# Patient Record
Sex: FEMALE | Race: WHITE | NOT HISPANIC OR LATINO | URBAN - METROPOLITAN AREA
[De-identification: names, ages, dates, MRNs, and addresses within clinical notes are randomized per-mention and may not be internally consistent; named-entity substitution may affect disease eponyms.]

---

## 2017-08-18 PROBLEM — Z00.00 ENCOUNTER FOR PREVENTIVE HEALTH EXAMINATION: Status: ACTIVE | Noted: 2017-08-18

## 2018-09-26 ENCOUNTER — OUTPATIENT (OUTPATIENT)
Dept: OUTPATIENT SERVICES | Facility: HOSPITAL | Age: 63
LOS: 1 days | End: 2018-09-26

## 2018-09-27 ENCOUNTER — EMERGENCY (EMERGENCY)
Facility: HOSPITAL | Age: 63
LOS: 1 days | Discharge: HOME | End: 2018-09-27
Attending: EMERGENCY MEDICINE
Payer: COMMERCIAL

## 2018-09-27 VITALS
OXYGEN SATURATION: 98 % | WEIGHT: 98.11 LBS | DIASTOLIC BLOOD PRESSURE: 100 MMHG | RESPIRATION RATE: 16 BRPM | SYSTOLIC BLOOD PRESSURE: 182 MMHG | TEMPERATURE: 98 F | HEART RATE: 72 BPM | HEIGHT: 59 IN

## 2018-09-27 VITALS
RESPIRATION RATE: 15 BRPM | OXYGEN SATURATION: 98 % | DIASTOLIC BLOOD PRESSURE: 80 MMHG | HEART RATE: 94 BPM | SYSTOLIC BLOOD PRESSURE: 136 MMHG | TEMPERATURE: 98 F

## 2018-09-27 DIAGNOSIS — F06.8 OTHER SPECIFIED MENTAL DISORDERS DUE TO KNOWN PHYSIOLOGICAL CONDITION: ICD-10-CM

## 2018-09-27 DIAGNOSIS — Z98.891 HISTORY OF UTERINE SCAR FROM PREVIOUS SURGERY: Chronic | ICD-10-CM

## 2018-09-27 LAB
ANION GAP SERPL CALC-SCNC: 8 MMOL/L — SIGNIFICANT CHANGE UP (ref 5–17)
APTT BLD: 32.8 SEC — SIGNIFICANT CHANGE UP (ref 27.5–37.4)
BASOPHILS # BLD AUTO: 0.04 K/UL — SIGNIFICANT CHANGE UP (ref 0–0.2)
BASOPHILS NFR BLD AUTO: 0.8 % — SIGNIFICANT CHANGE UP (ref 0–2)
BUN SERPL-MCNC: 19 MG/DL — SIGNIFICANT CHANGE UP (ref 7–23)
CALCIUM SERPL-MCNC: 9.1 MG/DL — SIGNIFICANT CHANGE UP (ref 8.5–10.1)
CHLORIDE SERPL-SCNC: 105 MMOL/L — SIGNIFICANT CHANGE UP (ref 96–108)
CK MB CFR SERPL CALC: 7.3 NG/ML — HIGH (ref 0–3.6)
CO2 SERPL-SCNC: 30 MMOL/L — SIGNIFICANT CHANGE UP (ref 22–31)
CREAT SERPL-MCNC: 0.67 MG/DL — SIGNIFICANT CHANGE UP (ref 0.5–1.3)
EOSINOPHIL # BLD AUTO: 0.15 K/UL — SIGNIFICANT CHANGE UP (ref 0–0.5)
EOSINOPHIL NFR BLD AUTO: 2.9 % — SIGNIFICANT CHANGE UP (ref 0–6)
GLUCOSE SERPL-MCNC: 83 MG/DL — SIGNIFICANT CHANGE UP (ref 70–99)
HCT VFR BLD CALC: 37.9 % — SIGNIFICANT CHANGE UP (ref 34.5–45)
HGB BLD-MCNC: 13.1 G/DL — SIGNIFICANT CHANGE UP (ref 11.5–15.5)
IMM GRANULOCYTES NFR BLD AUTO: 0.2 % — SIGNIFICANT CHANGE UP (ref 0–1.5)
INR BLD: 1.06 RATIO — SIGNIFICANT CHANGE UP (ref 0.88–1.16)
LACTATE SERPL-SCNC: 1.2 MMOL/L — SIGNIFICANT CHANGE UP (ref 0.7–2)
LIDOCAIN IGE QN: 124 U/L — SIGNIFICANT CHANGE UP (ref 73–393)
LYMPHOCYTES # BLD AUTO: 0.59 K/UL — LOW (ref 1–3.3)
LYMPHOCYTES # BLD AUTO: 11.5 % — LOW (ref 13–44)
MCHC RBC-ENTMCNC: 31 PG — SIGNIFICANT CHANGE UP (ref 27–34)
MCHC RBC-ENTMCNC: 34.6 GM/DL — SIGNIFICANT CHANGE UP (ref 32–36)
MCV RBC AUTO: 89.8 FL — SIGNIFICANT CHANGE UP (ref 80–100)
MONOCYTES # BLD AUTO: 0.34 K/UL — SIGNIFICANT CHANGE UP (ref 0–0.9)
MONOCYTES NFR BLD AUTO: 6.7 % — SIGNIFICANT CHANGE UP (ref 2–14)
NEUTROPHILS # BLD AUTO: 3.98 K/UL — SIGNIFICANT CHANGE UP (ref 1.8–7.4)
NEUTROPHILS NFR BLD AUTO: 77.9 % — HIGH (ref 43–77)
PLATELET # BLD AUTO: 376 K/UL — SIGNIFICANT CHANGE UP (ref 150–400)
POTASSIUM SERPL-MCNC: 4.2 MMOL/L — SIGNIFICANT CHANGE UP (ref 3.5–5.3)
POTASSIUM SERPL-SCNC: 4.2 MMOL/L — SIGNIFICANT CHANGE UP (ref 3.5–5.3)
PROTHROM AB SERPL-ACNC: 11.6 SEC — SIGNIFICANT CHANGE UP (ref 9.8–12.7)
RBC # BLD: 4.22 M/UL — SIGNIFICANT CHANGE UP (ref 3.8–5.2)
RBC # FLD: 13.7 % — SIGNIFICANT CHANGE UP (ref 10.3–14.5)
SODIUM SERPL-SCNC: 143 MMOL/L — SIGNIFICANT CHANGE UP (ref 135–145)
TROPONIN I SERPL-MCNC: <.015 NG/ML — SIGNIFICANT CHANGE UP (ref 0.01–0.04)
WBC # BLD: 5.11 K/UL — SIGNIFICANT CHANGE UP (ref 3.8–10.5)
WBC # FLD AUTO: 5.11 K/UL — SIGNIFICANT CHANGE UP (ref 3.8–10.5)

## 2018-09-27 PROCEDURE — 70450 CT HEAD/BRAIN W/O DYE: CPT

## 2018-09-27 PROCEDURE — 82553 CREATINE MB FRACTION: CPT

## 2018-09-27 PROCEDURE — 72125 CT NECK SPINE W/O DYE: CPT

## 2018-09-27 PROCEDURE — 70544 MR ANGIOGRAPHY HEAD W/O DYE: CPT

## 2018-09-27 PROCEDURE — 93970 EXTREMITY STUDY: CPT | Mod: 26

## 2018-09-27 PROCEDURE — 99284 EMERGENCY DEPT VISIT MOD MDM: CPT

## 2018-09-27 PROCEDURE — 71045 X-RAY EXAM CHEST 1 VIEW: CPT

## 2018-09-27 PROCEDURE — 70544 MR ANGIOGRAPHY HEAD W/O DYE: CPT | Mod: 26,59

## 2018-09-27 PROCEDURE — 83605 ASSAY OF LACTIC ACID: CPT

## 2018-09-27 PROCEDURE — 85610 PROTHROMBIN TIME: CPT

## 2018-09-27 PROCEDURE — 70551 MRI BRAIN STEM W/O DYE: CPT | Mod: 26

## 2018-09-27 PROCEDURE — 71045 X-RAY EXAM CHEST 1 VIEW: CPT | Mod: 26

## 2018-09-27 PROCEDURE — 36415 COLL VENOUS BLD VENIPUNCTURE: CPT

## 2018-09-27 PROCEDURE — 93005 ELECTROCARDIOGRAM TRACING: CPT

## 2018-09-27 PROCEDURE — 93970 EXTREMITY STUDY: CPT

## 2018-09-27 PROCEDURE — 84484 ASSAY OF TROPONIN QUANT: CPT

## 2018-09-27 PROCEDURE — 70450 CT HEAD/BRAIN W/O DYE: CPT | Mod: 26

## 2018-09-27 PROCEDURE — 85027 COMPLETE CBC AUTOMATED: CPT

## 2018-09-27 PROCEDURE — 70551 MRI BRAIN STEM W/O DYE: CPT

## 2018-09-27 PROCEDURE — 85730 THROMBOPLASTIN TIME PARTIAL: CPT

## 2018-09-27 PROCEDURE — 99284 EMERGENCY DEPT VISIT MOD MDM: CPT | Mod: 25

## 2018-09-27 PROCEDURE — 80048 BASIC METABOLIC PNL TOTAL CA: CPT

## 2018-09-27 PROCEDURE — 72125 CT NECK SPINE W/O DYE: CPT | Mod: 26

## 2018-09-27 PROCEDURE — 83690 ASSAY OF LIPASE: CPT

## 2018-09-27 RX ORDER — SODIUM CHLORIDE 9 MG/ML
1000 INJECTION INTRAMUSCULAR; INTRAVENOUS; SUBCUTANEOUS ONCE
Qty: 0 | Refills: 0 | Status: COMPLETED | OUTPATIENT
Start: 2018-09-27 | End: 2018-09-27

## 2018-09-27 RX ORDER — DIAZEPAM 5 MG
5 TABLET ORAL ONCE
Qty: 0 | Refills: 0 | Status: DISCONTINUED | OUTPATIENT
Start: 2018-09-27 | End: 2018-09-27

## 2018-09-27 RX ADMIN — SODIUM CHLORIDE 1000 MILLILITER(S): 9 INJECTION INTRAMUSCULAR; INTRAVENOUS; SUBCUTANEOUS at 14:54

## 2018-09-27 RX ADMIN — Medication 5 MILLIGRAM(S): at 14:54

## 2018-09-27 NOTE — ED PROVIDER NOTE - PHYSICAL EXAMINATION
GEN: awake, alert, well appearing, NAD   HENT: atraumatic, normocephalic, LASHONDA, EOMI, no midline instability, oropharynx w/o erythema or exudates, no lymphadenopathy  CV: normal rate and rhythm, S1, S2, no MRG, equal pulses throughout, no JVD, LLE swollen   RESP: no distress, no IWOB, no retraction, clear to auscultation bilaterally   ABD: soft, nontender, nondistended, no rebound, no guarding, normoactive bowel sounds, no organomegally  MUSCULOSKELETAL: strenght 5/5 x 4, full range of motion, CMS intact   SKIN: normal color, no turgor, no wounds or rash   NEURO: Awake alert oriented x 3, no facial asymmetry, no slurred speech, no pronator drift, moving all extremities  PSYCH: no suicial ideation, no homicidal ideation, no depression, no anxiety, no hallucination

## 2018-09-27 NOTE — ED ADULT NURSE REASSESSMENT NOTE - NSIMPLEMENTINTERV_GEN_ALL_ED
Implemented All Fall Risk Interventions:  [place] to call system. Call bell, personal items and telephone within reach. Instruct patient to call for assistance. Room bathroom lighting operational. Non-slip footwear when patient is off stretcher. Physically safe environment: no spills, clutter or unnecessary equipment. Stretcher in lowest position, wheels locked, appropriate side rails in place. Provide visual cue, wrist band, yellow gown, etc. Monitor gait and stability. Monitor for mental status changes and reorient to person, place, and time. Review medications for side effects contributing to fall risk. Reinforce activity limits and safety measures with patient and family.

## 2018-09-27 NOTE — ED ADULT NURSE REASSESSMENT NOTE - NS ED NURSE REASSESS COMMENT FT1
MRI/MRA results in. As per neurology OK for discharge home and follow up outpatient. Patient and family OK with plan.
baclofen pump checked by Alireza from [employer] s/p MRI... Pump is on
neuro @ bedside
Received patient from [place] RN. Patient alert and oriented. Vital signs as documented. Denies pain at this time. Awaiting neuro consult, MRI/A results. Awaiting plan of care. Safety maintained.

## 2018-09-27 NOTE — ED ADULT NURSE NOTE - CHPI ED NUR SYMPTOMS NEG
no dizziness/no change in level of consciousness/no confusion/no loss of consciousness/no fever/no nausea/no blurred vision/no vomiting

## 2018-09-27 NOTE — ED ADULT NURSE NOTE - NSIMPLEMENTINTERV_GEN_ALL_ED
Implemented All Fall Risk Interventions:  [place] to call system. Call bell, personal items and telephone within reach. Instruct patient to call for assistance. Room bathroom lighting operational. Non-slip footwear when patient is off stretcher. Physically safe environment: no spills, clutter or unnecessary equipment. Stretcher in lowest position, wheels locked, appropriate side rails in place. Provide visual cue, wrist band, yellow gown, etc. Monitor gait and stability. Monitor for mental status changes and reorient to person, place, and time. Review medications for side effects contributing to fall risk. Reinforce activity limits and safety measures with patient and family.

## 2018-09-27 NOTE — ED ADULT NURSE NOTE - OBJECTIVE STATEMENT
received pt in bed #14a Pt A&O c/o slurred speech since around 1800 last night Dr Artis @ bedside pt also c/o neck pain No slurred speech noted but pt appears to be stuttering which is new according to pt's . Right hand always numb due to MS received pt in bed #14a Pt A&O c/o slurred speech since around 1800 last night Dr Artis @ bedside pt also c/o neck pain No slurred speech noted but pt appears to be stuttering which is new according to pt's . Right hand always numb due to MS Right leg "always red & swollen Left leg "not usually swollen"

## 2018-09-27 NOTE — ED ADULT NURSE NOTE - NS PRO PASSIVE SMOKE EXP
Spoke to pt - informed of results. Advised to return to ED asap for treatment. Pt states that she will return to the ED tonight.
No

## 2018-09-27 NOTE — ED PROVIDER NOTE - OBJECTIVE STATEMENT
63yo F h/o MS p/w expressive aphasia since last night, also c/o R posterior superior neck/ha w/ bilateral UE numbness and LE weakness. denies f/c/n/v/d/cp/sob/abd pain.

## 2018-09-27 NOTE — ED PROVIDER NOTE - MEDICAL DECISION MAKING DETAILS
61yo F h/o MS p/w expressive aphasia since last night, also c/o R posterior superior neck/ha w/ bilateral UE numbness and LE weakness. denies f/c/n/v/d/cp/sob/abd pain.

## 2018-09-27 NOTE — ED PROVIDER NOTE - NS ED ROS FT
GEN: no fever, no chills, no weakness  HENT: no eye pain, no visual changes, no ear pain, no visual or hearing changes, no sore throat, no swelling +neck pain  CV: no chest pain, no palpitations, no dizziness, no swelling  RESP: no coughing, no sob, no IWOB, no TRUJILLO  GI: no abd pain, no distension, no nausea, no vomiting, no diarrhea, no constipation  : no dysuria,  no frequency, no hematuria, no discharge, no flank pain  MUSCULOSKELETAL: no myalgia, no arthralgia, no joint swelling, no bruising   SKIN: no rash, no wounds, no itching  NEURO: no change in mentation, no visual changes, +HA, no focal weakness, +trouble speaking, no gait abnormalities, no dizziness, +B/L UE numbness   PSYCH: no suidical ideation, no homicidal ideation, no depression, no anxiety, no hallucinations

## 2018-11-05 ENCOUNTER — OUTPATIENT (OUTPATIENT)
Dept: OUTPATIENT SERVICES | Facility: HOSPITAL | Age: 63
LOS: 1 days | End: 2018-11-05

## 2018-11-05 DIAGNOSIS — Z98.891 HISTORY OF UTERINE SCAR FROM PREVIOUS SURGERY: Chronic | ICD-10-CM

## 2018-11-07 DIAGNOSIS — R41.841 COGNITIVE COMMUNICATION DEFICIT: ICD-10-CM

## 2018-11-07 DIAGNOSIS — G35 MULTIPLE SCLEROSIS: ICD-10-CM

## 2018-11-07 DIAGNOSIS — R47.01 APHASIA: ICD-10-CM

## 2018-12-03 DIAGNOSIS — F43.21 ADJUSTMENT DISORDER WITH DEPRESSED MOOD: ICD-10-CM

## 2019-09-06 ENCOUNTER — INPATIENT (INPATIENT)
Facility: HOSPITAL | Age: 64
LOS: 5 days | Discharge: NURSING, REHAB OR LONG-TERM CARE | End: 2019-09-12
Attending: ORTHOPAEDIC SURGERY | Admitting: ORTHOPAEDIC SURGERY
Payer: COMMERCIAL

## 2019-09-06 ENCOUNTER — TRANSCRIPTION ENCOUNTER (OUTPATIENT)
Age: 64
End: 2019-09-06

## 2019-09-06 VITALS
OXYGEN SATURATION: 98 % | HEART RATE: 113 BPM | TEMPERATURE: 99 F | SYSTOLIC BLOOD PRESSURE: 131 MMHG | DIASTOLIC BLOOD PRESSURE: 80 MMHG | RESPIRATION RATE: 16 BRPM

## 2019-09-06 DIAGNOSIS — Z98.891 HISTORY OF UTERINE SCAR FROM PREVIOUS SURGERY: Chronic | ICD-10-CM

## 2019-09-06 DIAGNOSIS — D64.9 ANEMIA, UNSPECIFIED: ICD-10-CM

## 2019-09-06 DIAGNOSIS — Z01.818 ENCOUNTER FOR OTHER PREPROCEDURAL EXAMINATION: ICD-10-CM

## 2019-09-06 DIAGNOSIS — G35 MULTIPLE SCLEROSIS: ICD-10-CM

## 2019-09-06 DIAGNOSIS — R35.0 FREQUENCY OF MICTURITION: ICD-10-CM

## 2019-09-06 DIAGNOSIS — S72.141A DISPLACED INTERTROCHANTERIC FRACTURE OF RIGHT FEMUR, INITIAL ENCOUNTER FOR CLOSED FRACTURE: ICD-10-CM

## 2019-09-06 DIAGNOSIS — R00.0 TACHYCARDIA, UNSPECIFIED: ICD-10-CM

## 2019-09-06 DIAGNOSIS — S72.009A FRACTURE OF UNSPECIFIED PART OF NECK OF UNSPECIFIED FEMUR, INITIAL ENCOUNTER FOR CLOSED FRACTURE: ICD-10-CM

## 2019-09-06 DIAGNOSIS — F32.9 MAJOR DEPRESSIVE DISORDER, SINGLE EPISODE, UNSPECIFIED: ICD-10-CM

## 2019-09-06 LAB
APTT BLD: 28.1 SEC — SIGNIFICANT CHANGE UP (ref 27.5–36.3)
BLD GP AB SCN SERPL QL: NEGATIVE — SIGNIFICANT CHANGE UP
HCT VFR BLD CALC: 27.5 % — LOW (ref 34.5–45)
HGB BLD-MCNC: 9.3 G/DL — LOW (ref 11.5–15.5)
INR BLD: 1.07 — SIGNIFICANT CHANGE UP (ref 0.88–1.17)
MCHC RBC-ENTMCNC: 30.3 PG — SIGNIFICANT CHANGE UP (ref 27–34)
MCHC RBC-ENTMCNC: 33.8 % — SIGNIFICANT CHANGE UP (ref 32–36)
MCV RBC AUTO: 89.6 FL — SIGNIFICANT CHANGE UP (ref 80–100)
NRBC # FLD: 0.02 K/UL — SIGNIFICANT CHANGE UP (ref 0–0)
PLATELET # BLD AUTO: 214 K/UL — SIGNIFICANT CHANGE UP (ref 150–400)
PMV BLD: 9.3 FL — SIGNIFICANT CHANGE UP (ref 7–13)
PROTHROM AB SERPL-ACNC: 12.2 SEC — SIGNIFICANT CHANGE UP (ref 9.8–13.1)
RBC # BLD: 3.07 M/UL — LOW (ref 3.8–5.2)
RBC # FLD: 14.2 % — SIGNIFICANT CHANGE UP (ref 10.3–14.5)
RH IG SCN BLD-IMP: POSITIVE — SIGNIFICANT CHANGE UP
WBC # BLD: 8.02 K/UL — SIGNIFICANT CHANGE UP (ref 3.8–10.5)
WBC # FLD AUTO: 8.02 K/UL — SIGNIFICANT CHANGE UP (ref 3.8–10.5)

## 2019-09-06 PROCEDURE — 73502 X-RAY EXAM HIP UNI 2-3 VIEWS: CPT | Mod: 26,RT

## 2019-09-06 PROCEDURE — 71045 X-RAY EXAM CHEST 1 VIEW: CPT | Mod: 26

## 2019-09-06 PROCEDURE — 99221 1ST HOSP IP/OBS SF/LOW 40: CPT | Mod: AI,57

## 2019-09-06 PROCEDURE — 73552 X-RAY EXAM OF FEMUR 2/>: CPT | Mod: 26,RT

## 2019-09-06 PROCEDURE — 99223 1ST HOSP IP/OBS HIGH 75: CPT

## 2019-09-06 RX ORDER — CITALOPRAM 10 MG/1
40 TABLET, FILM COATED ORAL DAILY
Refills: 0 | Status: DISCONTINUED | OUTPATIENT
Start: 2019-09-06 | End: 2019-09-12

## 2019-09-06 RX ORDER — BUPROPION HYDROCHLORIDE 150 MG/1
300 TABLET, EXTENDED RELEASE ORAL DAILY
Refills: 0 | Status: DISCONTINUED | OUTPATIENT
Start: 2019-09-06 | End: 2019-09-12

## 2019-09-06 RX ORDER — SENNA PLUS 8.6 MG/1
2 TABLET ORAL AT BEDTIME
Refills: 0 | Status: DISCONTINUED | OUTPATIENT
Start: 2019-09-06 | End: 2019-09-12

## 2019-09-06 RX ORDER — OXYCODONE HYDROCHLORIDE 5 MG/1
5 TABLET ORAL EVERY 4 HOURS
Refills: 0 | Status: DISCONTINUED | OUTPATIENT
Start: 2019-09-06 | End: 2019-09-12

## 2019-09-06 RX ORDER — ACETAMINOPHEN 500 MG
975 TABLET ORAL EVERY 8 HOURS
Refills: 0 | Status: DISCONTINUED | OUTPATIENT
Start: 2019-09-06 | End: 2019-09-12

## 2019-09-06 RX ORDER — HEPARIN SODIUM 5000 [USP'U]/ML
5000 INJECTION INTRAVENOUS; SUBCUTANEOUS ONCE
Refills: 0 | Status: COMPLETED | OUTPATIENT
Start: 2019-09-06 | End: 2019-09-06

## 2019-09-06 RX ORDER — MAGNESIUM HYDROXIDE 400 MG/1
30 TABLET, CHEWABLE ORAL DAILY
Refills: 0 | Status: DISCONTINUED | OUTPATIENT
Start: 2019-09-06 | End: 2019-09-12

## 2019-09-06 RX ORDER — OXYCODONE HYDROCHLORIDE 5 MG/1
2.5 TABLET ORAL EVERY 4 HOURS
Refills: 0 | Status: DISCONTINUED | OUTPATIENT
Start: 2019-09-06 | End: 2019-09-12

## 2019-09-06 RX ORDER — DOCUSATE SODIUM 100 MG
100 CAPSULE ORAL THREE TIMES A DAY
Refills: 0 | Status: DISCONTINUED | OUTPATIENT
Start: 2019-09-06 | End: 2019-09-12

## 2019-09-06 RX ORDER — KETOROLAC TROMETHAMINE 30 MG/ML
15 SYRINGE (ML) INJECTION ONCE
Refills: 0 | Status: DISCONTINUED | OUTPATIENT
Start: 2019-09-06 | End: 2019-09-06

## 2019-09-06 RX ORDER — SODIUM CHLORIDE 9 MG/ML
1000 INJECTION INTRAMUSCULAR; INTRAVENOUS; SUBCUTANEOUS
Refills: 0 | Status: DISCONTINUED | OUTPATIENT
Start: 2019-09-06 | End: 2019-09-07

## 2019-09-06 RX ORDER — LANOLIN ALCOHOL/MO/W.PET/CERES
3 CREAM (GRAM) TOPICAL AT BEDTIME
Refills: 0 | Status: DISCONTINUED | OUTPATIENT
Start: 2019-09-06 | End: 2019-09-12

## 2019-09-06 RX ADMIN — SODIUM CHLORIDE 125 MILLILITER(S): 9 INJECTION INTRAMUSCULAR; INTRAVENOUS; SUBCUTANEOUS at 22:50

## 2019-09-06 RX ADMIN — SENNA PLUS 2 TABLET(S): 8.6 TABLET ORAL at 22:49

## 2019-09-06 RX ADMIN — HEPARIN SODIUM 5000 UNIT(S): 5000 INJECTION INTRAVENOUS; SUBCUTANEOUS at 22:49

## 2019-09-06 RX ADMIN — Medication 15 MILLIGRAM(S): at 20:41

## 2019-09-06 RX ADMIN — Medication 975 MILLIGRAM(S): at 22:50

## 2019-09-06 RX ADMIN — Medication 100 MILLIGRAM(S): at 22:49

## 2019-09-06 NOTE — H&P ADULT - ASSESSMENT
63F sp fall with R IT Fx    ·	Multimodal pain control  ·	IS  ·	reg diet, NPOpMN  ·	bed rest  ·	venodynes    Ortho [id number]

## 2019-09-06 NOTE — H&P ADULT - NSHPOUTPATIENTPROVIDERS_GEN_ALL_CORE
Med: Medina  Neuro: Swapnil  Heme: Tani    Medtronic Baclofen Pump Serial: [id number] Model: 8637-40, Brian Constant is: 116cc

## 2019-09-06 NOTE — CONSULT NOTE ADULT - PROBLEM SELECTOR RECOMMENDATION 8
Pt without cardiac history, only risk factor is cigarette smoking although quit over 6-7 years ago. MACE=1. Reports recent TTE that was significant only for mild MVP, which is a benign finding. EKG NSR, no ischemic changes. Pt with low METs secondary to progressive neurological disease  RCRI score of 0 indicating pt is low risk for intermediate risk procedure. No contraindications to proceeding with surgery Pt without cardiac history, no risk factors (quit cigarette smoking  over 6-7 years ago) MACE=1. Reports recent TTE that was significant only for mild MVP, which is a benign finding. EKG NSR, no ischemic changes. Pt with low METs secondary to progressive neurological disease  RCRI score of 0 indicating pt is low risk for intermediate risk procedure. No contraindications to proceeding with surgery

## 2019-09-06 NOTE — CONSULT NOTE ADULT - ASSESSMENT
63y W PMH of [place] RAY presents to the ED with c/o R hip pain s/p unwitnessed mechanical fall two days ago found to have an acute right intertrochanteric fracture admitted for surgical intervention. Medicine consulted for pre-op eval

## 2019-09-06 NOTE — CONSULT NOTE ADULT - PROBLEM SELECTOR RECOMMENDATION 4
-Pt s/p course of abx for UTI, reports persistent urinary frequency  -check UA  -if UA negative, can restart methenamine suppressive therapy

## 2019-09-06 NOTE — ED PROVIDER NOTE - PHYSICAL EXAMINATION
GENERAL: A&Ox3, non-toxic appearing, no acute distress  HEENT: NCAT, EOMI, oral mucosa moist, normal conjunctiva  RESP: CTAB, no respiratory distress, no wheezes/rhonchi/rales, speaking in full sentences  CV: RRR, no murmurs/rubs/gallops  ABDOMEN: soft, non-tender, non-distended, no guarding, baclofen pump in place  MSK: RLE shortened with external rotation, RLE chronic edema, strength limited due to pain  NEURO: no focal sensory or motor deficits, normal CN exam   SKIN: warm, normal color, well perfused, no rash, right hip ecchymosis  PSYCH: normal affect    -Charly Mccall, PGY-1

## 2019-09-06 NOTE — CONSULT NOTE ADULT - PROBLEM SELECTOR RECOMMENDATION 7
Pt without cardiac history, only risk factor is cigarette smoking although pt no longer actively smoking. Reports recent TTE that was significant only for mild MVP, which is a benign finding. EKG NSR, no ischemic changes. Pt with low METs secondary to progressive neurological disease  RCRI score of 0 indicating pt is low risk for intermediate risk procedure. No contraindications to proceeding with surgery -likely related to poor PO intake  -continue to trend CMP

## 2019-09-06 NOTE — CONSULT NOTE ADULT - PROBLEM SELECTOR RECOMMENDATION 9
-Pt presents with right hip pain s/p mechanical fall. Xray consistent with acute intertrochanteric fracture of the right hip  -Plan for surgical repair by ortho  -continue with pain control, dvt ppx. IVF x 24 hours given poor PO intake

## 2019-09-06 NOTE — ED PROVIDER NOTE - CLINICAL SUMMARY MEDICAL DECISION MAKING FREE TEXT BOX
DH: Patient is a 63 year old female PMH MS, anemia, presents s/p fall 2 days ago. Sent in by urgent care for right hip fx. Pre-op labs, ekg, repeat x-rays, ortho cs. Likely admission for surgery. Will reassess. 63 year old female PMH MS, anemia, presents s/p fall 2 days ago. Sent in by urgent care for right hip fx. Pre-op labs, ekg, repeat x-rays, ortho cs. XR shows: Acute right intertrochanteric fracture with superior   migration of the distal femur. Question cortical step-off of the right   superior pubic ramus which may represent another acute fracture.  Patient to be admitted for surgery.

## 2019-09-06 NOTE — ED ADULT NURSE NOTE - NSIMPLEMENTINTERV_GEN_ALL_ED
Implemented All Fall Risk Interventions:  [place] to call system. Call bell, personal items and telephone within reach. Instruct patient to call for assistance. Room bathroom lighting operational. Non-slip footwear when patient is off stretcher. Physically safe environment: no spills, clutter or unnecessary equipment. Stretcher in lowest position, wheels locked, appropriate side rails in place. Provide visual cue, wrist band, yellow gown, etc. Monitor gait and stability. Monitor for mental status changes and reorient to person, place, and time. Review medications for side effects contributing to fall risk. Reinforce activity limits and safety measures with patient and family.

## 2019-09-06 NOTE — ED ADULT NURSE REASSESSMENT NOTE - CONDITION
rec'd pt in room 11 with several family members at bedside.  pt c/o moderate pain to right hi[. right leg is exernally rotated. states right foot , extremity and hip chronically sweollen. able to move toes.  given pain meds as ordered.  pain 8/10 before meds.  now 0/10 except on movement.

## 2019-09-06 NOTE — CONSULT NOTE ADULT - PROBLEM SELECTOR RECOMMENDATION 2
-not on active immunosuppressant therapy  -spasticity controlled w/ baclofen pump. Pt also being worked up for LISSA which anesthesia should be made aware of.  -restart on vitamin D supplementation  -Incentive spirometry, PT eval following surgery

## 2019-09-06 NOTE — ED PROVIDER NOTE - CARE PLAN
Principal Discharge DX:	Hip fracture Principal Discharge DX:	Intertrochanteric fracture of right hip  Goal:	Acute right intertrochanteric fracture

## 2019-09-06 NOTE — ED PROVIDER NOTE - OBJECTIVE STATEMENT
Patient is a 63 year old female PMH MS, anemia, presents to the ED with right hip pain. Patient states 2 days ago she was in the bathroom and had a mechanical fall. Unknown if she hit her head but denies LOC. States immediate pain in right hip, unable to ambulate after. Denies any other trauma or pain. Spent last 2 days in bed. Patient went to [facility] today and x-rays revealed possible intertrochanteric fx. Patient states she has no pain when sitting upright and resting. Denies fever, chills, headache, chest pain, shortness of breath, abdominal pain, nausea, vomiting, diarrhea, constipation, dysuria, or weakness. Patient is a 63 year old female PMH MS, anemia, presents to the ED with right hip pain. Multiple family members present at bedside. Patient states 2 days ago she was in the bathroom and had a mechanical fall. Unknown if she hit her head but denies LOC. States immediate pain in right hip, unable to ambulate after. Denies any other trauma or pain. Spent last 2 days in bed. Patient went to [facility] [place] today and x-rays revealed possible intertrochanteric fx. Patient states she has no pain when sitting upright and resting. Denies fever, chills, headache, chest pain, shortness of breath, abdominal pain, nausea, vomiting, diarrhea, constipation, dysuria, or weakness.

## 2019-09-06 NOTE — H&P ADULT - HISTORY OF PRESENT ILLNESS
63yFemale c/o R hip pain s/p unwitnessed mechanical fall two days ago. Patient denies head hit or LOC. Patient denies numbness or tingling in the RLE. Patient denies any other injuries. Has not been able to get up and ambulate since she fell. Patient denies CP, SOB, or palpations before she fell in the bathroom.     Patient minimally ambulatory and walks with a walker at baseline, only within the house. Usually calls  to assist her in the house, but did not the night when she fell.     Per the family, the patient occasionally has memory issues.

## 2019-09-06 NOTE — H&P ADULT - NSHPPHYSICALEXAM_GEN_ALL_CORE
PE  Gen: Laying in bed, alert and oriented, NAD  Resp: Unlabored breathing  RLE: Skin intact, no ecchymosis,   SILT DP/SP/ [place]/Saph,   +EHL/FHL/TA/Gastroc,   Knee/ankle painless ROM,   hip ROM limited 2/2 pain,   DP+,   soft compartments, no calf ttp,   +log roll.    Secondary:  No TTP over bony landmarks, SILT BL, ROM intact BL, distal pulses palpable.

## 2019-09-06 NOTE — H&P ADULT - NSHPLABSRESULTS_GEN_ALL_CORE
Imaging:  XR demonstrating R IT hip fracture    T(C): 36.8 (09-06-19 @ 19:13)  HR: 114 (09-06-19 @ 21:16)  BP: 145/67 (09-06-19 @ 20:46)  RR: 18 (09-06-19 @ 20:46)  SpO2: 97% (09-06-19 @ 20:46)  Wt(kg): --

## 2019-09-06 NOTE — CONSULT NOTE ADULT - SUBJECTIVE AND OBJECTIVE BOX
Patient is a 63y old  Female who presents with a chief complaint of Right hip Fx (06 Sep 2019 21:14)      HPI:  63y W PMH of MS,  anemia presents to the ED with c/o R hip pain s/p unwitnessed mechanical fall two days ago. Pt reports she was ambulating to the bathroom when she lost balance and fell. Patient denies head hit or LOC. Patient denies numbness or tingling in the RLE. Denies preceding chest pain, shortness of breath, dizziness or lightheadedness. Patient denies any other injuries. Has not been able to get up and ambulate since she fell.     Patient minimally ambulatory and walks with a walker at baseline, only within the house. Usually calls  to assist her in the house, but did not the night when she fell. Pt follows with Dr. Jeffrey rKaft for MS. States she has been off immunosuppressants for months due to worsening anemia. Anemia workup including bone marrow was unremarkable and likely attributed to iron def and chronic disease. Had a recent MS flare up 4 weeks ago and required pulse dose steroids. Immunosuppressants have yet to be restarted as pt developed cellulitis of the scalp x2 and UTI. She is on methenamine for UTI ppx.     No personal or family history of adverse reactions to anesthesia. No history of easy bleed or bruising. Had a recent TTE for w/u of baseline tachycardia that showed mild mitral value prolapse. Denies history of stress testing. Family also reports she is being worked up for LISSA given high suspicion in the setting of snoring and fatigue throughout the day.      Pain Symptoms if applicable:             	                         none	   mild         moderate         severe  	                            0	    1-3	     4-6	         7-10  Pain:  severe pain  Location: diffusely but worse in right hip	      Function: [ ] Independent  [ X] Assistance  [ ] Total care  [ ] Non-ambulatory    Allergies    sulfa drugs (Unknown)  sulfamethoxazole (Swelling)    Intolerances        HOME MEDICATIONS: [X ] Reviewed      MEDICATIONS  (STANDING):  acetaminophen   Tablet .. 975 milliGRAM(s) Oral every 8 hours  buPROPion XL . 300 milliGRAM(s) Oral daily  citalopram 40 milliGRAM(s) Oral daily  docusate sodium 100 milliGRAM(s) Oral three times a day  heparin  Injectable 5000 Unit(s) SubCutaneous once  senna 2 Tablet(s) Oral at bedtime  sodium chloride 0.9%. 1000 milliLiter(s) (125 mL/Hr) IV Continuous <Continuous>    MEDICATIONS  (PRN):  magnesium hydroxide Suspension 30 milliLiter(s) Oral daily PRN Constipation  melatonin 3 milliGRAM(s) Oral at bedtime PRN Insomnia  oxyCODONE    IR 2.5 milliGRAM(s) Oral every 4 hours PRN Moderate Pain (4 - 6)  oxyCODONE    IR 5 milliGRAM(s) Oral every 4 hours PRN Severe Pain (7 - 10)      PAST MEDICAL & SURGICAL HISTORY:  MS (multiple sclerosis)  Depression  Multiple Sclerosis: 20 years  H/O  section  History of : 10/87,   [ ] Reviewed     SOCIAL HISTORY:  Residence: [ ] [facility]  [ ] [facility]  [X ] Community  [ ] Substance abuse: denies  [X ] Tobacco: Denies current use, former smoker  [ ] Alcohol use: denies    FAMILY HISTORY:  MI in father at age 83    REVIEW OF SYSTEMS:    CONSTITUTIONAL: +fatigue No fever, weight loss  EYES: No eye pain, visual disturbances, or discharge  ENMT:  No difficulty hearing, tinnitus, vertigo; No sinus or throat pain  NECK: No pain or stiffness  BREASTS: No pain, masses, or nipple discharge  RESPIRATORY: No cough, wheezing, chills or hemoptysis; No shortness of breath  CARDIOVASCULAR: No chest pain, palpitations, dizziness, or leg swelling  GASTROINTESTINAL: No abdominal or epigastric pain. No nausea, vomiting, or hematemesis; No diarrhea or constipation. No melena or hematochezia.  GENITOURINARY: +urinary frequency No dysuria, hematuria, or incontinence  NEUROLOGICAL: +chronic right leg weakness, foot drop. +memory loss No headaches, numbness, or tremors  SKIN: No itching, burning, rashes, or lesions   LYMPH NODES: No enlarged glands  ENDOCRINE: No heat or cold intolerance; No hair loss  MUSCULOSKELETAL: +diffuse MSK pain   PSYCHIATRIC: +depression No anxiety, mood swings, or difficulty sleeping  HEME/LYMPH: No easy bruising, or bleeding gums  ALLERGY AND IMMUNOLOGIC: +eczema No hives   [X  ] All other ROS negative  [  ] Unable to obtain due to poor mental status    Vital Signs Last 24 Hrs  T(C): 36.8 (06 Sep 2019 19:13), Max: 37.3 (06 Sep 2019 16:56)  T(F): 98.3 (06 Sep 2019 19:13), Max: 99.1 (06 Sep 2019 16:56)  HR: 114 (06 Sep 2019 21:16) (113 - 120)  BP: 145/67 (06 Sep 2019 20:46) (131/80 - 145/67)  BP(mean): --  RR: 18 (06 Sep 2019 20:46) (16 - 18)  SpO2: 97% (06 Sep 2019 20:46) (97% - 100%)    PHYSICAL EXAM:    GENERAL: NAD, well-groomed, well-developed  HEAD:  Atraumatic, Normocephalic  EYES: EOMI, PERRLA, conjunctiva and sclera clear  ENMT: Moist mucous membranes  NECK: Supple, No JVD  RESPIRATORY: Clear to auscultation bilaterally; No rales, rhonchi, wheezing, or rubs  CARDIOVASCULAR: Regular rate and rhythm; No murmurs, rubs, or gallops  GASTROINTESTINAL: Soft, Nontender, Nondistended; Bowel sounds present  GENITOURINARY: Not examined  EXTREMITIES:  2+ Peripheral Pulses, No clubbing, cyanosis, or edema  NERVOUS SYSTEM:  Alert & Oriented X3; Moving all 4 extremities; No gross sensory deficits  HEME/LYMPH: No lymphadenopathy noted  SKIN: No rashes or lesions; Incisions C/D/I    LABS:                CAPILLARY BLOOD GLUCOSE        Microbiology   RADIOLOGY & ADDITIONAL STUDIES:    EKG:   Personally Reviewed:  [X ] YES     Imaging:   Personally Reviewed:  [ ] YES               [ ] Consultant(s) Notes Reviewed  [ ] Care Discussed with Consultants/Other Providers:    Advanced Directives: [ ] DNR  [ ] No feeding tube  [ ] MOLST in chart  [ ] MOLST completed today  [ ] Unknown    [ ] Probable osteoporosis  [ ] Possible osteomalacia  [ ] Increased delirium risk  [ ] Delirium and other risks can be reduced by:          -early ambulation          -minimizing "tethers" - IV, oxygen, catheters, etc          -avoiding hypnotics and sedatives          -maintaining hydration/nutrition          -avoid anticholinergics - diphenhydramine, etc          -pain control          -supportive environment Patient is a 63y old  Female who presents with a chief complaint of Right hip Fx (06 Sep 2019 21:14)      HPI:  63y W PMH of MS,  anemia presents to the ED with c/o R hip pain s/p unwitnessed mechanical fall two days ago. Pt reports she was ambulating to the bathroom when she lost balance and fell. Patient denies head hit or LOC. Patient denies numbness or tingling in the RLE. Denies preceding chest pain, shortness of breath, dizziness or lightheadedness. Patient denies any other injuries. Has not been able to get up and ambulate since she fell.     Patient minimally ambulatory and walks with a walker at baseline, only within the house. Usually calls  to assist her in the house, but did not the night when she fell. Pt follows with Dr. Jeffrey Kraft for MS. States she has been off immunosuppressants for months due to worsening anemia. Anemia workup including bone marrow was unremarkable and likely attributed to iron def and chronic disease. Had a recent MS flare up 4 weeks ago and required pulse dose steroids. Immunosuppressants have yet to be restarted as pt developed cellulitis of the scalp x2 and UTI. She is on methenamine for UTI ppx.     No personal or family history of adverse reactions to anesthesia. No history of easy bleed or bruising. Had a recent TTE for w/u of baseline tachycardia that showed mild mitral value prolapse. Denies history of stress testing. Family also reports she is being worked up for LISSA given high suspicion in the setting of snoring and fatigue throughout the day.      Pain Symptoms if applicable:             	                         none	   mild         moderate         severe  	                            0	    1-3	     4-6	         7-10  Pain:  severe pain  Location: diffusely but worse in right hip	      Function: [ ] Independent  [ X] Assistance  [ ] Total care  [ ] Non-ambulatory    Allergies    sulfa drugs (Unknown)  sulfamethoxazole (Swelling)    Intolerances        HOME MEDICATIONS: [X ] Reviewed      MEDICATIONS  (STANDING):  acetaminophen   Tablet .. 975 milliGRAM(s) Oral every 8 hours  buPROPion XL . 300 milliGRAM(s) Oral daily  citalopram 40 milliGRAM(s) Oral daily  docusate sodium 100 milliGRAM(s) Oral three times a day  heparin  Injectable 5000 Unit(s) SubCutaneous once  senna 2 Tablet(s) Oral at bedtime  sodium chloride 0.9%. 1000 milliLiter(s) (125 mL/Hr) IV Continuous <Continuous>    MEDICATIONS  (PRN):  magnesium hydroxide Suspension 30 milliLiter(s) Oral daily PRN Constipation  melatonin 3 milliGRAM(s) Oral at bedtime PRN Insomnia  oxyCODONE    IR 2.5 milliGRAM(s) Oral every 4 hours PRN Moderate Pain (4 - 6)  oxyCODONE    IR 5 milliGRAM(s) Oral every 4 hours PRN Severe Pain (7 - 10)      PAST MEDICAL & SURGICAL HISTORY:  MS (multiple sclerosis)  Depression  Multiple Sclerosis: 20 years  H/O  section  History of : 10/87,   [ ] Reviewed     SOCIAL HISTORY:  Residence: [ ] [facility]  [ ] [facility]  [X ] Community  [ ] Substance abuse: denies  [X ] Tobacco: Denies current use, former smoker  [ ] Alcohol use: denies    FAMILY HISTORY:  MI in father at age 83    REVIEW OF SYSTEMS:    CONSTITUTIONAL: +fatigue No fever, weight loss  EYES: No eye pain, visual disturbances, or discharge  ENMT:  No difficulty hearing, tinnitus, vertigo; No sinus or throat pain  NECK: No pain or stiffness  BREASTS: No pain, masses, or nipple discharge  RESPIRATORY: No cough, wheezing, chills or hemoptysis; No shortness of breath  CARDIOVASCULAR: No chest pain, palpitations, dizziness, or leg swelling  GASTROINTESTINAL: No abdominal or epigastric pain. No nausea, vomiting, or hematemesis; No diarrhea or constipation. No melena or hematochezia.  GENITOURINARY: +urinary frequency No dysuria, hematuria, or incontinence  NEUROLOGICAL: +chronic right leg weakness, foot drop. +memory loss No headaches, numbness, or tremors  SKIN: No itching, burning, rashes, or lesions   LYMPH NODES: No enlarged glands  ENDOCRINE: No heat or cold intolerance; No hair loss  MUSCULOSKELETAL: +diffuse MSK pain   PSYCHIATRIC: +depression No anxiety, mood swings, or difficulty sleeping  HEME/LYMPH: No easy bruising, or bleeding gums  ALLERGY AND IMMUNOLOGIC: +eczema No hives   [X  ] All other ROS negative  [  ] Unable to obtain due to poor mental status    Vital Signs Last 24 Hrs  T(C): 36.8 (06 Sep 2019 19:13), Max: 37.3 (06 Sep 2019 16:56)  T(F): 98.3 (06 Sep 2019 19:13), Max: 99.1 (06 Sep 2019 16:56)  HR: 114 (06 Sep 2019 21:16) (113 - 120)  BP: 145/67 (06 Sep 2019 20:46) (131/80 - 145/67)  BP(mean): --  RR: 18 (06 Sep 2019 20:46) (16 - 18)  SpO2: 97% (06 Sep 2019 20:46) (97% - 100%)    PHYSICAL EXAM:    GENERAL: NAD, well-groomed, well-developed  HEAD:  Atraumatic, Normocephalic  EYES: EOMI, PERRLA, conjunctiva and sclera clear  ENMT: Moist mucous membranes  NECK: Supple, No JVD  RESPIRATORY: Clear to auscultation bilaterally; No rales, rhonchi, wheezing, or rubs  CARDIOVASCULAR: Tachycardic, No murmurs, rubs, or gallops  GASTROINTESTINAL: Soft, Nontender, Nondistended; Bowel sounds present Baclofen pump palpated under skin  GENITOURINARY: Not examined  EXTREMITIES:  TTP over right hip. Right leg externally rotated. Chronic lymphedema of bilateral LE. 2+ Peripheral Pulses, No clubbing, cyanosis  NERVOUS SYSTEM:  Alert & Oriented X3; ROM and strength of right hip limited 2/2 to pain. Dorsiflexion and plantar flexion intact bilaterally. 5/5 strength of left lower leg, 5/5 strength elsewhere; No gross sensory deficits  HEME/LYMPH: No lymphadenopathy noted  SKIN: No rashes or lesions; Incisions C/D/I    LABS:                CAPILLARY BLOOD GLUCOSE        Microbiology   RADIOLOGY & ADDITIONAL STUDIES:    EKG:   Personally Reviewed:  [X ] YES   Sinus tachycardia, no acute ischemic changes    Imaging:   Personally Reviewed:  [X ] YES               [ ] Consultant(s) Notes Reviewed  [ ] Care Discussed with Consultants/Other Providers: ortho

## 2019-09-06 NOTE — ED PROVIDER NOTE - NS ED ROS FT
GENERAL: no fever, no chills  EYES: no change in vision  HEENT: no trouble swallowing or speaking  CARDIAC: no chest pain, no palpitations   PULMONARY: no cough, no shortness of breath, no wheezing  GI: no abdominal pain, no nausea, no vomiting, no diarrhea, no constipation  : no changes in urination, no dysuria  SKIN: no rashes, easy bruising  NEURO: no headache, no numbness, no weakness  MSK: right hip pain    -Charly Mccall, PGY-1

## 2019-09-06 NOTE — CONSULT NOTE ADULT - PROBLEM SELECTOR RECOMMENDATION 5
-EKG consistent with sinus tachycardia. Pt with baseline tachycardia but slightly higher in the setting of pain and dehydration  -continue with pain control and IVF

## 2019-09-06 NOTE — ED ADULT TRIAGE NOTE - CHIEF COMPLAINT QUOTE
Sent from urgent care for confirmed fx of right hip. States she fell 2 days ago. Unable to ambulate due to pain and numbness. Noted to have lymphemia to right LE. h/o MS and has baclofen pump

## 2019-09-06 NOTE — ED ADULT NURSE NOTE - CAS EDP DISCH DISPOSITION ADMI
Nursing Notes    Patient presents to the office today in follow-up. Patient rates his pain at 6/10 on the numerical pain scale. Reviewed medications with counts as follows:    Rx Date filled Qty Dispensed Pill Count Last Dose Short     Methadone 10mg 02/24/17 240 14 This am  No    norco 10/325mg 02/24/17 120 15 Last night  No                                     Comments:  site is down due to technical issues. POC UDS was performed in office today    Any new labs or imaging since last appointment? NO    Have you been to an emergency room (ER) or urgent care clinic since your last visit? NO            Have you been hospitalized since your last visit? NO     If yes, where, when, and reason for visit? Have you seen or consulted any other health care providers outside of the [address] [place] [address]  since your last visit? NO     If yes, where, when, and reason for visit? HM deferred to pcp. cssu/MedSurg

## 2019-09-07 DIAGNOSIS — Z29.9 ENCOUNTER FOR PROPHYLACTIC MEASURES, UNSPECIFIED: ICD-10-CM

## 2019-09-07 DIAGNOSIS — E80.6 OTHER DISORDERS OF BILIRUBIN METABOLISM: ICD-10-CM

## 2019-09-07 LAB
24R-OH-CALCIDIOL SERPL-MCNC: 22.3 NG/ML — LOW (ref 30–80)
ALBUMIN SERPL ELPH-MCNC: 3.3 G/DL — SIGNIFICANT CHANGE UP (ref 3.3–5)
ALBUMIN SERPL ELPH-MCNC: 3.7 G/DL — SIGNIFICANT CHANGE UP (ref 3.3–5)
ALP SERPL-CCNC: 55 U/L — SIGNIFICANT CHANGE UP (ref 40–120)
ALT FLD-CCNC: 14 U/L — SIGNIFICANT CHANGE UP (ref 4–33)
ANION GAP SERPL CALC-SCNC: 11 MMO/L — SIGNIFICANT CHANGE UP (ref 7–14)
ANION GAP SERPL CALC-SCNC: 11 MMO/L — SIGNIFICANT CHANGE UP (ref 7–14)
ANION GAP SERPL CALC-SCNC: 9 MMO/L — SIGNIFICANT CHANGE UP (ref 7–14)
APPEARANCE UR: CLEAR — SIGNIFICANT CHANGE UP
APTT BLD: 27.1 SEC — LOW (ref 27.5–36.3)
AST SERPL-CCNC: 24 U/L — SIGNIFICANT CHANGE UP (ref 4–32)
BACTERIA # UR AUTO: NEGATIVE — SIGNIFICANT CHANGE UP
BASOPHILS # BLD AUTO: 0.03 K/UL — SIGNIFICANT CHANGE UP (ref 0–0.2)
BASOPHILS NFR BLD AUTO: 0.5 % — SIGNIFICANT CHANGE UP (ref 0–2)
BILIRUB SERPL-MCNC: 1.4 MG/DL — HIGH (ref 0.2–1.2)
BILIRUB UR-MCNC: NEGATIVE — SIGNIFICANT CHANGE UP
BLOOD UR QL VISUAL: NEGATIVE — SIGNIFICANT CHANGE UP
BUN SERPL-MCNC: 18 MG/DL — SIGNIFICANT CHANGE UP (ref 7–23)
BUN SERPL-MCNC: 23 MG/DL — SIGNIFICANT CHANGE UP (ref 7–23)
BUN SERPL-MCNC: 24 MG/DL — HIGH (ref 7–23)
CALCIUM SERPL-MCNC: 8.1 MG/DL — LOW (ref 8.4–10.5)
CALCIUM SERPL-MCNC: 8.5 MG/DL — SIGNIFICANT CHANGE UP (ref 8.4–10.5)
CALCIUM SERPL-MCNC: 8.9 MG/DL — SIGNIFICANT CHANGE UP (ref 8.4–10.5)
CHLORIDE SERPL-SCNC: 103 MMOL/L — SIGNIFICANT CHANGE UP (ref 98–107)
CHLORIDE SERPL-SCNC: 97 MMOL/L — LOW (ref 98–107)
CHLORIDE SERPL-SCNC: 99 MMOL/L — SIGNIFICANT CHANGE UP (ref 98–107)
CO2 SERPL-SCNC: 27 MMOL/L — SIGNIFICANT CHANGE UP (ref 22–31)
CO2 SERPL-SCNC: 28 MMOL/L — SIGNIFICANT CHANGE UP (ref 22–31)
CO2 SERPL-SCNC: 28 MMOL/L — SIGNIFICANT CHANGE UP (ref 22–31)
COLOR SPEC: YELLOW — SIGNIFICANT CHANGE UP
CREAT SERPL-MCNC: 0.52 MG/DL — SIGNIFICANT CHANGE UP (ref 0.5–1.3)
CREAT SERPL-MCNC: 0.64 MG/DL — SIGNIFICANT CHANGE UP (ref 0.5–1.3)
CREAT SERPL-MCNC: 0.65 MG/DL — SIGNIFICANT CHANGE UP (ref 0.5–1.3)
EOSINOPHIL # BLD AUTO: 0.08 K/UL — SIGNIFICANT CHANGE UP (ref 0–0.5)
EOSINOPHIL NFR BLD AUTO: 1.4 % — SIGNIFICANT CHANGE UP (ref 0–6)
GLUCOSE SERPL-MCNC: 100 MG/DL — HIGH (ref 70–99)
GLUCOSE SERPL-MCNC: 101 MG/DL — HIGH (ref 70–99)
GLUCOSE SERPL-MCNC: 113 MG/DL — HIGH (ref 70–99)
GLUCOSE UR-MCNC: NEGATIVE — SIGNIFICANT CHANGE UP
HCT VFR BLD CALC: 24.7 % — LOW (ref 34.5–45)
HCT VFR BLD CALC: 25 % — LOW (ref 34.5–45)
HCV AB S/CO SERPL IA: 0.08 S/CO — SIGNIFICANT CHANGE UP (ref 0–0.99)
HCV AB SERPL-IMP: SIGNIFICANT CHANGE UP
HGB BLD-MCNC: 8.2 G/DL — LOW (ref 11.5–15.5)
HGB BLD-MCNC: 8.4 G/DL — LOW (ref 11.5–15.5)
HYALINE CASTS # UR AUTO: NEGATIVE — SIGNIFICANT CHANGE UP
IMM GRANULOCYTES NFR BLD AUTO: 0.2 % — SIGNIFICANT CHANGE UP (ref 0–1.5)
INR BLD: 1.12 — SIGNIFICANT CHANGE UP (ref 0.88–1.17)
KETONES UR-MCNC: SIGNIFICANT CHANGE UP
LEUKOCYTE ESTERASE UR-ACNC: NEGATIVE — SIGNIFICANT CHANGE UP
LYMPHOCYTES # BLD AUTO: 1.35 K/UL — SIGNIFICANT CHANGE UP (ref 1–3.3)
LYMPHOCYTES # BLD AUTO: 23.9 % — SIGNIFICANT CHANGE UP (ref 13–44)
MCHC RBC-ENTMCNC: 30.1 PG — SIGNIFICANT CHANGE UP (ref 27–34)
MCHC RBC-ENTMCNC: 30.3 PG — SIGNIFICANT CHANGE UP (ref 27–34)
MCHC RBC-ENTMCNC: 33.2 % — SIGNIFICANT CHANGE UP (ref 32–36)
MCHC RBC-ENTMCNC: 33.6 % — SIGNIFICANT CHANGE UP (ref 32–36)
MCV RBC AUTO: 90.3 FL — SIGNIFICANT CHANGE UP (ref 80–100)
MCV RBC AUTO: 90.8 FL — SIGNIFICANT CHANGE UP (ref 80–100)
MONOCYTES # BLD AUTO: 0.51 K/UL — SIGNIFICANT CHANGE UP (ref 0–0.9)
MONOCYTES NFR BLD AUTO: 9 % — SIGNIFICANT CHANGE UP (ref 2–14)
NEUTROPHILS # BLD AUTO: 3.67 K/UL — SIGNIFICANT CHANGE UP (ref 1.8–7.4)
NEUTROPHILS NFR BLD AUTO: 65 % — SIGNIFICANT CHANGE UP (ref 43–77)
NITRITE UR-MCNC: NEGATIVE — SIGNIFICANT CHANGE UP
NRBC # FLD: 0 K/UL — SIGNIFICANT CHANGE UP (ref 0–0)
NRBC # FLD: 0 K/UL — SIGNIFICANT CHANGE UP (ref 0–0)
PH UR: 6 — SIGNIFICANT CHANGE UP (ref 5–8)
PLATELET # BLD AUTO: 171 K/UL — SIGNIFICANT CHANGE UP (ref 150–400)
PLATELET # BLD AUTO: 188 K/UL — SIGNIFICANT CHANGE UP (ref 150–400)
PMV BLD: 8.9 FL — SIGNIFICANT CHANGE UP (ref 7–13)
PMV BLD: 9.4 FL — SIGNIFICANT CHANGE UP (ref 7–13)
POTASSIUM SERPL-MCNC: 3.6 MMOL/L — SIGNIFICANT CHANGE UP (ref 3.5–5.3)
POTASSIUM SERPL-MCNC: 4.1 MMOL/L — SIGNIFICANT CHANGE UP (ref 3.5–5.3)
POTASSIUM SERPL-MCNC: 4.2 MMOL/L — SIGNIFICANT CHANGE UP (ref 3.5–5.3)
POTASSIUM SERPL-SCNC: 3.6 MMOL/L — SIGNIFICANT CHANGE UP (ref 3.5–5.3)
POTASSIUM SERPL-SCNC: 4.1 MMOL/L — SIGNIFICANT CHANGE UP (ref 3.5–5.3)
POTASSIUM SERPL-SCNC: 4.2 MMOL/L — SIGNIFICANT CHANGE UP (ref 3.5–5.3)
PROT SERPL-MCNC: 6.1 G/DL — SIGNIFICANT CHANGE UP (ref 6–8.3)
PROT UR-MCNC: 20 — SIGNIFICANT CHANGE UP
PROTHROM AB SERPL-ACNC: 12.5 SEC — SIGNIFICANT CHANGE UP (ref 9.8–13.1)
RBC # BLD: 2.72 M/UL — LOW (ref 3.8–5.2)
RBC # BLD: 2.77 M/UL — LOW (ref 3.8–5.2)
RBC # FLD: 14.2 % — SIGNIFICANT CHANGE UP (ref 10.3–14.5)
RBC # FLD: 14.4 % — SIGNIFICANT CHANGE UP (ref 10.3–14.5)
RBC CASTS # UR COMP ASSIST: SIGNIFICANT CHANGE UP (ref 0–?)
RH IG SCN BLD-IMP: POSITIVE — SIGNIFICANT CHANGE UP
SODIUM SERPL-SCNC: 136 MMOL/L — SIGNIFICANT CHANGE UP (ref 135–145)
SODIUM SERPL-SCNC: 137 MMOL/L — SIGNIFICANT CHANGE UP (ref 135–145)
SODIUM SERPL-SCNC: 140 MMOL/L — SIGNIFICANT CHANGE UP (ref 135–145)
SP GR SPEC: 1.02 — SIGNIFICANT CHANGE UP (ref 1–1.04)
SQUAMOUS # UR AUTO: SIGNIFICANT CHANGE UP
UROBILINOGEN FLD QL: NORMAL — SIGNIFICANT CHANGE UP
WBC # BLD: 5.65 K/UL — SIGNIFICANT CHANGE UP (ref 3.8–10.5)
WBC # BLD: 9.55 K/UL — SIGNIFICANT CHANGE UP (ref 3.8–10.5)
WBC # FLD AUTO: 5.65 K/UL — SIGNIFICANT CHANGE UP (ref 3.8–10.5)
WBC # FLD AUTO: 9.55 K/UL — SIGNIFICANT CHANGE UP (ref 3.8–10.5)
WBC UR QL: SIGNIFICANT CHANGE UP (ref 0–?)

## 2019-09-07 PROCEDURE — 99232 SBSQ HOSP IP/OBS MODERATE 35: CPT

## 2019-09-07 PROCEDURE — 27245 TREAT THIGH FRACTURE: CPT | Mod: RT

## 2019-09-07 RX ORDER — FENTANYL CITRATE 50 UG/ML
25 INJECTION INTRAVENOUS
Refills: 0 | Status: DISCONTINUED | OUTPATIENT
Start: 2019-09-07 | End: 2019-09-07

## 2019-09-07 RX ORDER — FERROUS SULFATE 325(65) MG
325 TABLET ORAL DAILY
Refills: 0 | Status: DISCONTINUED | OUTPATIENT
Start: 2019-09-07 | End: 2019-09-12

## 2019-09-07 RX ORDER — SODIUM CHLORIDE 9 MG/ML
1000 INJECTION INTRAMUSCULAR; INTRAVENOUS; SUBCUTANEOUS
Refills: 0 | Status: DISCONTINUED | OUTPATIENT
Start: 2019-09-07 | End: 2019-09-08

## 2019-09-07 RX ORDER — ENOXAPARIN SODIUM 100 MG/ML
40 INJECTION SUBCUTANEOUS DAILY
Refills: 0 | Status: DISCONTINUED | OUTPATIENT
Start: 2019-09-08 | End: 2019-09-12

## 2019-09-07 RX ORDER — TRAMADOL HYDROCHLORIDE 50 MG/1
25 TABLET ORAL EVERY 8 HOURS
Refills: 0 | Status: DISCONTINUED | OUTPATIENT
Start: 2019-09-07 | End: 2019-09-12

## 2019-09-07 RX ORDER — ONDANSETRON 8 MG/1
4 TABLET, FILM COATED ORAL ONCE
Refills: 0 | Status: DISCONTINUED | OUTPATIENT
Start: 2019-09-07 | End: 2019-09-07

## 2019-09-07 RX ORDER — CEFAZOLIN SODIUM 1 G
2000 VIAL (EA) INJECTION EVERY 8 HOURS
Refills: 0 | Status: COMPLETED | OUTPATIENT
Start: 2019-09-07 | End: 2019-09-08

## 2019-09-07 RX ORDER — CHOLECALCIFEROL (VITAMIN D3) 125 MCG
1000 CAPSULE ORAL DAILY
Refills: 0 | Status: DISCONTINUED | OUTPATIENT
Start: 2019-09-07 | End: 2019-09-12

## 2019-09-07 RX ADMIN — Medication 100 MILLIGRAM(S): at 21:39

## 2019-09-07 RX ADMIN — Medication 975 MILLIGRAM(S): at 05:33

## 2019-09-07 RX ADMIN — SENNA PLUS 2 TABLET(S): 8.6 TABLET ORAL at 21:39

## 2019-09-07 RX ADMIN — TRAMADOL HYDROCHLORIDE 25 MILLIGRAM(S): 50 TABLET ORAL at 22:35

## 2019-09-07 RX ADMIN — Medication 100 MILLIGRAM(S): at 19:10

## 2019-09-07 RX ADMIN — Medication 975 MILLIGRAM(S): at 06:36

## 2019-09-07 RX ADMIN — SODIUM CHLORIDE 125 MILLILITER(S): 9 INJECTION INTRAMUSCULAR; INTRAVENOUS; SUBCUTANEOUS at 13:50

## 2019-09-07 RX ADMIN — TRAMADOL HYDROCHLORIDE 25 MILLIGRAM(S): 50 TABLET ORAL at 21:39

## 2019-09-07 RX ADMIN — Medication 100 MILLIGRAM(S): at 05:34

## 2019-09-07 RX ADMIN — Medication 975 MILLIGRAM(S): at 21:39

## 2019-09-07 NOTE — PROGRESS NOTE ADULT - SUBJECTIVE AND OBJECTIVE BOX
Patient is a 63y old  Female who presents with a chief complaint of Right hip Fx (07 Sep 2019 08:01)    SUBJECTIVE / OVERNIGHT EVENTS: seen in PACU. Denies any pain at this time. Denies any UTI like symptoms. Feels well postoperatively.     MEDICATIONS  (STANDING):  acetaminophen   Tablet .. 975 milliGRAM(s) Oral every 8 hours  buPROPion XL . 300 milliGRAM(s) Oral daily  ceFAZolin   IVPB 2000 milliGRAM(s) IV Intermittent every 8 hours  cholecalciferol 1000 Unit(s) Oral daily  citalopram 40 milliGRAM(s) Oral daily  docusate sodium 100 milliGRAM(s) Oral three times a day  ferrous    sulfate 325 milliGRAM(s) Oral daily  senna 2 Tablet(s) Oral at bedtime  sodium chloride 0.9%. 1000 milliLiter(s) (125 mL/Hr) IV Continuous <Continuous>    MEDICATIONS  (PRN):  fentaNYL    Injectable 25 MICROGram(s) IV Push every 5 minutes PRN Moderate Pain (4 - 6)  magnesium hydroxide Suspension 30 milliLiter(s) Oral daily PRN Constipation  melatonin 3 milliGRAM(s) Oral at bedtime PRN Insomnia  ondansetron Injectable 4 milliGRAM(s) IV Push once PRN Nausea and/or Vomiting  oxyCODONE    IR 2.5 milliGRAM(s) Oral every 4 hours PRN Moderate Pain (4 - 6)  oxyCODONE    IR 5 milliGRAM(s) Oral every 4 hours PRN Severe Pain (7 - 10)  traMADol 25 milliGRAM(s) Oral every 8 hours PRN Mild Pain (1 - 3)      Vital Signs Last 24 Hrs  T(C): 36.6 (07 Sep 2019 13:10), Max: 37.3 (06 Sep 2019 16:56)  T(F): 97.9 (07 Sep 2019 13:10), Max: 99.1 (06 Sep 2019 16:56)  HR: 96 (07 Sep 2019 14:30) (96 - 124)  BP: 117/65 (07 Sep 2019 14:30) (106/66 - 145/67)  BP(mean): 76 (07 Sep 2019 14:30) (75 - 81)  RR: 16 (07 Sep 2019 14:30) (11 - 20)  SpO2: 100% (07 Sep 2019 14:30) (95% - 100%)    I&O's Summary    06 Sep 2019 07:01  -  07 Sep 2019 07:00  --------------------------------------------------------  IN: 0 mL / OUT: 400 mL / NET: -400 mL        PHYSICAL EXAM:  GENERAL: NAD, well-developed  HEAD:  Atraumatic, Normocephalic  EYES: EOMI, PERRLA, conjunctiva and sclera clear  NECK: Supple, No JVD  CHEST/LUNG: Clear to auscultation bilaterally; No wheeze  HEART: Regular rate and rhythm; No murmurs, rubs, or gallops  ABDOMEN: Soft, Nontender, Nondistended; Bowel sounds present  EXTREMITIES:  right leg in dressing  PSYCH: AAOx3  NEUROLOGY: non-focal  SKIN: No rashes or lesions    LABS:                        8.2    9.55  )-----------( 188      ( 07 Sep 2019 13:30 )             24.7         140  |  103  |  18  ----------------------------<  101<H>  4.1   |  28  |  0.52    Ca    8.1<L>      07 Sep 2019 13:30    TPro  x   /  Alb  3.3  /  TBili  x   /  DBili  x   /  AST  x   /  ALT  x   /  AlkPhos  x       PT/INR - ( 07 Sep 2019 04:37 )   PT: 12.5 SEC;   INR: 1.12          PTT - ( 07 Sep 2019 04:37 )  PTT:27.1 SEC      Urinalysis Basic - ( 07 Sep 2019 03:00 )    Color: YELLOW / Appearance: CLEAR / S.023 / pH: 6.0  Gluc: NEGATIVE / Ketone: SMALL  / Bili: NEGATIVE / Urobili: NORMAL   Blood: NEGATIVE / Protein: 20 / Nitrite: NEGATIVE   Leuk Esterase: NEGATIVE / RBC: 3-5 / WBC 3-5   Sq Epi: OCC / Non Sq Epi: x / Bacteria: NEGATIVE        RADIOLOGY & ADDITIONAL TESTS:    Imaging Personally Reviewed:    Consultant(s) Notes Reviewed:      Care Discussed with Consultants/Other Providers:    Assessment and Plan:

## 2019-09-07 NOTE — PROGRESS NOTE ADULT - SUBJECTIVE AND OBJECTIVE BOX
Ortho Progress Note    S: Patient seen and examined. No acute events overnight. Pain well controlled with current regimen. Denies lightheadedness/dizziness, CP/SOB.       O:  Physical Exam:  Gen: Laying in bed, NAD, alert and oriented.   Resp: Unlabored breathing  Ext: EHL/FHL/TA/Sol intact          + SILT DP/SP/BOWMAN/Sa/Tib          +DP, extremity WWP    Vital Signs Last 24 Hrs  T(C): 36.8 (07 Sep 2019 05:29), Max: 37.3 (06 Sep 2019 16:56)  T(F): 98.2 (07 Sep 2019 05:29), Max: 99.1 (06 Sep 2019 16:56)  HR: 103 (07 Sep 2019 05:29) (103 - 120)  BP: 116/71 (07 Sep 2019 05:29) (110/74 - 145/67)  BP(mean): --  RR: 18 (07 Sep 2019 05:29) (16 - 18)  SpO2: 97% (07 Sep 2019 05:29) (97% - 100%)                          8.4    5.65  )-----------( 171      ( 07 Sep 2019 04:37 )             25.0                         9.3    8.02  )-----------( 214      ( 06 Sep 2019 22:40 )             27.5       09-07    137  |  99  |  23  ----------------------------<  100<H>  3.6   |  27  |  0.65        PT/INR - ( 07 Sep 2019 04:37 )   PT: 12.5 SEC;   INR: 1.12          PTT - ( 07 Sep 2019 04:37 )  PTT:27.1 SEC

## 2019-09-08 LAB
ANION GAP SERPL CALC-SCNC: 9 MMO/L — SIGNIFICANT CHANGE UP (ref 7–14)
BUN SERPL-MCNC: 14 MG/DL — SIGNIFICANT CHANGE UP (ref 7–23)
CALCIUM SERPL-MCNC: 8.2 MG/DL — LOW (ref 8.4–10.5)
CHLORIDE SERPL-SCNC: 105 MMOL/L — SIGNIFICANT CHANGE UP (ref 98–107)
CO2 SERPL-SCNC: 28 MMOL/L — SIGNIFICANT CHANGE UP (ref 22–31)
CREAT SERPL-MCNC: 0.49 MG/DL — LOW (ref 0.5–1.3)
GLUCOSE SERPL-MCNC: 92 MG/DL — SIGNIFICANT CHANGE UP (ref 70–99)
HCT VFR BLD CALC: 21.1 % — LOW (ref 34.5–45)
HGB BLD-MCNC: 7 G/DL — CRITICAL LOW (ref 11.5–15.5)
MCHC RBC-ENTMCNC: 30.4 PG — SIGNIFICANT CHANGE UP (ref 27–34)
MCHC RBC-ENTMCNC: 33.2 % — SIGNIFICANT CHANGE UP (ref 32–36)
MCV RBC AUTO: 91.7 FL — SIGNIFICANT CHANGE UP (ref 80–100)
NRBC # FLD: 0 K/UL — SIGNIFICANT CHANGE UP (ref 0–0)
PLATELET # BLD AUTO: 203 K/UL — SIGNIFICANT CHANGE UP (ref 150–400)
PMV BLD: 9.3 FL — SIGNIFICANT CHANGE UP (ref 7–13)
POTASSIUM SERPL-MCNC: 4 MMOL/L — SIGNIFICANT CHANGE UP (ref 3.5–5.3)
POTASSIUM SERPL-SCNC: 4 MMOL/L — SIGNIFICANT CHANGE UP (ref 3.5–5.3)
RBC # BLD: 2.3 M/UL — LOW (ref 3.8–5.2)
RBC # FLD: 14.4 % — SIGNIFICANT CHANGE UP (ref 10.3–14.5)
SODIUM SERPL-SCNC: 142 MMOL/L — SIGNIFICANT CHANGE UP (ref 135–145)
WBC # BLD: 5.58 K/UL — SIGNIFICANT CHANGE UP (ref 3.8–10.5)
WBC # FLD AUTO: 5.58 K/UL — SIGNIFICANT CHANGE UP (ref 3.8–10.5)

## 2019-09-08 PROCEDURE — 99232 SBSQ HOSP IP/OBS MODERATE 35: CPT

## 2019-09-08 RX ADMIN — Medication 3 MILLIGRAM(S): at 22:01

## 2019-09-08 RX ADMIN — Medication 100 MILLIGRAM(S): at 22:01

## 2019-09-08 RX ADMIN — Medication 100 MILLIGRAM(S): at 13:02

## 2019-09-08 RX ADMIN — Medication 975 MILLIGRAM(S): at 22:01

## 2019-09-08 RX ADMIN — BUPROPION HYDROCHLORIDE 300 MILLIGRAM(S): 150 TABLET, EXTENDED RELEASE ORAL at 13:02

## 2019-09-08 RX ADMIN — OXYCODONE HYDROCHLORIDE 2.5 MILLIGRAM(S): 5 TABLET ORAL at 01:41

## 2019-09-08 RX ADMIN — OXYCODONE HYDROCHLORIDE 2.5 MILLIGRAM(S): 5 TABLET ORAL at 02:40

## 2019-09-08 RX ADMIN — SENNA PLUS 2 TABLET(S): 8.6 TABLET ORAL at 22:01

## 2019-09-08 RX ADMIN — ENOXAPARIN SODIUM 40 MILLIGRAM(S): 100 INJECTION SUBCUTANEOUS at 13:02

## 2019-09-08 RX ADMIN — Medication 100 MILLIGRAM(S): at 05:25

## 2019-09-08 RX ADMIN — CITALOPRAM 40 MILLIGRAM(S): 10 TABLET, FILM COATED ORAL at 13:02

## 2019-09-08 RX ADMIN — Medication 325 MILLIGRAM(S): at 13:02

## 2019-09-08 RX ADMIN — Medication 975 MILLIGRAM(S): at 22:02

## 2019-09-08 RX ADMIN — Medication 100 MILLIGRAM(S): at 02:26

## 2019-09-08 RX ADMIN — Medication 975 MILLIGRAM(S): at 13:02

## 2019-09-08 RX ADMIN — Medication 975 MILLIGRAM(S): at 05:25

## 2019-09-08 RX ADMIN — Medication 1000 UNIT(S): at 13:02

## 2019-09-08 NOTE — PROGRESS NOTE ADULT - SUBJECTIVE AND OBJECTIVE BOX
Patient is a 63y old  Female who presents with a chief complaint of Right hip Fx (07 Sep 2019 08:01)    SUBJECTIVE / OVERNIGHT EVENTS:  Denies any pain when not moving. Has severe pain when she does move. Patient is concern for opioid addiction but she was reassured that it is ok to take for short term. Denies any UTI like symptoms. Feels well postoperatively.     MEDICATIONS  (STANDING):  acetaminophen   Tablet .. 975 milliGRAM(s) Oral every 8 hours  buPROPion XL . 300 milliGRAM(s) Oral daily  ceFAZolin   IVPB 2000 milliGRAM(s) IV Intermittent every 8 hours  cholecalciferol 1000 Unit(s) Oral daily  citalopram 40 milliGRAM(s) Oral daily  docusate sodium 100 milliGRAM(s) Oral three times a day  ferrous    sulfate 325 milliGRAM(s) Oral daily  senna 2 Tablet(s) Oral at bedtime  sodium chloride 0.9%. 1000 milliLiter(s) (125 mL/Hr) IV Continuous <Continuous>    MEDICATIONS  (PRN):  fentaNYL    Injectable 25 MICROGram(s) IV Push every 5 minutes PRN Moderate Pain (4 - 6)  magnesium hydroxide Suspension 30 milliLiter(s) Oral daily PRN Constipation  melatonin 3 milliGRAM(s) Oral at bedtime PRN Insomnia  ondansetron Injectable 4 milliGRAM(s) IV Push once PRN Nausea and/or Vomiting  oxyCODONE    IR 2.5 milliGRAM(s) Oral every 4 hours PRN Moderate Pain (4 - 6)  oxyCODONE    IR 5 milliGRAM(s) Oral every 4 hours PRN Severe Pain (7 - 10)  traMADol 25 milliGRAM(s) Oral every 8 hours PRN Mild Pain (1 - 3)      Vital Signs Last 24 Hrs  T(C): 36.7 (08 Sep 2019 09:43), Max: 37.1 (07 Sep 2019 21:22)  T(F): 98.1 (08 Sep 2019 09:43), Max: 98.8 (07 Sep 2019 21:22)  HR: 102 (08 Sep 2019 09:43) (96 - 124)  BP: 94/65 (08 Sep 2019 09:43) (94/65 - 124/81)  BP(mean): 78 (07 Sep 2019 15:00) (75 - 81)  RR: 17 (08 Sep 2019 09:43) (11 - 20)  SpO2: 95% (08 Sep 2019 09:43) (95% - 100%)      PHYSICAL EXAM:  GENERAL: NAD, well-developed  HEAD:  Atraumatic, Normocephalic  EYES: EOMI, PERRLA, conjunctiva and sclera clear  NECK: Supple, No JVD  CHEST/LUNG: Clear to auscultation bilaterally; No wheeze  HEART: Regular rate and rhythm; No murmurs, rubs, or gallops  ABDOMEN: Soft, Nontender, Nondistended; Bowel sounds present  EXTREMITIES:  right leg in dressing  PSYCH: AAOx3  NEUROLOGY: non-focal  SKIN: No rashes or lesions    LABS:        142  |  105  |  14  ----------------------------<  92  4.0   |  28  |  0.49<L>    Ca    8.2<L>      08 Sep 2019 06:14    TPro  x   /  Alb  3.3  /  TBili  x   /  DBili  x   /  AST  x   /  ALT  x   /  AlkPhos  x       142  |  105  |  14  ----------------------------<  92  4.0   |  28  |  0.49<L>    Ca    8.2<L>      08 Sep 2019 06:14    TPro  x   /  Alb  3.3  /  TBili  x   /  DBili  x   /  AST  x   /  ALT  x   /  AlkPhos  x         Urinalysis Basic - ( 07 Sep 2019 03:00 )    Color: YELLOW / Appearance: CLEAR / S.023 / pH: 6.0  Gluc: NEGATIVE / Ketone: SMALL  / Bili: NEGATIVE / Urobili: NORMAL   Blood: NEGATIVE / Protein: 20 / Nitrite: NEGATIVE   Leuk Esterase: NEGATIVE / RBC: 3-5 / WBC 3-5   Sq Epi: OCC / Non Sq Epi: x / Bacteria: NEGATIVE        RADIOLOGY & ADDITIONAL TESTS:    Imaging Personally Reviewed:    Consultant(s) Notes Reviewed:      Care Discussed with Consultants/Other Providers:    Assessment and Plan:

## 2019-09-08 NOTE — PHYSICAL THERAPY INITIAL EVALUATION ADULT - RANGE OF MOTION EXAMINATION, REHAB EVAL
R hip/: 0-30/bilateral upper extremity ROM was WFL (within functional limits)/deficits as listed below/Left LE ROM was WFL (within functional limits)

## 2019-09-08 NOTE — PROGRESS NOTE ADULT - SUBJECTIVE AND OBJECTIVE BOX
Orthopedic Surgery Progress Note    Patient seen and examined this morning. No acute events overnight. Pain is well controlled. Denies f/c, chest pain, sob, dizziness. SCx1 overnight    O:  Vital Signs Last 24 Hrs  T(C): 37.1 (07 Sep 2019 21:22), Max: 37.1 (07 Sep 2019 21:22)  T(F): 98.8 (07 Sep 2019 21:22), Max: 98.8 (07 Sep 2019 21:22)  HR: 106 (07 Sep 2019 21:22) (96 - 124)  BP: 103/63 (07 Sep 2019 21:22) (103/63 - 124/81)  BP(mean): 78 (07 Sep 2019 15:00) (75 - 81)  RR: 16 (07 Sep 2019 21:22) (11 - 20)  SpO2: 96% (07 Sep 2019 21:22) (95% - 100%)    Gen: NAD  RLE  Dressing C/D/I  EHL/FHL/TA/GS intact  SILT DP/SP/BOWMAN/Sa  WWP distally    Labs:                        8.2    9.55  )-----------( 188      ( 07 Sep 2019 13:30 )             24.7                         8.4    5.65  )-----------( 171      ( 07 Sep 2019 04:37 )             25.0       09-07    140  |  103  |  18  ----------------------------<  101<H>  4.1   |  28  |  0.52        PT/INR - ( 07 Sep 2019 04:37 )   PT: 12.5 SEC;   INR: 1.12          PTT - ( 07 Sep 2019 04:37 )  PTT:27.1 SEC

## 2019-09-08 NOTE — OCCUPATIONAL THERAPY INITIAL EVALUATION ADULT - MD ORDER
Occupational Therapy (OT) to evaluate and treat. Occupational Therapy (OT) to evaluate and treat. Per ELLY Flood, pt is okay to participate in OT evaluation and perform activity as tolerated.

## 2019-09-08 NOTE — OCCUPATIONAL THERAPY INITIAL EVALUATION ADULT - GENERAL OBSERVATIONS, REHAB EVAL
Pt. received semisupine in bed. No acute distress. Patient agreed to evaluation from Occupational Therapist. +Clean dry intact dressing to Right Hip, +Heplock, +Bilateral Venodynes.

## 2019-09-08 NOTE — OCCUPATIONAL THERAPY INITIAL EVALUATION ADULT - LIVES WITH, PROFILE
Pt. reports she lives with her  in a house with 5 steps to enter. Once inside, pt. reports bedroom and bathroom are located on them main level. Per pt., she has a bathtub in her bathroom.

## 2019-09-08 NOTE — OCCUPATIONAL THERAPY INITIAL EVALUATION ADULT - PERTINENT HX OF CURRENT PROBLEM, REHAB EVAL
Pt is a 63 year old female who presented to [facility] on 9/6/19 s/p fall and Right hip pain. Xray of Right Femur on 9/6/19 displayed acute right intertrochanteric fracture with superior migration of the distal femur. Pt is now s/p Intramedullary nailing of right femur on 9/7/19.

## 2019-09-09 ENCOUNTER — TRANSCRIPTION ENCOUNTER (OUTPATIENT)
Age: 64
End: 2019-09-09

## 2019-09-09 LAB
ANION GAP SERPL CALC-SCNC: 9 MMO/L — SIGNIFICANT CHANGE UP (ref 7–14)
BLD GP AB SCN SERPL QL: NEGATIVE — SIGNIFICANT CHANGE UP
BUN SERPL-MCNC: 15 MG/DL — SIGNIFICANT CHANGE UP (ref 7–23)
CALCIUM SERPL-MCNC: 8.7 MG/DL — SIGNIFICANT CHANGE UP (ref 8.4–10.5)
CHLORIDE SERPL-SCNC: 104 MMOL/L — SIGNIFICANT CHANGE UP (ref 98–107)
CO2 SERPL-SCNC: 30 MMOL/L — SIGNIFICANT CHANGE UP (ref 22–31)
CREAT SERPL-MCNC: 0.46 MG/DL — LOW (ref 0.5–1.3)
GLUCOSE SERPL-MCNC: 102 MG/DL — HIGH (ref 70–99)
HCT VFR BLD CALC: 28.8 % — LOW (ref 34.5–45)
HGB BLD-MCNC: 9.2 G/DL — LOW (ref 11.5–15.5)
MCHC RBC-ENTMCNC: 29.1 PG — SIGNIFICANT CHANGE UP (ref 27–34)
MCHC RBC-ENTMCNC: 31.9 % — LOW (ref 32–36)
MCV RBC AUTO: 91.1 FL — SIGNIFICANT CHANGE UP (ref 80–100)
NRBC # FLD: 0 K/UL — SIGNIFICANT CHANGE UP (ref 0–0)
PLATELET # BLD AUTO: 226 K/UL — SIGNIFICANT CHANGE UP (ref 150–400)
PMV BLD: 9.1 FL — SIGNIFICANT CHANGE UP (ref 7–13)
POTASSIUM SERPL-MCNC: 3.9 MMOL/L — SIGNIFICANT CHANGE UP (ref 3.5–5.3)
POTASSIUM SERPL-SCNC: 3.9 MMOL/L — SIGNIFICANT CHANGE UP (ref 3.5–5.3)
RBC # BLD: 3.16 M/UL — LOW (ref 3.8–5.2)
RBC # FLD: 14.6 % — HIGH (ref 10.3–14.5)
RH IG SCN BLD-IMP: POSITIVE — SIGNIFICANT CHANGE UP
SODIUM SERPL-SCNC: 143 MMOL/L — SIGNIFICANT CHANGE UP (ref 135–145)
WBC # BLD: 5.62 K/UL — SIGNIFICANT CHANGE UP (ref 3.8–10.5)
WBC # FLD AUTO: 5.62 K/UL — SIGNIFICANT CHANGE UP (ref 3.8–10.5)

## 2019-09-09 PROCEDURE — 73502 X-RAY EXAM HIP UNI 2-3 VIEWS: CPT | Mod: 26,RT

## 2019-09-09 PROCEDURE — 99233 SBSQ HOSP IP/OBS HIGH 50: CPT

## 2019-09-09 RX ADMIN — BUPROPION HYDROCHLORIDE 300 MILLIGRAM(S): 150 TABLET, EXTENDED RELEASE ORAL at 13:12

## 2019-09-09 RX ADMIN — Medication 975 MILLIGRAM(S): at 21:32

## 2019-09-09 RX ADMIN — SENNA PLUS 2 TABLET(S): 8.6 TABLET ORAL at 21:31

## 2019-09-09 RX ADMIN — Medication 325 MILLIGRAM(S): at 13:11

## 2019-09-09 RX ADMIN — Medication 1000 UNIT(S): at 13:12

## 2019-09-09 RX ADMIN — Medication 975 MILLIGRAM(S): at 13:12

## 2019-09-09 RX ADMIN — Medication 100 MILLIGRAM(S): at 13:13

## 2019-09-09 RX ADMIN — Medication 100 MILLIGRAM(S): at 06:11

## 2019-09-09 RX ADMIN — ENOXAPARIN SODIUM 40 MILLIGRAM(S): 100 INJECTION SUBCUTANEOUS at 13:12

## 2019-09-09 RX ADMIN — Medication 100 MILLIGRAM(S): at 21:31

## 2019-09-09 RX ADMIN — Medication 975 MILLIGRAM(S): at 06:11

## 2019-09-09 RX ADMIN — CITALOPRAM 40 MILLIGRAM(S): 10 TABLET, FILM COATED ORAL at 13:13

## 2019-09-09 NOTE — DISCHARGE NOTE NURSING/CASE MANAGEMENT/SOCIAL WORK - NSDCPECAREGIVERED_GEN_ALL_CORE
Medline and carenotes for surgical procedure IM Nail ORIF hip fracture, Washington catheter care,  as well as DC Medications and side effects literature for patient reference./Yes

## 2019-09-09 NOTE — DISCHARGE NOTE NURSING/CASE MANAGEMENT/SOCIAL WORK - NSDCPNINST_GEN_ALL_CORE
Make a follow up appointment with Dr. Carpenter. Call MD if you develop a fever, or if there is redness, swelling, drainage or pain not relieved by pain medication. No heavy lifting, bending, or straining to move your bowels. Take over the counter stool softeners as needed to prevent constipation which may be caused by pain medication.

## 2019-09-09 NOTE — PROGRESS NOTE ADULT - SUBJECTIVE AND OBJECTIVE BOX
ANESTHESIA POSTOP CHECK    63y Female POSTOP DAY 1 S/P     Vital Signs Last 24 Hrs  T(C): 36.7 (09 Sep 2019 06:07), Max: 37.4 (08 Sep 2019 18:35)  T(F): 98 (09 Sep 2019 06:07), Max: 99.4 (08 Sep 2019 18:35)  HR: 89 (09 Sep 2019 06:07) (89 - 109)  BP: 137/81 (09 Sep 2019 06:07) (94/65 - 137/81)  BP(mean): --  RR: 17 (09 Sep 2019 06:07) (17 - 18)  SpO2: 100% (09 Sep 2019 06:07) (95% - 100%)  I&O's Summary    08 Sep 2019 07:01  -  09 Sep 2019 07:00  --------------------------------------------------------  IN: 0 mL / OUT: 1125 mL / NET: -1125 mL        [X ] NO APPARENT ANESTHESIA COMPLICATIONS      Comments:

## 2019-09-09 NOTE — DISCHARGE NOTE NURSING/CASE MANAGEMENT/SOCIAL WORK - NSDCPNPNATDISSUGG_GEN_ALL_CORE
Electrophysiology Consult Note      Reason for consultation: TACHYCARDIA AND BRADYCARDIA    Chief complaint : irregular heart beat    Referring physician: Ofe Giles      Primary care physician: Jannette Alex No

## 2019-09-09 NOTE — PROGRESS NOTE ADULT - SUBJECTIVE AND OBJECTIVE BOX
Orthopedic Surgery Progress Note    Patient seen and examined this morning. No acute events overnight. Pain is well controlled. Denies f/c, chest pain, sob, dizziness. Refused transfusion yesterday    O:  Vital Signs Last 24 Hrs  T(C): 36.7 (09 Sep 2019 06:07), Max: 37.4 (08 Sep 2019 18:35)  T(F): 98 (09 Sep 2019 06:07), Max: 99.4 (08 Sep 2019 18:35)  HR: 89 (09 Sep 2019 06:07) (89 - 109)  BP: 137/81 (09 Sep 2019 06:07) (94/65 - 137/81)  BP(mean): --  RR: 17 (09 Sep 2019 06:07) (17 - 18)  SpO2: 100% (09 Sep 2019 06:07) (95% - 100%)    Gen: NAD  RLE  Dressing C/D/I  EHL/FHL/TA/GS intact  SILT DP/SP/BOWMAN/Sa  WWP distally    Labs:                        7.0    5.58  )-----------( 203      ( 08 Sep 2019 06:14 )             21.1                         8.2    9.55  )-----------( 188      ( 07 Sep 2019 13:30 )             24.7       09-08    142  |  105  |  14  ----------------------------<  92  4.0   |  28  |  0.49<L> Orthopedic Surgery Progress Note    Patient seen and examined this morning. No acute events overnight. Pain is well controlled. Denies f/c, chest pain, sob, dizziness.   O:  Vital Signs Last 24 Hrs  T(C): 36.7 (09 Sep 2019 06:07), Max: 37.4 (08 Sep 2019 18:35)  T(F): 98 (09 Sep 2019 06:07), Max: 99.4 (08 Sep 2019 18:35)  HR: 89 (09 Sep 2019 06:07) (89 - 109)  BP: 137/81 (09 Sep 2019 06:07) (94/65 - 137/81)  BP(mean): --  RR: 17 (09 Sep 2019 06:07) (17 - 18)  SpO2: 100% (09 Sep 2019 06:07) (95% - 100%)    Gen: NAD  RLE  Dressing C/D/I  EHL/FHL/TA/GS intact  SILT DP/SP/BOWMAN/Sa  WWP distally    Labs:                        7.0    5.58  )-----------( 203      ( 08 Sep 2019 06:14 )             21.1                         8.2    9.55  )-----------( 188      ( 07 Sep 2019 13:30 )             24.7       09-08    142  |  105  |  14  ----------------------------<  92  4.0   |  28  |  0.49<L>

## 2019-09-09 NOTE — DISCHARGE NOTE NURSING/CASE MANAGEMENT/SOCIAL WORK - NSDPDISTO_GEN_ALL_CORE
Home with home care/Pt. is afebrile and offers no complaints. In no acute distress. Right hip dressing: clean, dry and intact. Pt is ambulating with a walker, tolerating diet well, and voiding in adequate amounts. Home with home care/Pt. is afebrile and offers no complaints. In no acute distress. Right hip dressing: clean, dry and intact. Pt is ambulating with a walker, tolerating diet well, Washington catheter in place to SGD drainage as per MD instructions.

## 2019-09-09 NOTE — CHART NOTE - NSCHARTNOTEFT_GEN_A_CORE
Notified by nurse that patient complaining of pelvic discomfort, urinary frequency. Voided 5cc. Bladder scan done showing 600cc.  Patient has undergone straight catheterization x3 since admission.  Spoke with urology regarding recommendation for insertion of Washington catheter.   Urology recommending placement of Washington catheter x 1 week to rehab facility and to FU with urology 1 week after discharge.  Discussed above with patient and  who are in agreement with plan. All questions answered to satisfaction.

## 2019-09-09 NOTE — DISCHARGE NOTE NURSING/CASE MANAGEMENT/SOCIAL WORK - NSDCFUADDAPPT_GEN_ALL_CORE_FT
Follow-up with your surgeon when discharged from Rehab in the office as instructed for post-op check as well as with PMD for continuity of care.

## 2019-09-09 NOTE — PROGRESS NOTE ADULT - SUBJECTIVE AND OBJECTIVE BOX
CC: F/U for Rt femur fx and anemia.    SUBJECTIVE / OVERNIGHT EVENTS:  Patient tolerating PRBC transfusion well.  No new complaints.  No F/C, N/V, CP, SOB, Cough, lightheadedness, dizziness, abdominal pain, diarrhea, dysuria.    MEDICATIONS  (STANDING):  acetaminophen   Tablet .. 975 milliGRAM(s) Oral every 8 hours  buPROPion XL . 300 milliGRAM(s) Oral daily  cholecalciferol 1000 Unit(s) Oral daily  citalopram 40 milliGRAM(s) Oral daily  docusate sodium 100 milliGRAM(s) Oral three times a day  enoxaparin Injectable 40 milliGRAM(s) SubCutaneous daily  ferrous    sulfate 325 milliGRAM(s) Oral daily  senna 2 Tablet(s) Oral at bedtime    MEDICATIONS  (PRN):  bisacodyl Suppository 10 milliGRAM(s) Rectal daily PRN If no bowel movement by POD#2  magnesium hydroxide Suspension 30 milliLiter(s) Oral daily PRN Constipation  melatonin 3 milliGRAM(s) Oral at bedtime PRN Insomnia  oxyCODONE    IR 2.5 milliGRAM(s) Oral every 4 hours PRN Moderate Pain (4 - 6)  oxyCODONE    IR 5 milliGRAM(s) Oral every 4 hours PRN Severe Pain (7 - 10)  traMADol 25 milliGRAM(s) Oral every 8 hours PRN Mild Pain (1 - 3)      Vital Signs Last 24 Hrs  T(C): 36.7 (09 Sep 2019 09:15), Max: 37.4 (08 Sep 2019 18:35)  T(F): 98 (09 Sep 2019 09:15), Max: 99.4 (08 Sep 2019 18:35)  HR: 91 (09 Sep 2019 09:15) (89 - 109)  BP: 113/72 (09 Sep 2019 09:15) (105/66 - 137/81)  BP(mean): --  RR: 18 (09 Sep 2019 09:15) (17 - 18)  SpO2: 98% (09 Sep 2019 09:15) (95% - 100%)  CAPILLARY BLOOD GLUCOSE        I&O's Summary    08 Sep 2019 07:01  -  09 Sep 2019 07:00  --------------------------------------------------------  IN: 0 mL / OUT: 1125 mL / NET: -1125 mL        PHYSICAL EXAM:  GENERAL: NAD  HEAD:  Atraumatic, Normocephalic  EYES: EOMI, PERRLA, conjunctiva and sclera clear  NECK: Supple, No JVD  CHEST/LUNG: Clear to auscultation bilaterally; No wheeze  HEART: Regular rate and rhythm; No murmurs, rubs, or gallops  ABDOMEN: Soft, Nontender, Nondistended; Bowel sounds present  BACK: Non tender, ROM intact.  EXTREMITIES:  2+ Peripheral Pulses, No clubbing, cyanosis. RLE limited ROM due to pain.  PSYCH: Calm  NEUROLOGY: AAOx3, non-focal neurological exam  SKIN: Surgical dressing C/D/I    LABS:                        9.2    5.62  )-----------( 226      ( 09 Sep 2019 05:54 )             28.8     09-09    143  |  104  |  15  ----------------------------<  102<H>  3.9   |  30  |  0.46<L>    Ca    8.7      09 Sep 2019 05:54                RADIOLOGY & ADDITIONAL TESTS:    Imaging Personally Reviewed:    Care Discussed with Consultants/Other Providers:    Care Discussed with Orthopedic PA about: anemia, disposition.

## 2019-09-09 NOTE — DISCHARGE NOTE NURSING/CASE MANAGEMENT/SOCIAL WORK - PATIENT PORTAL LINK FT
You can access the FollowMyHealth Patient Portal offered by [facility] by registering at the following website: [email or web address]. By joining [facility]’s FollowMyHealth portal, you will also be able to view your health information using other applications (apps) compatible with our system.

## 2019-09-09 NOTE — DISCHARGE NOTE NURSING/CASE MANAGEMENT/SOCIAL WORK - NSDCPNDISPN_GEN_ALL_CORE
Education provided on the pain management plan of care/Side effects of pain management treatment Safe use, storage and disposal of opioids when prescribed/Activities of daily living, including home environment that might     exacerbate pain or reduce effectiveness of the pain management plan of care as well as strategies to address these issues/Education provided on the pain management plan of care/Side effects of pain management treatment

## 2019-09-09 NOTE — DIETITIAN INITIAL EVALUATION ADULT. - OTHER INFO
Pt state her appetite has been good and she has been eating well. She states that she does not eat a lot but never did. She does not eat red meat nor chicken but makes up her protein using other foods. Pt's usual weight is 100 lbs and her current weight is 90 lbs. she last weight 100 lbs 1 year ago. Pt had no known food allergies. She had no complaints of GI distress nor of difficulty chewing or swallowing. Pt has 3+ edema in her right leg.

## 2019-09-10 ENCOUNTER — TRANSCRIPTION ENCOUNTER (OUTPATIENT)
Age: 64
End: 2019-09-10

## 2019-09-10 DIAGNOSIS — R33.9 RETENTION OF URINE, UNSPECIFIED: ICD-10-CM

## 2019-09-10 PROCEDURE — 99233 SBSQ HOSP IP/OBS HIGH 50: CPT

## 2019-09-10 RX ORDER — FERROUS SULFATE 325(65) MG
0 TABLET ORAL
Qty: 0 | Refills: 0 | DISCHARGE
Start: 2019-09-10

## 2019-09-10 RX ORDER — MAGNESIUM HYDROXIDE 400 MG/1
30 TABLET, CHEWABLE ORAL
Qty: 0 | Refills: 0 | DISCHARGE
Start: 2019-09-10

## 2019-09-10 RX ORDER — OXYCODONE HYDROCHLORIDE 5 MG/1
1 TABLET ORAL
Qty: 0 | Refills: 0 | DISCHARGE
Start: 2019-09-10

## 2019-09-10 RX ORDER — OXYCODONE HYDROCHLORIDE 5 MG/1
2.5 TABLET ORAL
Qty: 0 | Refills: 0 | DISCHARGE
Start: 2019-09-10

## 2019-09-10 RX ORDER — TRAMADOL HYDROCHLORIDE 50 MG/1
0.5 TABLET ORAL
Qty: 0 | Refills: 0 | DISCHARGE
Start: 2019-09-10

## 2019-09-10 RX ORDER — DOCUSATE SODIUM 100 MG
1 CAPSULE ORAL
Qty: 0 | Refills: 0 | DISCHARGE
Start: 2019-09-10

## 2019-09-10 RX ORDER — CHOLECALCIFEROL (VITAMIN D3) 125 MCG
1000 CAPSULE ORAL
Qty: 0 | Refills: 0 | DISCHARGE
Start: 2019-09-10

## 2019-09-10 RX ORDER — SENNA PLUS 8.6 MG/1
2 TABLET ORAL
Qty: 0 | Refills: 0 | DISCHARGE
Start: 2019-09-10

## 2019-09-10 RX ORDER — LANOLIN ALCOHOL/MO/W.PET/CERES
1 CREAM (GRAM) TOPICAL
Qty: 0 | Refills: 0 | DISCHARGE
Start: 2019-09-10

## 2019-09-10 RX ORDER — ENOXAPARIN SODIUM 100 MG/ML
40 INJECTION SUBCUTANEOUS
Qty: 0 | Refills: 0 | DISCHARGE
Start: 2019-09-10

## 2019-09-10 RX ADMIN — CITALOPRAM 40 MILLIGRAM(S): 10 TABLET, FILM COATED ORAL at 11:38

## 2019-09-10 RX ADMIN — Medication 100 MILLIGRAM(S): at 22:39

## 2019-09-10 RX ADMIN — Medication 100 MILLIGRAM(S): at 05:06

## 2019-09-10 RX ADMIN — Medication 975 MILLIGRAM(S): at 05:06

## 2019-09-10 RX ADMIN — BUPROPION HYDROCHLORIDE 300 MILLIGRAM(S): 150 TABLET, EXTENDED RELEASE ORAL at 11:39

## 2019-09-10 RX ADMIN — Medication 10 MILLIGRAM(S): at 11:38

## 2019-09-10 RX ADMIN — Medication 975 MILLIGRAM(S): at 22:39

## 2019-09-10 RX ADMIN — Medication 100 MILLIGRAM(S): at 14:23

## 2019-09-10 RX ADMIN — Medication 1000 UNIT(S): at 11:38

## 2019-09-10 RX ADMIN — ENOXAPARIN SODIUM 40 MILLIGRAM(S): 100 INJECTION SUBCUTANEOUS at 11:39

## 2019-09-10 RX ADMIN — SENNA PLUS 2 TABLET(S): 8.6 TABLET ORAL at 22:39

## 2019-09-10 RX ADMIN — Medication 975 MILLIGRAM(S): at 14:22

## 2019-09-10 NOTE — DISCHARGE NOTE PROVIDER - NSDCCPTREATMENT_GEN_ALL_CORE_FT
PRINCIPAL PROCEDURE  Procedure: Intramedullary nailing of femur  Findings and Treatment: Pt is s/p right hip IM nail for fracture on 9/7/19 without any intraoperative complications.  Pt was admitted after a fall and was seen and cleared by medical team for the OR. Pt is doing well and stable for discharge.  Post op patient had urinary rentention and a jimenez was placed. Remove jimenez in 1 week  and repeat trial of void. Follow up with a urologist if any further issues. Pt is tolerating physical therapy: WBAT with cane/walker,  gait training.  Have sutures/staples removed POD#14 if present.  Pt is on lovenox for DVT prophylaxis take for 6 weeks unless otherwise directed by surgeon.  follow up with Dr. Carpenter in two weeks.  Follow up with primary care doctor in 1-2 weeks for continuity of care and for any medication changes that may have occurred during inpatient stay.

## 2019-09-10 NOTE — PROGRESS NOTE ADULT - SUBJECTIVE AND OBJECTIVE BOX
Ortho       Patient is seen and examined at bedside.  No significant overnight events. Pain well controlled at moment.      Vital Signs Last 24 Hrs  T(C): 36.6 (10 Sep 2019 05:03), Max: 36.8 (09 Sep 2019 17:05)  T(F): 97.8 (10 Sep 2019 05:03), Max: 98.2 (09 Sep 2019 17:05)  HR: 91 (10 Sep 2019 05:03) (89 - 100)  BP: 138/83 (10 Sep 2019 05:03) (112/75 - 138/83)  BP(mean): --  RR: 17 (10 Sep 2019 05:03) (16 - 18)  SpO2: 100% (10 Sep 2019 05:03) (98% - 100%)    Gen: NAD    RLE: Dressing C/D/I.   Motor grossly intact distal + EHL/FHL/ TA/ GS. Sensation is grossly intact to light touch distal. Compartments are soft. Extremity  warm.      Labs:                        9.2    5.62  )-----------( 226      ( 09 Sep 2019 05:54 )             28.8     09-09    143  |  104  |  15  ----------------------------<  102<H>  3.9   |  30  |  0.46<L>    Ca    8.7      09 Sep 2019 05:54        A/P: Patient is a 63y y/o Female s/p right hip IM nail POD # 3  - Pain control / Analgesia  - Inc Spirometry   - Venodynes  - DVT Prophylaxis- lovenox  - PT / OT  - WBAT / OOB  - Rehab planning, with jimenez x 1 week

## 2019-09-10 NOTE — DISCHARGE NOTE PROVIDER - NSDCCPCAREPLAN_GEN_ALL_CORE_FT
PRINCIPAL DISCHARGE DIAGNOSIS  Diagnosis: Intertrochanteric fracture of right hip  Assessment and Plan of Treatment:

## 2019-09-10 NOTE — PROGRESS NOTE ADULT - SUBJECTIVE AND OBJECTIVE BOX
CC: F/U for urinary retention    SUBJECTIVE / OVERNIGHT EVENTS:  Found to have retained urine despite straight caths.  Jimenez placed.  Patient after the jimenez insertion had large BM.  No F/C, N/V, CP, SOB, Cough, lightheadedness, dizziness, abdominal pain, diarrhea, dysuria.    MEDICATIONS  (STANDING):  acetaminophen   Tablet .. 975 milliGRAM(s) Oral every 8 hours  buPROPion XL . 300 milliGRAM(s) Oral daily  cholecalciferol 1000 Unit(s) Oral daily  citalopram 40 milliGRAM(s) Oral daily  docusate sodium 100 milliGRAM(s) Oral three times a day  enoxaparin Injectable 40 milliGRAM(s) SubCutaneous daily  ferrous    sulfate 325 milliGRAM(s) Oral daily  senna 2 Tablet(s) Oral at bedtime    MEDICATIONS  (PRN):  bisacodyl Suppository 10 milliGRAM(s) Rectal daily PRN If no bowel movement by POD#2  magnesium hydroxide Suspension 30 milliLiter(s) Oral daily PRN Constipation  melatonin 3 milliGRAM(s) Oral at bedtime PRN Insomnia  oxyCODONE    IR 2.5 milliGRAM(s) Oral every 4 hours PRN Moderate Pain (4 - 6)  oxyCODONE    IR 5 milliGRAM(s) Oral every 4 hours PRN Severe Pain (7 - 10)  traMADol 25 milliGRAM(s) Oral every 8 hours PRN Mild Pain (1 - 3)      Vital Signs Last 24 Hrs  T(C): 36.7 (10 Sep 2019 10:26), Max: 36.8 (09 Sep 2019 17:05)  T(F): 98.1 (10 Sep 2019 10:26), Max: 98.2 (09 Sep 2019 17:05)  HR: 93 (10 Sep 2019 10:26) (91 - 100)  BP: 137/80 (10 Sep 2019 10:26) (112/75 - 138/83)  BP(mean): --  RR: 18 (10 Sep 2019 10:26) (16 - 18)  SpO2: 100% (10 Sep 2019 10:26) (98% - 100%)  CAPILLARY BLOOD GLUCOSE        I&O's Summary    09 Sep 2019 07:01  -  10 Sep 2019 07:00  --------------------------------------------------------  IN: 0 mL / OUT: 2185 mL / NET: -2185 mL    10 Sep 2019 07:01  -  10 Sep 2019 11:28  --------------------------------------------------------  IN: 0 mL / OUT: 250 mL / NET: -250 mL        PHYSICAL EXAM:  GENERAL: NAD  HEAD:  Atraumatic, Normocephalic  EYES: EOMI, PERRLA, conjunctiva and sclera clear  NECK: Supple, No JVD  CHEST/LUNG: Clear to auscultation bilaterally; No wheeze  HEART: Regular rate and rhythm; No murmurs, rubs, or gallops  ABDOMEN: Soft, Nontender, Nondistended; Bowel sounds present. Jimenez catheter in place.  BACK: Non tender, ROM intact.  EXTREMITIES:  2+ Peripheral Pulses, No clubbing, cyanosis. RLE limited ROM due to pain.  PSYCH: Calm  NEUROLOGY: AAOx3, non-focal neurological exam  SKIN: Surgical dressing C/D/I. Ecchymoses seen on Rt groin.    LABS:                        9.2    5.62  )-----------( 226      ( 09 Sep 2019 05:54 )             28.8     09-09    143  |  104  |  15  ----------------------------<  102<H>  3.9   |  30  |  0.46<L>    Ca    8.7      09 Sep 2019 05:54                RADIOLOGY & ADDITIONAL TESTS:    Imaging Personally Reviewed:    Care Discussed with Consultants/Other Providers:    Care Discussed with Orthopedic PA about: urinary retention, disposition.

## 2019-09-10 NOTE — DISCHARGE NOTE PROVIDER - CARE PROVIDER_API CALL
Corinne Carpenter)  Orthopaedic Surgery  [address], [address]  [place] [address]  Phone: [phone]  Fax: [phone]  Follow Up Time: 2 weeks

## 2019-09-10 NOTE — DISCHARGE NOTE PROVIDER - HOSPITAL COURSE
Pt is s/p right hip IM nail for fracture on 9/7/19 without any intraoperative complications.  Pt was admitted after a fall and was seen and cleared by medical team for the OR. Pt is doing well and stable for discharge.  Post op patient had urinary rentention and a jimenez was placed. Remove jimenez in 1 week  and repeat trial of void. Follow up with a urologist if any further issues. Pt is tolerating physical therapy: WBAT with cane/walker,  gait training.  Have sutures/staples removed POD#14 if present.  Pt is on lovenox for DVT prophylaxis take for 6 weeks unless otherwise directed by surgeon.  follow up with Dr. Carpenter in two weeks.  Follow up with primary care doctor in 1-2 weeks for continuity of care and for any medication changes that may have occurred during inpatient stay. Pt is s/p right hip IM nail for fracture on 9/7/19 without any intraoperative complications.  Pt was admitted after a fall and was seen and cleared by medical team for the OR. Pt is doing well and stable for discharge.  Post op patient had urinary rentention and a jimenez was placed. Remove jimenez in 1 week  and repeat trial of void. Follow up with a urologist if any further issues. Pt is tolerating physical therapy: WBAT with cane/walker,  gait training.  Have sutures/staples removed POD#14 if present.  Pt is on lovenox for DVT prophylaxis take for 6 weeks unless otherwise directed by surgeon.  follow up with Dr. Carpenter in two weeks.  Follow up with primary care doctor in 1-2 weeks for continuity of care and for any medication changes that may have occurred during inpatient stay.      Patient has Baclofen pump for management of her Multiple sclerosis. She is seen by Akilah Scott at [facility]. Last pump change was 8/27/2019. She has an appointment scheduled for 10/22/2019 for pump refill. Please call [facility] with any questions at [phone].

## 2019-09-11 PROCEDURE — 99232 SBSQ HOSP IP/OBS MODERATE 35: CPT

## 2019-09-11 RX ADMIN — Medication 975 MILLIGRAM(S): at 05:21

## 2019-09-11 RX ADMIN — CITALOPRAM 40 MILLIGRAM(S): 10 TABLET, FILM COATED ORAL at 13:56

## 2019-09-11 RX ADMIN — Medication 1000 UNIT(S): at 13:55

## 2019-09-11 RX ADMIN — BUPROPION HYDROCHLORIDE 300 MILLIGRAM(S): 150 TABLET, EXTENDED RELEASE ORAL at 13:55

## 2019-09-11 RX ADMIN — Medication 100 MILLIGRAM(S): at 05:21

## 2019-09-11 RX ADMIN — ENOXAPARIN SODIUM 40 MILLIGRAM(S): 100 INJECTION SUBCUTANEOUS at 13:56

## 2019-09-11 RX ADMIN — Medication 975 MILLIGRAM(S): at 13:55

## 2019-09-11 RX ADMIN — Medication 100 MILLIGRAM(S): at 22:53

## 2019-09-11 RX ADMIN — SENNA PLUS 2 TABLET(S): 8.6 TABLET ORAL at 22:53

## 2019-09-11 RX ADMIN — Medication 975 MILLIGRAM(S): at 22:53

## 2019-09-11 NOTE — PROGRESS NOTE ADULT - PROBLEM SELECTOR PLAN 7
per ortho
per ortho
-EKG consistent with sinus tachycardia. Pt with baseline tachycardia but slightly higher in the setting of pain and hypovolemia from anemia.  - PRBC transfusion.  HR is improved.
-EKG consistent with sinus tachycardia. Pt with baseline tachycardia but slightly higher in the setting of pain and hypovolemia from anemia.  - PRBC transfusion.  HR is improved.

## 2019-09-11 NOTE — PROGRESS NOTE ADULT - SUBJECTIVE AND OBJECTIVE BOX
ORTHO PROGRESS NOTE     Pt seen and examined at bedside, denies SOB, CP, Dizziness. N/V/D /HA.  No significant overnight events. Pain well controlled. Patient ambulated  with PT     Vital Signs Last 24 Hrs  T(C): 36.4 (11 Sep 2019 05:20), Max: 36.9 (10 Sep 2019 13:42)  T(F): 97.5 (11 Sep 2019 05:20), Max: 98.4 (10 Sep 2019 13:42)  HR: 91 (11 Sep 2019 05:20) (91 - 100)  BP: 134/84 (11 Sep 2019 05:20) (129/74 - 140/89)  BP(mean): --  RR: 17 (11 Sep 2019 05:20) (17 - 18)  SpO2: 97% (11 Sep 2019 05:20) (96% - 100%)    Gen: No apparent distress    right hip :  Dressing C/D I    BLE: motor intact EHL/FHL/TA/GS .  Sensation is grossly intact to light touch in the distal extremities.  Compartments are soft bilaterally.  Extremities are warm .         A/P  s/p IM Nail right hip fx, POD #4    - Pain control/ Analgesia  - DVT ppx Lovenox, foot pumps  - PT/OT - wbat/oob    - Incentive Spirometer  - Rehab planning   - notify Ortho for questions

## 2019-09-11 NOTE — PROGRESS NOTE ADULT - PROBLEM SELECTOR PLAN 6
-EKG consistent with sinus tachycardia. Pt with baseline tachycardia but slightly higher in the setting of pain and dehydration  -continue with pain control and IVF
-has baseline anemia  -continue to monitor
UA negative  no need for Abx treatment.  Continue methenamine as outpatient.
-EKG consistent with sinus tachycardia. Pt with baseline tachycardia but slightly higher in the setting of pain and hypovolemia from anemia.  - PRBC transfusion.  HR is improved.
UA negative  no need for Abx treatment.  Continue methenamine as outpatient.

## 2019-09-11 NOTE — PROGRESS NOTE ADULT - SUBJECTIVE AND OBJECTIVE BOX
CC: F/U for femur fx.    SUBJECTIVE / OVERNIGHT EVENTS:  Offers no new complaints.  States that her pain is getting better.  No F/C, N/V, CP, SOB, Cough, lightheadedness, dizziness, abdominal pain, diarrhea, dysuria.      MEDICATIONS  (STANDING):  acetaminophen   Tablet .. 975 milliGRAM(s) Oral every 8 hours  buPROPion XL . 300 milliGRAM(s) Oral daily  cholecalciferol 1000 Unit(s) Oral daily  citalopram 40 milliGRAM(s) Oral daily  docusate sodium 100 milliGRAM(s) Oral three times a day  enoxaparin Injectable 40 milliGRAM(s) SubCutaneous daily  ferrous    sulfate 325 milliGRAM(s) Oral daily  senna 2 Tablet(s) Oral at bedtime    MEDICATIONS  (PRN):  bisacodyl Suppository 10 milliGRAM(s) Rectal daily PRN If no bowel movement by POD#2  magnesium hydroxide Suspension 30 milliLiter(s) Oral daily PRN Constipation  melatonin 3 milliGRAM(s) Oral at bedtime PRN Insomnia  oxyCODONE    IR 2.5 milliGRAM(s) Oral every 4 hours PRN Moderate Pain (4 - 6)  oxyCODONE    IR 5 milliGRAM(s) Oral every 4 hours PRN Severe Pain (7 - 10)  traMADol 25 milliGRAM(s) Oral every 8 hours PRN Mild Pain (1 - 3)      Vital Signs Last 24 Hrs  T(C): 36.8 (11 Sep 2019 09:28), Max: 36.9 (10 Sep 2019 13:42)  T(F): 98.3 (11 Sep 2019 09:28), Max: 98.4 (10 Sep 2019 13:42)  HR: 98 (11 Sep 2019 09:28) (91 - 100)  BP: 115/78 (11 Sep 2019 09:28) (115/78 - 140/89)  BP(mean): --  RR: 17 (11 Sep 2019 09:28) (17 - 18)  SpO2: 97% (11 Sep 2019 09:28) (96% - 100%)  CAPILLARY BLOOD GLUCOSE        I&O's Summary    10 Sep 2019 07:01  -  11 Sep 2019 07:00  --------------------------------------------------------  IN: 0 mL / OUT: 1675 mL / NET: -1675 mL        PHYSICAL EXAM:  GENERAL: NAD  HEAD:  Atraumatic, Normocephalic  EYES: EOMI, PERRLA, conjunctiva and sclera clear  NECK: Supple, No JVD  CHEST/LUNG: Clear to auscultation bilaterally; No wheeze  HEART: Regular rate and rhythm; No murmurs, rubs, or gallops  ABDOMEN: Soft, Nontender, Nondistended; Bowel sounds present. Washington catheter in place.  BACK: Non tender, ROM intact.  EXTREMITIES:  2+ Peripheral Pulses, No clubbing, cyanosis. RLE limited ROM due to pain.  PSYCH: Calm  NEUROLOGY: AAOx3, non-focal neurological exam  SKIN: Surgical dressing C/D/I. Ecchymoses seen on Rt groin.    LABS:                    RADIOLOGY & ADDITIONAL TESTS:    Imaging Personally Reviewed:    Care Discussed with Consultants/Other Providers:    Care Discussed with Orthopedic PA about: disposition

## 2019-09-12 VITALS
TEMPERATURE: 98 F | OXYGEN SATURATION: 100 % | SYSTOLIC BLOOD PRESSURE: 121 MMHG | RESPIRATION RATE: 17 BRPM | HEART RATE: 84 BPM | DIASTOLIC BLOOD PRESSURE: 83 MMHG

## 2019-09-12 PROCEDURE — 99232 SBSQ HOSP IP/OBS MODERATE 35: CPT

## 2019-09-12 RX ADMIN — Medication 1000 UNIT(S): at 13:01

## 2019-09-12 RX ADMIN — BUPROPION HYDROCHLORIDE 300 MILLIGRAM(S): 150 TABLET, EXTENDED RELEASE ORAL at 13:01

## 2019-09-12 RX ADMIN — CITALOPRAM 40 MILLIGRAM(S): 10 TABLET, FILM COATED ORAL at 13:01

## 2019-09-12 RX ADMIN — Medication 975 MILLIGRAM(S): at 13:01

## 2019-09-12 RX ADMIN — ENOXAPARIN SODIUM 40 MILLIGRAM(S): 100 INJECTION SUBCUTANEOUS at 13:01

## 2019-09-12 RX ADMIN — Medication 975 MILLIGRAM(S): at 05:42

## 2019-09-12 RX ADMIN — Medication 100 MILLIGRAM(S): at 05:42

## 2019-09-12 NOTE — PROGRESS NOTE ADULT - PROBLEM SELECTOR PROBLEM 4
Urinary frequency
MS (multiple sclerosis)
Depression
MS (multiple sclerosis)
MS (multiple sclerosis)
Depression

## 2019-09-12 NOTE — PROGRESS NOTE ADULT - PROBLEM SELECTOR PLAN 2
-Patient not on active immunosuppressant therapy due to anemia  -spasticity controlled w/ baclofen pump.  -outpt follow up with neurologist
- patient received extra unit of PRBC yesterday - likely due to confusion over patient refusing the initial rec for PRBC transfusion on sunday.  Discussed with son and patient by the bedside for 25 minutes.  Acknowledged the error.  No adverse outcome noted  in relation to fluid overload, transfusion reaction, etc.  Answered all the questions.  -continue to monitor H/H
- s/p 2u PRBC transfusion.  - no clinical indication for PRBC transfusion today.  -continue to monitor H/H
- s/p 2u PRBC transfusion.  - no clinical indication for PRBC transfusion today.  -continue to monitor H/H
-has baseline anemia however hgb 7 today which is lower than baseline  -agree with PRBC transfusion as ordered by primary team  -continue to monitor H/H
-has baseline anemia however hgb 7 today which is lower than baseline  -agree with PRBC transfusion as ordered by primary team  -asymptomatic at this time  -continue to monitor

## 2019-09-12 NOTE — PROGRESS NOTE ADULT - ASSESSMENT
63y W PMH of MS, anemia presents to the ED with c/o R hip pain s/p unwitnessed mechanical fall two days ago found to have an acute right intertrochanteric fracture s/p fixation on 9/7 complicated by post-op anemia with tachycardia and urinary retention.
63F sp fall with R IT Fx    ·	Multimodal pain control  ·	IS  ·	NPO  ·	bed rest  ·	venodynes    Ortho [id number]
63y W PMH of MS, anemia presents to the ED with c/o R hip pain s/p unwitnessed mechanical fall two days ago found to have an acute right intertrochanteric fracture s/p fixation on 9/7 complicated by post-op anemia with tachycardia and urinary retention.
63y W PMH of MS, anemia presents to the ED with c/o R hip pain s/p unwitnessed mechanical fall two days ago found to have an acute right intertrochanteric fracture s/p fixation on 9/7 complicated by post-op anemia with tachycardia and urinary retention.
63y W PMH of MS, anemia presents to the ED with c/o R hip pain s/p unwitnessed mechanical fall two days ago found to have an acute right intertrochanteric fracture s/p fixation on 9/7 complicated by post-op anemia with tachycardia.
A/P 63y year old female s/p Intramedullary nailing of femur    FU TTV in AM  FU AM labs  Pain Control  DVT PPX  PT/OOB  WBAT   Dispo Planning
A/P 63y year old female s/p Intramedullary nailing of right femur    FU AM CBC  Pain Control  DVT PPX  PT/OOB  WBAT   Dispo Planning - [facility], refusing and wants home, however
63y W PMH of MS, anemia presents to the ED with c/o R hip pain s/p unwitnessed mechanical fall two days ago found to have an acute right intertrochanteric fracture s/p fixation
63y W PMH of MS, anemia presents to the ED with c/o R hip pain s/p unwitnessed mechanical fall two days ago found to have an acute right intertrochanteric fracture s/p fixation

## 2019-09-12 NOTE — PROGRESS NOTE ADULT - PROBLEM SELECTOR PROBLEM 3
Depression
Urinary retention
MS (multiple sclerosis)
Urinary retention
Urinary retention
MS (multiple sclerosis)

## 2019-09-12 NOTE — PROGRESS NOTE ADULT - PROBLEM SELECTOR PLAN 5
UA negative  no need for antibiotics
-EKG consistent with sinus tachycardia. Pt with baseline tachycardia but slightly higher in the setting of pain and dehydration  -continue with pain control and IVF
continue with Wellbutrin and Celexa.
UA negative  no need for antibiotics
continue with Wellbutrin and Celexa.
continue with Wellbutrin and Celexa.

## 2019-09-12 NOTE — PROGRESS NOTE ADULT - PROBLEM SELECTOR PROBLEM 1
Intertrochanteric fracture of right hip

## 2019-09-12 NOTE — PROGRESS NOTE ADULT - PROVIDER SPECIALTY LIST ADULT
Anesthesia
Hospitalist
Orthopedics
Hospitalist
Hospitalist

## 2019-09-12 NOTE — PROGRESS NOTE ADULT - SUBJECTIVE AND OBJECTIVE BOX
ORTHO PROGRESS NOTE     Pt seen and examined at bedside, denies SOB, CP, Dizziness. N/V/D /HA.  No significant overnight events. Pain well controlled. Patient ambulated  with PT     Vital Signs Last 24 Hrs  T(C): 36.3 (12 Sep 2019 05:40), Max: 36.8 (11 Sep 2019 09:28)  T(F): 97.3 (12 Sep 2019 05:40), Max: 98.3 (11 Sep 2019 09:28)  HR: 86 (12 Sep 2019 05:40) (70 - 106)  BP: 140/80 (12 Sep 2019 05:40) (115/78 - 152/90)  BP(mean): 65 (11 Sep 2019 17:37) (65 - 65)  RR: 16 (12 Sep 2019 05:40) (16 - 17)  SpO2: 100% (12 Sep 2019 05:40) (96% - 100%)    Gen: No apparent distress    right lower extremity :  Dressing C/D I    BLE: motor intact EHL/FHL/TA/GS .  Sensation is grossly intact to light touch in the distal extremities.  Compartments are soft bilaterally.  Extremities are warm .  DP 2+ BLE        A/P  s/p right hip IM Nail, POD #5    - Pain control/ Analgesia  - DVT ppx lovenox , foot pumps  - PT/OT - wbat/oob    - Incentive Spirometer  - notify Ortho for questions

## 2019-09-12 NOTE — PROGRESS NOTE ADULT - ATTENDING COMMENTS
John Blackburn MD  [facility] Hospitalist  Pager# [phone]
John Blackburn MD  [facility] Hospitalist  Pager# [phone]
John Blackburn MD  [facility] Hospitalist  Pager# [phone]
John Blackburn MD  [facility] Hospitalist  Pager# [phone]

## 2019-09-12 NOTE — PROGRESS NOTE ADULT - PROBLEM SELECTOR PLAN 4
UA negative  no need for antibiotics
-Patient not on active immunosuppressant therapy due to anemia  -spasticity controlled w/ baclofen pump.  -outpt follow up with neurologist
-Patient not on active immunosuppressant therapy due to post-op care.  -spasticity controlled w/ baclofen pump.  -outpt follow up with neurologist
-Patient not on active immunosuppressant therapy due to post-op care.  -spasticity controlled w/ baclofen pump.  -outpt follow up with neurologist
continue with Wellbutrin and Celexa.
continue with Wellbutrin and Celexa.

## 2019-09-12 NOTE — PROGRESS NOTE ADULT - PROBLEM SELECTOR PLAN 1
-s/p IMN ORIF on 9/7  -pain control with oxyIR PRN and ultram.  Lovenox for VTE prophylaxis.  -PT/OT - dispo to [facility]  - surgical site management as per ortho.
-s/p fixation by ortho  -pain control  -aggressive bowel regimen  -PT/OT  -Further management per ortho
-s/p fixation by ortho  -pain control  -aggressive bowel regimen  -PT/OT  -Further management per ortho

## 2019-09-12 NOTE — PROGRESS NOTE ADULT - REASON FOR ADMISSION
Right hip Fx

## 2019-09-12 NOTE — PROGRESS NOTE ADULT - SUBJECTIVE AND OBJECTIVE BOX
CC: F/U for Rt femur fx    SUBJECTIVE / OVERNIGHT EVENTS:  Pain control improved.  No new complaints.  No F/C, N/V, CP, SOB, Cough, lightheadedness, dizziness, abdominal pain, diarrhea, dysuria.      MEDICATIONS  (STANDING):  acetaminophen   Tablet .. 975 milliGRAM(s) Oral every 8 hours  buPROPion XL . 300 milliGRAM(s) Oral daily  cholecalciferol 1000 Unit(s) Oral daily  citalopram 40 milliGRAM(s) Oral daily  docusate sodium 100 milliGRAM(s) Oral three times a day  enoxaparin Injectable 40 milliGRAM(s) SubCutaneous daily  ferrous    sulfate 325 milliGRAM(s) Oral daily  senna 2 Tablet(s) Oral at bedtime    MEDICATIONS  (PRN):  bisacodyl Suppository 10 milliGRAM(s) Rectal daily PRN If no bowel movement by POD#2  magnesium hydroxide Suspension 30 milliLiter(s) Oral daily PRN Constipation  melatonin 3 milliGRAM(s) Oral at bedtime PRN Insomnia  oxyCODONE    IR 2.5 milliGRAM(s) Oral every 4 hours PRN Moderate Pain (4 - 6)  oxyCODONE    IR 5 milliGRAM(s) Oral every 4 hours PRN Severe Pain (7 - 10)  traMADol 25 milliGRAM(s) Oral every 8 hours PRN Mild Pain (1 - 3)      Vital Signs Last 24 Hrs  T(C): 36.7 (12 Sep 2019 09:46), Max: 36.7 (11 Sep 2019 21:58)  T(F): 98 (12 Sep 2019 09:46), Max: 98 (11 Sep 2019 21:58)  HR: 84 (12 Sep 2019 09:46) (70 - 106)  BP: 121/83 (12 Sep 2019 09:46) (121/83 - 152/90)  BP(mean): 65 (11 Sep 2019 17:37) (65 - 65)  RR: 17 (12 Sep 2019 09:46) (16 - 17)  SpO2: 100% (12 Sep 2019 09:46) (96% - 100%)  CAPILLARY BLOOD GLUCOSE        I&O's Summary    11 Sep 2019 07:01  -  12 Sep 2019 07:00  --------------------------------------------------------  IN: 0 mL / OUT: 2350 mL / NET: -2350 mL    12 Sep 2019 07:01  -  12 Sep 2019 11:06  --------------------------------------------------------  IN: 0 mL / OUT: 300 mL / NET: -300 mL        PHYSICAL EXAM:  GENERAL: NAD  HEAD:  Atraumatic, Normocephalic  EYES: EOMI, PERRLA, conjunctiva and sclera clear  NECK: Supple, No JVD  CHEST/LUNG: Clear to auscultation bilaterally; No wheeze  HEART: Regular rate and rhythm; No murmurs, rubs, or gallops  ABDOMEN: Soft, Nontender, Nondistended; Bowel sounds present. Washington catheter in place.  BACK: Non tender, ROM intact.  EXTREMITIES:  2+ Peripheral Pulses, No clubbing, cyanosis. RLE limited ROM due to pain.  PSYCH: Calm  NEUROLOGY: AAOx3, non-focal neurological exam  SKIN: Surgical dressing C/D/I. Ecchymoses seen on Rt groin.    LABS:                    RADIOLOGY & ADDITIONAL TESTS:    Imaging Personally Reviewed:    Care Discussed with Consultants/Other Providers:    Care Discussed with Orthopedic PA about:

## 2019-09-12 NOTE — PROGRESS NOTE ADULT - PROBLEM SELECTOR PLAN 3
continue with Wellbutrin and Celexa.
Likely due to low mobility, pain meds/anesthetics, constipation.  - jimenez catheter placed  - re-eval for TOV in one week.  - unlikely to be related to MS flare.  - continue methenamine as outpatient.
-Patient not on active immunosuppressant therapy due to anemia  -spasticity controlled w/ baclofen pump.  -outpt follow up with neurologist
Likely due to low mobility, pain meds/anesthetics, constipation.  - jimenez catheter placed  - re-eval for TOV in one week.  - unlikely to be related to MS flare.  - continue methenamine as outpatient.
Likely due to low mobility, pain meds/anesthetics, constipation.  - jimenez catheter placed  - re-eval for TOV in one week.  - unlikely to be related to MS flare.  - continue methenamine as outpatient.
-Patient not on active immunosuppressant therapy due to anemia  -spasticity controlled w/ baclofen pump.  -outpt follow up with neurologist

## 2019-09-16 ENCOUNTER — OUTPATIENT (OUTPATIENT)
Dept: OUTPATIENT SERVICES | Facility: HOSPITAL | Age: 64
LOS: 1 days | Discharge: HOME | End: 2019-09-16
Payer: COMMERCIAL

## 2019-09-16 DIAGNOSIS — Z98.891 HISTORY OF UTERINE SCAR FROM PREVIOUS SURGERY: Chronic | ICD-10-CM

## 2019-09-16 DIAGNOSIS — I82.401 ACUTE EMBOLISM AND THROMBOSIS OF UNSPECIFIED DEEP VEINS OF RIGHT LOWER EXTREMITY: ICD-10-CM

## 2019-09-16 PROCEDURE — 93971 EXTREMITY STUDY: CPT | Mod: 26,RT

## 2019-09-24 ENCOUNTER — TRANSCRIPTION ENCOUNTER (OUTPATIENT)
Age: 64
End: 2019-09-24

## 2019-09-24 ENCOUNTER — INPATIENT (INPATIENT)
Facility: HOSPITAL | Age: 64
LOS: 7 days | Discharge: HOME | DRG: 857 | End: 2019-10-02
Attending: ORTHOPAEDIC SURGERY | Admitting: ORTHOPAEDIC SURGERY
Payer: COMMERCIAL

## 2019-09-24 ENCOUNTER — EMERGENCY (EMERGENCY)
Facility: HOSPITAL | Age: 64
LOS: 1 days | Discharge: ANOTHER HOSPITAL | End: 2019-09-24
Attending: STUDENT IN AN ORGANIZED HEALTH CARE EDUCATION/TRAINING PROGRAM
Payer: COMMERCIAL

## 2019-09-24 VITALS
DIASTOLIC BLOOD PRESSURE: 74 MMHG | RESPIRATION RATE: 18 BRPM | HEART RATE: 107 BPM | WEIGHT: 100.09 LBS | TEMPERATURE: 99 F | OXYGEN SATURATION: 96 % | SYSTOLIC BLOOD PRESSURE: 111 MMHG

## 2019-09-24 VITALS
SYSTOLIC BLOOD PRESSURE: 110 MMHG | DIASTOLIC BLOOD PRESSURE: 62 MMHG | OXYGEN SATURATION: 99 % | HEART RATE: 102 BPM | TEMPERATURE: 100 F | RESPIRATION RATE: 16 BRPM

## 2019-09-24 VITALS — TEMPERATURE: 100 F

## 2019-09-24 DIAGNOSIS — Z98.891 HISTORY OF UTERINE SCAR FROM PREVIOUS SURGERY: Chronic | ICD-10-CM

## 2019-09-24 DIAGNOSIS — T81.49XA INFECTION FOLLOWING A PROCEDURE, OTHER SURGICAL SITE, INITIAL ENCOUNTER: ICD-10-CM

## 2019-09-24 LAB
ALBUMIN SERPL ELPH-MCNC: 3.8 G/DL — SIGNIFICANT CHANGE UP (ref 3.3–5)
ALBUMIN SERPL ELPH-MCNC: 3.8 G/DL — SIGNIFICANT CHANGE UP (ref 3.3–5)
ALP SERPL-CCNC: 147 U/L — HIGH (ref 40–120)
ALP SERPL-CCNC: 154 U/L — HIGH (ref 40–120)
ALT FLD-CCNC: 11 U/L — SIGNIFICANT CHANGE UP (ref 4–33)
ALT FLD-CCNC: 9 U/L — LOW (ref 10–45)
ANION GAP SERPL CALC-SCNC: 12 MMOL/L — SIGNIFICANT CHANGE UP (ref 5–17)
ANION GAP SERPL CALC-SCNC: 18 MMO/L — HIGH (ref 7–14)
APPEARANCE UR: ABNORMAL
APTT BLD: 32.2 SEC — SIGNIFICANT CHANGE UP (ref 27.5–36.3)
AST SERPL-CCNC: 12 U/L — SIGNIFICANT CHANGE UP (ref 10–40)
AST SERPL-CCNC: 33 U/L — HIGH (ref 4–32)
BACTERIA # UR AUTO: ABNORMAL
BASOPHILS # BLD AUTO: 0 K/UL — SIGNIFICANT CHANGE UP (ref 0–0.2)
BASOPHILS # BLD AUTO: 0.07 K/UL — SIGNIFICANT CHANGE UP (ref 0–0.2)
BASOPHILS NFR BLD AUTO: 0.4 % — SIGNIFICANT CHANGE UP (ref 0–2)
BASOPHILS NFR BLD AUTO: 0.7 % — SIGNIFICANT CHANGE UP (ref 0–2)
BILIRUB SERPL-MCNC: 1.2 MG/DL — SIGNIFICANT CHANGE UP (ref 0.2–1.2)
BILIRUB SERPL-MCNC: 1.2 MG/DL — SIGNIFICANT CHANGE UP (ref 0.2–1.2)
BILIRUB UR-MCNC: NEGATIVE — SIGNIFICANT CHANGE UP
BLD GP AB SCN SERPL QL: NEGATIVE — SIGNIFICANT CHANGE UP
BLD GP AB SCN SERPL QL: NEGATIVE — SIGNIFICANT CHANGE UP
BUN SERPL-MCNC: 12 MG/DL — SIGNIFICANT CHANGE UP (ref 7–23)
BUN SERPL-MCNC: 13 MG/DL — SIGNIFICANT CHANGE UP (ref 7–23)
CALCIUM SERPL-MCNC: 8.9 MG/DL — SIGNIFICANT CHANGE UP (ref 8.4–10.5)
CALCIUM SERPL-MCNC: 9 MG/DL — SIGNIFICANT CHANGE UP (ref 8.4–10.5)
CHLORIDE SERPL-SCNC: 94 MMOL/L — LOW (ref 98–107)
CHLORIDE SERPL-SCNC: 98 MMOL/L — SIGNIFICANT CHANGE UP (ref 96–108)
CO2 SERPL-SCNC: 24 MMOL/L — SIGNIFICANT CHANGE UP (ref 22–31)
CO2 SERPL-SCNC: 27 MMOL/L — SIGNIFICANT CHANGE UP (ref 22–31)
COLOR SPEC: SIGNIFICANT CHANGE UP
CREAT SERPL-MCNC: 0.45 MG/DL — LOW (ref 0.5–1.3)
CREAT SERPL-MCNC: 0.47 MG/DL — LOW (ref 0.5–1.3)
CRP SERPL-MCNC: 51.7 MG/L — HIGH
DIFF PNL FLD: ABNORMAL
EOSINOPHIL # BLD AUTO: 0 K/UL — SIGNIFICANT CHANGE UP (ref 0–0.5)
EOSINOPHIL # BLD AUTO: 0.05 K/UL — SIGNIFICANT CHANGE UP (ref 0–0.5)
EOSINOPHIL NFR BLD AUTO: 0.4 % — SIGNIFICANT CHANGE UP (ref 0–6)
EOSINOPHIL NFR BLD AUTO: 0.5 % — SIGNIFICANT CHANGE UP (ref 0–6)
EPI CELLS # UR: 5 — SIGNIFICANT CHANGE UP
ERYTHROCYTE [SEDIMENTATION RATE] IN BLOOD: 30 MM/HR — HIGH (ref 4–25)
GLUCOSE SERPL-MCNC: 101 MG/DL — HIGH (ref 70–99)
GLUCOSE SERPL-MCNC: 71 MG/DL — SIGNIFICANT CHANGE UP (ref 70–99)
GLUCOSE UR QL: NEGATIVE — SIGNIFICANT CHANGE UP
HCG UR QL: NEGATIVE — SIGNIFICANT CHANGE UP
HCT VFR BLD CALC: 32.2 % — LOW (ref 34.5–45)
HCT VFR BLD CALC: 32.4 % — LOW (ref 34.5–45)
HGB BLD-MCNC: 10.7 G/DL — LOW (ref 11.5–15.5)
HGB BLD-MCNC: 10.9 G/DL — LOW (ref 11.5–15.5)
HYALINE CASTS # UR AUTO: 0 /LPF — SIGNIFICANT CHANGE UP (ref 0–7)
IMM GRANULOCYTES NFR BLD AUTO: 0.3 % — SIGNIFICANT CHANGE UP (ref 0–1.5)
INR BLD: 1.06 — SIGNIFICANT CHANGE UP (ref 0.88–1.17)
KETONES UR-MCNC: NEGATIVE — SIGNIFICANT CHANGE UP
LEUKOCYTE ESTERASE UR-ACNC: ABNORMAL
LYMPHOCYTES # BLD AUTO: 0.81 K/UL — LOW (ref 1–3.3)
LYMPHOCYTES # BLD AUTO: 1 K/UL — SIGNIFICANT CHANGE UP (ref 1–3.3)
LYMPHOCYTES # BLD AUTO: 10.3 % — LOW (ref 13–44)
LYMPHOCYTES # BLD AUTO: 8.5 % — LOW (ref 13–44)
MCHC RBC-ENTMCNC: 30.6 PG — SIGNIFICANT CHANGE UP (ref 27–34)
MCHC RBC-ENTMCNC: 30.8 PG — SIGNIFICANT CHANGE UP (ref 27–34)
MCHC RBC-ENTMCNC: 33.2 % — SIGNIFICANT CHANGE UP (ref 32–36)
MCHC RBC-ENTMCNC: 33.5 GM/DL — SIGNIFICANT CHANGE UP (ref 32–36)
MCV RBC AUTO: 91.9 FL — SIGNIFICANT CHANGE UP (ref 80–100)
MCV RBC AUTO: 92 FL — SIGNIFICANT CHANGE UP (ref 80–100)
MONOCYTES # BLD AUTO: 0.6 K/UL — SIGNIFICANT CHANGE UP (ref 0–0.9)
MONOCYTES # BLD AUTO: 0.7 K/UL — SIGNIFICANT CHANGE UP (ref 0–0.9)
MONOCYTES NFR BLD AUTO: 6.1 % — SIGNIFICANT CHANGE UP (ref 2–14)
MONOCYTES NFR BLD AUTO: 7.3 % — SIGNIFICANT CHANGE UP (ref 2–14)
NEUTROPHILS # BLD AUTO: 7.91 K/UL — HIGH (ref 1.8–7.4)
NEUTROPHILS # BLD AUTO: 8.3 K/UL — HIGH (ref 1.8–7.4)
NEUTROPHILS NFR BLD AUTO: 82.7 % — HIGH (ref 43–77)
NEUTROPHILS NFR BLD AUTO: 82.7 % — HIGH (ref 43–77)
NITRITE UR-MCNC: POSITIVE
NRBC # FLD: 0 K/UL — SIGNIFICANT CHANGE UP (ref 0–0)
PH UR: 6.5 — SIGNIFICANT CHANGE UP (ref 5–8)
PLATELET # BLD AUTO: 452 K/UL — HIGH (ref 150–400)
PLATELET # BLD AUTO: 491 K/UL — HIGH (ref 150–400)
PMV BLD: 8.5 FL — SIGNIFICANT CHANGE UP (ref 7–13)
POTASSIUM SERPL-MCNC: 3.8 MMOL/L — SIGNIFICANT CHANGE UP (ref 3.5–5.3)
POTASSIUM SERPL-MCNC: 5 MMOL/L — SIGNIFICANT CHANGE UP (ref 3.5–5.3)
POTASSIUM SERPL-SCNC: 3.8 MMOL/L — SIGNIFICANT CHANGE UP (ref 3.5–5.3)
POTASSIUM SERPL-SCNC: 5 MMOL/L — SIGNIFICANT CHANGE UP (ref 3.5–5.3)
PROT SERPL-MCNC: 6.3 G/DL — SIGNIFICANT CHANGE UP (ref 6–8.3)
PROT SERPL-MCNC: 6.6 G/DL — SIGNIFICANT CHANGE UP (ref 6–8.3)
PROT UR-MCNC: SIGNIFICANT CHANGE UP
PROTHROM AB SERPL-ACNC: 12.1 SEC — SIGNIFICANT CHANGE UP (ref 9.8–13.1)
RBC # BLD: 3.5 M/UL — LOW (ref 3.8–5.2)
RBC # BLD: 3.53 M/UL — LOW (ref 3.8–5.2)
RBC # FLD: 15.3 % — HIGH (ref 10.3–14.5)
RBC # FLD: 16.6 % — HIGH (ref 10.3–14.5)
RBC CASTS # UR COMP ASSIST: 10 /HPF — HIGH (ref 0–4)
RH IG SCN BLD-IMP: POSITIVE — SIGNIFICANT CHANGE UP
RH IG SCN BLD-IMP: POSITIVE — SIGNIFICANT CHANGE UP
SODIUM SERPL-SCNC: 136 MMOL/L — SIGNIFICANT CHANGE UP (ref 135–145)
SODIUM SERPL-SCNC: 137 MMOL/L — SIGNIFICANT CHANGE UP (ref 135–145)
SP GR SPEC: 1.03 — HIGH (ref 1.01–1.02)
UROBILINOGEN FLD QL: NEGATIVE — SIGNIFICANT CHANGE UP
WBC # BLD: 10.1 K/UL — SIGNIFICANT CHANGE UP (ref 3.8–10.5)
WBC # BLD: 9.57 K/UL — SIGNIFICANT CHANGE UP (ref 3.8–10.5)
WBC # FLD AUTO: 10.1 K/UL — SIGNIFICANT CHANGE UP (ref 3.8–10.5)
WBC # FLD AUTO: 9.57 K/UL — SIGNIFICANT CHANGE UP (ref 3.8–10.5)
WBC UR QL: >50

## 2019-09-24 PROCEDURE — 73552 X-RAY EXAM OF FEMUR 2/>: CPT | Mod: 26,RT

## 2019-09-24 PROCEDURE — 72170 X-RAY EXAM OF PELVIS: CPT | Mod: 26,59

## 2019-09-24 PROCEDURE — 73502 X-RAY EXAM HIP UNI 2-3 VIEWS: CPT | Mod: 26,RT

## 2019-09-24 PROCEDURE — 71045 X-RAY EXAM CHEST 1 VIEW: CPT | Mod: 26

## 2019-09-24 PROCEDURE — 99024 POSTOP FOLLOW-UP VISIT: CPT

## 2019-09-24 PROCEDURE — 99282 EMERGENCY DEPT VISIT SF MDM: CPT

## 2019-09-24 PROCEDURE — 93010 ELECTROCARDIOGRAM REPORT: CPT | Mod: NC

## 2019-09-24 PROCEDURE — 99285 EMERGENCY DEPT VISIT HI MDM: CPT

## 2019-09-24 PROCEDURE — 73701 CT LOWER EXTREMITY W/DYE: CPT | Mod: 26,RT

## 2019-09-24 RX ORDER — OXYCODONE HYDROCHLORIDE 5 MG/1
5 TABLET ORAL EVERY 4 HOURS
Refills: 0 | Status: DISCONTINUED | OUTPATIENT
Start: 2019-09-24 | End: 2019-09-25

## 2019-09-24 RX ORDER — MAGNESIUM HYDROXIDE 400 MG/1
30 TABLET, CHEWABLE ORAL DAILY
Refills: 0 | Status: DISCONTINUED | OUTPATIENT
Start: 2019-09-24 | End: 2019-09-25

## 2019-09-24 RX ORDER — ENOXAPARIN SODIUM 100 MG/ML
40 INJECTION SUBCUTANEOUS ONCE
Refills: 0 | Status: DISCONTINUED | OUTPATIENT
Start: 2019-09-24 | End: 2019-09-25

## 2019-09-24 RX ORDER — SODIUM CHLORIDE 9 MG/ML
1000 INJECTION INTRAMUSCULAR; INTRAVENOUS; SUBCUTANEOUS
Refills: 0 | Status: DISCONTINUED | OUTPATIENT
Start: 2019-09-24 | End: 2019-09-25

## 2019-09-24 RX ORDER — SODIUM CHLORIDE 9 MG/ML
1000 INJECTION, SOLUTION INTRAVENOUS ONCE
Refills: 0 | Status: COMPLETED | OUTPATIENT
Start: 2019-09-24 | End: 2019-09-24

## 2019-09-24 RX ORDER — ACETAMINOPHEN 500 MG
975 TABLET ORAL EVERY 8 HOURS
Refills: 0 | Status: DISCONTINUED | OUTPATIENT
Start: 2019-09-24 | End: 2019-09-25

## 2019-09-24 RX ORDER — CITALOPRAM 10 MG/1
40 TABLET, FILM COATED ORAL DAILY
Refills: 0 | Status: DISCONTINUED | OUTPATIENT
Start: 2019-09-24 | End: 2019-09-25

## 2019-09-24 RX ORDER — BUPROPION HYDROCHLORIDE 150 MG/1
300 TABLET, EXTENDED RELEASE ORAL DAILY
Refills: 0 | Status: DISCONTINUED | OUTPATIENT
Start: 2019-09-24 | End: 2019-09-25

## 2019-09-24 RX ORDER — DOCUSATE SODIUM 100 MG
100 CAPSULE ORAL THREE TIMES A DAY
Refills: 0 | Status: DISCONTINUED | OUTPATIENT
Start: 2019-09-24 | End: 2019-09-25

## 2019-09-24 RX ORDER — SENNA PLUS 8.6 MG/1
2 TABLET ORAL AT BEDTIME
Refills: 0 | Status: DISCONTINUED | OUTPATIENT
Start: 2019-09-24 | End: 2019-09-25

## 2019-09-24 RX ORDER — LANOLIN ALCOHOL/MO/W.PET/CERES
3 CREAM (GRAM) TOPICAL AT BEDTIME
Refills: 0 | Status: DISCONTINUED | OUTPATIENT
Start: 2019-09-24 | End: 2019-09-25

## 2019-09-24 RX ORDER — OXYCODONE HYDROCHLORIDE 5 MG/1
2.5 TABLET ORAL EVERY 4 HOURS
Refills: 0 | Status: DISCONTINUED | OUTPATIENT
Start: 2019-09-24 | End: 2019-09-25

## 2019-09-24 RX ADMIN — SODIUM CHLORIDE 1000 MILLILITER(S): 9 INJECTION, SOLUTION INTRAVENOUS at 19:53

## 2019-09-24 RX ADMIN — Medication 975 MILLIGRAM(S): at 21:00

## 2019-09-24 RX ADMIN — SODIUM CHLORIDE 75 MILLILITER(S): 9 INJECTION INTRAMUSCULAR; INTRAVENOUS; SUBCUTANEOUS at 23:39

## 2019-09-24 NOTE — ED ADULT NURSE REASSESSMENT NOTE - NS ED NURSE REASSESS COMMENT FT1
EMS arrived in ED for transport, report to EMT, transfer paperwork complete, pt moved onto stretcher w/o complication, transferred to [facility].  (break)

## 2019-09-24 NOTE — ED ADULT NURSE NOTE - OBJECTIVE STATEMENT
CARRINGTONMIGUEL tx from [facility] ED. s/p right femur fx repair the beginning of this month, presents to ED with redness, warmth, pain and purulent drainage from right hip surgical site. As per patient's daughter, the patient has been at rehab since her surgery and c/o pain to surgical site x 4 days however, the site was not noted to be reddened and oozing until today by staff. Denies any fevers but states she has been receiving tylenol around the clock for pain. Purulent drainage noted on dressing and site is warm to touch, edematous and red. Patient has not been ambulating yet, states she can stand with a walker to take only one or two steps.

## 2019-09-24 NOTE — ED PROVIDER NOTE - OBJECTIVE STATEMENT
62 y/o female hx MS s/p right hip IM nail for fracture on 9/7/19 dc to rehab 9/12/19 presents to ER c/o wound/hip infection. pt. states over past 2-3 days has been experiencing worsneing pain and swelling/redness to right hip and wound area from surgery. Pt. states redness has spread more over past 24 hours and has become more swollen and noticed slight drainage as well. Ortho surgeon: Dr. Christie. [facility] and for further evaluation. Pt. c/o mild pain to area. Denies fevers chills nauseas vomit weakness dizziness. 62 y/o female hx MS s/p right hip IM nail for fracture on 9/7/19 dc to rehab 9/12/19 presents to ER c/o wound/hip infection. pt. states over past 2-3 days has been experiencing worsening pain and swelling/redness to right hip and wound area from surgery. Pt. states redness has spread more over past 24 hours and has become more swollen and noticed slight drainage as well. Ortho surgeon: Dr. Christie. [facility] and for further evaluation. Pt. c/o mild pain to area. Denies fevers chills nauseas vomit weakness dizziness.

## 2019-09-24 NOTE — ED PROVIDER NOTE - MUSCULOSKELETAL MINIMAL EXAM
right hip: + erythema warmth and swelling noted. scant purulence noted from wound site - no focal fluctuance/induratiion noted.  decreased rom due to pain.

## 2019-09-24 NOTE — ED PROVIDER NOTE - PROGRESS NOTE DETAILS
Attending MD Umana: Spoke with Ortho, patient has labs from [facility], requested CXR, EKG, UA, will come see patient Attending MD Umana: Seen by ortho, NO antibiotics at this time, will admit

## 2019-09-24 NOTE — ED ADULT NURSE NOTE - OBJECTIVE STATEMENT
pt received to intake 6, aox3.  c/o pain and redness and swelling and draining to surgical site.  pt is s/p right hip repair s/p fall on 9/7.  + redness, swelling and purulent drainage to surgical site, covered in 4x4 gauze.  pt has limited ROM to right hip.  SL placed, labs sent, v/s as noted.  surgery team at bedside, awaiting further orders will queenietor

## 2019-09-24 NOTE — H&P ADULT - NSHPLABSRESULTS_GEN_ALL_CORE
10.9   10.1  )-----------( 491      ( 24 Sep 2019 18:45 )             32.4   09-24    137  |  98  |  12  ----------------------------<  101<H>  3.8   |  27  |  0.47<L>    Ca    9.0      24 Sep 2019 18:45    TPro  6.3  /  Alb  3.8  /  TBili  1.2  /  DBili  x   /  AST  12  /  ALT  9<L>  /  AlkPhos  154<H>  09-24      CT showing communicating sinus tract from skin to GT  XRs negative for osteomyelitis

## 2019-09-24 NOTE — H&P ADULT - ATTENDING COMMENTS
s/o hi pim nail  infection and purulnace at the proximal incision  will need irrigation and debridement and IV antibiotics

## 2019-09-24 NOTE — ED ADULT NURSE REASSESSMENT NOTE - NS ED NURSE REASSESS COMMENT FT1
Received report from Ailyn SANABRIA. Pt. A&Ox3, sitting up in stretcher, pt. does not appear to be in any acute distress- nonlabored breathing noted and pt. is speaking in full coherent sentences. Pt. is admiitted and awaiting bed assignment. Clean dressing applied to site. Safety and comfort provided.

## 2019-09-24 NOTE — ED PROVIDER NOTE - CLINICAL SUMMARY MEDICAL DECISION MAKING FREE TEXT BOX
62 y/o female s/p ORIF 9/7/19 right hip c/o redness pain and swelling  -r/o abscess/hardware infection  -labs, xray, pain control  -abx, ortho consult  -admission

## 2019-09-24 NOTE — H&P ADULT - HISTORY OF PRESENT ILLNESS
63yoF s/p R hip IMN 9/7 c/o R hip swelling, erythema, and purulent discharge since 9/21 while at rehab. Denies fevers/chills but was using tylenol at rehab. Denies numbness/tingling. Able to bear weight. No other bone/joint complaints.

## 2019-09-24 NOTE — ED PROVIDER NOTE - PROGRESS NOTE DETAILS
Discussed with Ortho, no current definitive plan for antibiotics at this time, requesting transfer to [facility] for evaluation with Dr. Sidhu

## 2019-09-24 NOTE — ED PROVIDER NOTE - ATTENDING CONTRIBUTION TO CARE
62 y/o female s/p ORIF 9/7/19 right hip c/o redness pain and swelling  -r/o abscess/hardware infection  -labs, xray, pain control  -abx, ortho consult  -admission    JOSE Alexandra: I have personally performed a face to face bedside history and physical examination of this patient. I have discussed the history, examination, review of systems, assessment and plan of management with the resident. I have reviewed the electronic medical record and amended it to reflect my history, review of systems, physical exam, assessment and plan.

## 2019-09-24 NOTE — H&P ADULT - NSHPPHYSICALEXAM_GEN_ALL_CORE
Vital Signs Last 24 Hrs  T(C): 37.1 (24 Sep 2019 17:53), Max: 37.6 (24 Sep 2019 13:31)  T(F): 98.8 (24 Sep 2019 17:53), Max: 99.7 (24 Sep 2019 13:31)  HR: 106 (24 Sep 2019 18:00) (102 - 108)  BP: 118/78 (24 Sep 2019 18:00) (110/62 - 122/83)  BP(mean): --  RR: 18 (24 Sep 2019 18:00) (16 - 18)  SpO2: 95% (24 Sep 2019 18:00) (94% - 99%)    PE:  Gen: NAD  Resp: Nonlabored  RLE:  Erythema about the hip  Purulent drainage from proximal incisions  distal incisions healing well  SILT DP/SP/Cook/Sap/T  Fire EHL/FHL/TA/G/S/HS/Q  Hip ROM limited 2/2 pain  ankle/knee ROM full painless  DP 2+

## 2019-09-24 NOTE — H&P ADULT - ASSESSMENT
A/P: 63yoF with infected R hip IMN    OR tomorrow for I&D  NPO @midnight/IVF  Pain control  FU preop labs  Needs Medical clearance (aware)  WBAT  FU Blood cultures

## 2019-09-24 NOTE — CONSULT NOTE ADULT - SUBJECTIVE AND OBJECTIVE BOX
The patient was seen and examined tonight with active swelling and drainage of the right hip that requires orthopedic ORIF for drainage, cultures and irrigation. Her comorbidities include multiple sclerosis with  slow progression over the past 30 years, muscle spasms, osteoporosis, malnutrion with weight loss and myopathy . Exam, lab, EKG and CXR are stable. The patient is medically stable, medically optimized and has no medical contraindication to surgery tomorrow as required. Exam time 70 minutes including > than 50 % for bedside discussion and counseling. Comprehensive consultation dictated #[id number]. The patient was seen and examined tonight with active swelling and drainage of the right hip that requires orthopedic ORIF for drainage, cultures and irrigation. Her comorbidities include multiple sclerosis with  slow progression over the past 30 years, muscle spasms, osteoporosis, malnutrion with weight loss and myopathy . Exam, lab, EKG and CXR are stable. The patient is medically stable, medically optimized and has no medical contraindication to surgery tomorrow as required. Exam time 70 minutes including > than 50 % for bedside discussion and counseling. Comprehensive consultation dictated #[id number].      CONSULTING SERVICE:  Internal Medicine.    HISTORY OF PRESENT ILLNESS:  Admitted to [facility] on 09/24/2019.  This 63-year-old female has a longstanding history of multiple sclerosis for the past 30 years.  She has not received any active multiple sclerosis medications in the past five years and has had slowly progressive disease.  She began with optic neuritis and has spread to her muscles and joints.  She has significant leg weakness and walks baseline with a cane or a walker, but slowly.  She has a history of baclofen pump for muscle spasms caused by her multiple sclerosis.  She had an accidental fall in her kitchen on 09/07/2019.  She was brought to [facility] where she underwent surgery for right hip fracture with Dr. Carpenter.  She was discharged from [facility] and transferred to [facility] rehabilitation, but was unable to resume walking.  On 09/21/2019, she was noted to have increased swelling in the hip joint with erythema and the onset of discharge.  Since this time, she has had a rapidly fulminant growth of infection with abscess in the right hip that requires surgical drainage, cultures and IV antibiotics, but without the drainage procedure, it was explained to the family that it was less likely that she would get it good response to initial forms of therapy.    MEDICATIONS:  Her present medications include magnesium 8% suspension 30 mL daily, vitamin D3 1000 IU daily, senna two tablets daily, Colace 100 mg three times a day, iron sulfate 325 once a day, melatonin 3 mg at bedtime daily, Lovenox 40 mg once a day, tramadol 50 mg every 8 hours as needed for pain control as well as oxycodone 2.5 and 5 mg for rescue dosage.  She takes methenamine 1 g twice a day for urosepsis, citalopram, Celexa 40 mg once a day for depression, Wellbutrin, [id number] mg XL every morning for depression.  She is receiving a baclofen pump with continuous infusion of baclofen.    ALLERGIES:  SHE HAS ALLERGIES TO SULFA.  SHE DEVELOPS SOME SWELLING AND ALLERGIC REACTION.    SOCIAL HISTORY:  she has a history of smoking.  She stopped in 2009 with a 70 pack-year history.  She drinks only socially with no drug history.    PAST MEDICAL HISTORY:  Includes multiple sclerosis, optic neuritis, muscle spasms progressive weakness, recurrent urosepsis with incontinence.    PAST SURGICAL HISTORY:  Has been limited to vaginal delivery x1 and she had baclofen pump inserted six years ago.  These are only surgical procedures.    FAMILY HISTORY:  Unremarkable.  Essentially normal.    DIET:  Her diet is no meat, but regular food.  Her weight is presently 97 pounds.  Socially, she lives with her  and he cares for her because she is compromised from her multiple sclerosis in ambulation.    REVIEW OF SYSTEMS:  She has occasional headaches that relieved by Advil.  She has decreased hearing which is moderate decreased vision corrected by glasses.  She has no sinusitis, but she has postnasal drip and she is able to lie flat with no coughing.  She has no teeth with full dentures with no difficulty with chewing and swallowing.  She has no breathing issues.  She has no shortness of breath, cough, congestion or wheezing.  She has no cardiac complaints of high blood pressure, heart trouble chest pain, palpitations or blackouts.   Her review of systems is otherwise unremarkable.  She has no bowel or bladder dysfunction.  She has frequent urination and no blood in her stool.  She has no osteoarthritis or limitation in her hands or hips.  She has no rashes or skin problems at the present time.  She has no skin ulcerations.  Neurologically, she has progressive multiple sclerosis with loss of sensation in both feet and weakness in both legs.                  PHYSICAL EXAMINATION:  VITAL SIGNS:  At this time, shows a blood pressure of 100/60, pulse of 68, respirations 16.  HEAD AND NECK:  Examination shows bitemporal cachexia with increased turgor secondary to dehydration.  PULMONARY:  Examination shows good breath sounds bilaterally and good aeration.  She has increased turgor.  CHEST:  Examination is clear with good breath sounds bilaterally.  CARDIAC:  Examination shows no gallops or murmurs.  ABDOMEN:  Soft, cachectic with no masses, tenderness, guarding or rebound.  EXTREMITIES:  Her right hip has 4+ fluctuance with draining pus.  She appears to have a large fluctuant right hip abscess and cellulitis.  Left hip is within normal limits.  Her extremities are well-perfused.  She has good motion in all 10 toes and she has good sensation on the left, moderate sensation on the right foot    The patient's EKG shows sinus tachycardia and is otherwise within normal limits.    LABORATORY DATA:  Laboratories performed in the emergency room, CBC hemoglobin is 10.9, hematocrits is 32.4, white count is 10.1, platelet count is 491,000.  Her sodium is 137, potassium 3.8, chloride is 98, CO2 is 27, BUN is 12, creatinine is diminished at 0.47.  Her INR is 1.07.  Her PTT is 31.0.  CMP is within normal limits.  Urine has positive white cells and is sent for culture     IMPRESSION:  At this time is that the patient has an acute infection of her right hip which needs OR incision and drainage and intraoperative cultures prior to an onset of antibiotics.  There will be good relief of the fracture on the hip joint once this was opened up and the pus was allowed to come out.   The impression at this time is that the patient has a large abscess in her right hip requires intraoperative drainage and cultures with a wash of the area.  There will be a determination if the infection has invaded the right hip joint is suspicious for osteomyelitis at present.  The patient is medically stable and has no medical contraindications to surgical drainage procedure as is scheduled for later today or scheduled for tomorrow.  The patient will continue to have medical supervision in hospital to lessen the risk of postoperative complications.            She is medically stable and has no medical contraindications to surgery tomorrow as is required.        _______________________    DICT:	CRISSY VILLALOBOS MD ([id number]) 09/25/2019 07:42 AM  TRANS:	[id number] 09/25/2019 07:51 AM  JOB:	[id number]     Electronically Signed by:  CRISSY VILLALOBOS 09/26/2019 06:48:08 AM

## 2019-09-24 NOTE — ED ADULT NURSE NOTE - NSIMPLEMENTINTERV_GEN_ALL_ED
Implemented All Fall with Harm Risk Interventions:  [place] to call system. Call bell, personal items and telephone within reach. Instruct patient to call for assistance. Room bathroom lighting operational. Non-slip footwear when patient is off stretcher. Physically safe environment: no spills, clutter or unnecessary equipment. Stretcher in lowest position, wheels locked, appropriate side rails in place. Provide visual cue, wrist band, yellow gown, etc. Monitor gait and stability. Monitor for mental status changes and reorient to person, place, and time. Review medications for side effects contributing to fall risk. Reinforce activity limits and safety measures with patient and family. Provide visual clues: red socks.

## 2019-09-24 NOTE — CONSULT NOTE ADULT - SUBJECTIVE AND OBJECTIVE BOX
63y Female community ambulatory presents hx MS s/p right hip IMN nail following mechanical fall done on 2019 with Dr. Carpenter. Patient was discharged to [facility] rehab facility on 2019. Patient states her stay was uneventful until yesterday when she developed increased pain in the right hip. Daughter states she inspected incision last night which had large hematoma with pus associated with erythema and swelling around proximal incision site. Hematoma did rupture per daughter and per daughter area appears more erythematous today with increased purulent drainage. Patient denies pain at this time. Patient denies fevers/chills/nausea/vomiting/numbness/tingling/weakness.     PAST MEDICAL & SURGICAL HISTORY:  MS (multiple sclerosis)  Depression  Multiple Sclerosis: 20 years  H/O  section  History of : 10/87,     MEDICATIONS  (STANDING):    Allergies    sulfa drugs (Unknown)  sulfamethoxazole (Swelling)    Intolerances                            10.7   9.57  )-----------( 452      ( 24 Sep 2019 12:45 )             32.2     24 Sep 2019 12:45    136    |  94     |  13     ----------------------------<  71     5.0     |  24     |  0.45     Ca    8.9        24 Sep 2019 12:45    TPro  6.6    /  Alb  3.8    /  TBili  1.2    /  DBili  x      /  AST  33     /  ALT  11     /  AlkPhos  147    24 Sep 2019 12:45    PT/INR - ( 24 Sep 2019 12:45 )   PT: 12.1 SEC;   INR: 1.06          PTT - ( 24 Sep 2019 12:45 )  PTT:32.2 SEC  Vital Signs Last 24 Hrs  T(C): 37.6 (19 @ 13:31), Max: 37.6 (19 @ 13:31)  T(F): 99.7 (19 @ 13:31), Max: 99.7 (19 @ 13:31)  HR: 102 (19 @ 10:55) (102 - 102)  BP: 110/62 (19 @ 10:55) (110/62 - 110/62)  BP(mean): --  RR: 16 (19 @ 10:55) (16 - 16)  SpO2: 99% (19 @ 10:55) (99% - 99%)    Imaging:   < from: Xray Hip w/ Pelvis 2 or 3 Views, Right (19 @ 14:09) >    IMPRESSION:  Status post ORIF right hip fracture without findings of   osteomyelitis.      < end of copied text >      Physical Exam  General: NAD, Alert, Awake and oriented  Neurologic: No gross deficits, moving all 4s.  Head: NCAT without abrasions, lacerations, or ecchymosis to head, face, or scalp  Eyes: PERRL  Neck/C-Spine: FAROM. No bony TTP.  T/L Spine: No bony TTP, no palpable step-off.    HIPS and PELVIS:     RIGHT LE: No open skin. Proximal incision erythematous, warm to touch with purulent drainage. Full baseline painless ROM at hip, ankle and toes with. Negative log-roll and heel strike. Able to actively SLR. SILT toes 1-5. 2+ DP/PT pulses with brisk cap refill distally. Compartments soft and compressible.     LEFT LE: No open skin. No deformities or other signs of trauma at  knee, lower leg, ankle or foot. Full baseline painless ROM at ankle and toes with. Negative log-roll and heel strike. Able to actively SLR. SILT toes 1-5. 2+ DP/PT pulses with brisk cap refill distally. Compartments soft and compressible.       A/P: 63y Female s/p Right femur IMN 2019, now with infected right hip    1. FU Labs- CBC/BMP/Coags/ESR/CRP/Type and cross  2. FU Blood Cx  3. Pending CT with contrast right hip  3. Pain control  4. WBAT RLE  5. NPO for OR  6. FU EKG, CXR, UA  7.  Above discussed with Dr. Carpenter. Patient to be transferred to [facility] for possible right hip I&D/washout with Dr. Carpenter.

## 2019-09-25 DIAGNOSIS — G35 MULTIPLE SCLEROSIS: ICD-10-CM

## 2019-09-25 DIAGNOSIS — R52 PAIN, UNSPECIFIED: ICD-10-CM

## 2019-09-25 DIAGNOSIS — T81.49XA INFECTION FOLLOWING A PROCEDURE, OTHER SURGICAL SITE, INITIAL ENCOUNTER: ICD-10-CM

## 2019-09-25 LAB
24R-OH-CALCIDIOL SERPL-MCNC: 35 NG/ML — SIGNIFICANT CHANGE UP (ref 30–80)
ALBUMIN SERPL ELPH-MCNC: 3.6 G/DL — SIGNIFICANT CHANGE UP (ref 3.3–5)
ANION GAP SERPL CALC-SCNC: 11 MMOL/L — SIGNIFICANT CHANGE UP (ref 5–17)
ANION GAP SERPL CALC-SCNC: 9 MMOL/L — SIGNIFICANT CHANGE UP (ref 5–17)
APTT BLD: 31 SEC — SIGNIFICANT CHANGE UP (ref 27.5–36.3)
BUN SERPL-MCNC: 12 MG/DL — SIGNIFICANT CHANGE UP (ref 7–23)
BUN SERPL-MCNC: 12 MG/DL — SIGNIFICANT CHANGE UP (ref 7–23)
CALCIUM SERPL-MCNC: 8.3 MG/DL — LOW (ref 8.4–10.5)
CALCIUM SERPL-MCNC: 8.5 MG/DL — SIGNIFICANT CHANGE UP (ref 8.4–10.5)
CHLORIDE SERPL-SCNC: 103 MMOL/L — SIGNIFICANT CHANGE UP (ref 96–108)
CHLORIDE SERPL-SCNC: 105 MMOL/L — SIGNIFICANT CHANGE UP (ref 96–108)
CO2 SERPL-SCNC: 26 MMOL/L — SIGNIFICANT CHANGE UP (ref 22–31)
CO2 SERPL-SCNC: 30 MMOL/L — SIGNIFICANT CHANGE UP (ref 22–31)
CREAT SERPL-MCNC: 0.46 MG/DL — LOW (ref 0.5–1.3)
CREAT SERPL-MCNC: 0.54 MG/DL — SIGNIFICANT CHANGE UP (ref 0.5–1.3)
GLUCOSE SERPL-MCNC: 107 MG/DL — HIGH (ref 70–99)
GLUCOSE SERPL-MCNC: 94 MG/DL — SIGNIFICANT CHANGE UP (ref 70–99)
HCT VFR BLD CALC: 29.5 % — LOW (ref 34.5–45)
HCT VFR BLD CALC: 29.9 % — LOW (ref 34.5–45)
HGB BLD-MCNC: 10 G/DL — LOW (ref 11.5–15.5)
HGB BLD-MCNC: 10.5 G/DL — LOW (ref 11.5–15.5)
INR BLD: 1.07 RATIO — SIGNIFICANT CHANGE UP (ref 0.88–1.16)
MCHC RBC-ENTMCNC: 31 PG — SIGNIFICANT CHANGE UP (ref 27–34)
MCHC RBC-ENTMCNC: 33.3 PG — SIGNIFICANT CHANGE UP (ref 27–34)
MCHC RBC-ENTMCNC: 33.5 GM/DL — SIGNIFICANT CHANGE UP (ref 32–36)
MCHC RBC-ENTMCNC: 35.6 GM/DL — SIGNIFICANT CHANGE UP (ref 32–36)
MCV RBC AUTO: 92.7 FL — SIGNIFICANT CHANGE UP (ref 80–100)
MCV RBC AUTO: 93.5 FL — SIGNIFICANT CHANGE UP (ref 80–100)
PLATELET # BLD AUTO: 425 K/UL — HIGH (ref 150–400)
PLATELET # BLD AUTO: 433 K/UL — HIGH (ref 150–400)
POTASSIUM SERPL-MCNC: 3.6 MMOL/L — SIGNIFICANT CHANGE UP (ref 3.5–5.3)
POTASSIUM SERPL-MCNC: 3.8 MMOL/L — SIGNIFICANT CHANGE UP (ref 3.5–5.3)
POTASSIUM SERPL-SCNC: 3.6 MMOL/L — SIGNIFICANT CHANGE UP (ref 3.5–5.3)
POTASSIUM SERPL-SCNC: 3.8 MMOL/L — SIGNIFICANT CHANGE UP (ref 3.5–5.3)
PROTHROM AB SERPL-ACNC: 12.3 SEC — SIGNIFICANT CHANGE UP (ref 10–12.9)
RBC # BLD: 3.15 M/UL — LOW (ref 3.8–5.2)
RBC # BLD: 3.22 M/UL — LOW (ref 3.8–5.2)
RBC # FLD: 15.2 % — HIGH (ref 10.3–14.5)
RBC # FLD: 15.4 % — HIGH (ref 10.3–14.5)
SODIUM SERPL-SCNC: 142 MMOL/L — SIGNIFICANT CHANGE UP (ref 135–145)
SODIUM SERPL-SCNC: 142 MMOL/L — SIGNIFICANT CHANGE UP (ref 135–145)
SPECIMEN SOURCE: SIGNIFICANT CHANGE UP
SPECIMEN SOURCE: SIGNIFICANT CHANGE UP
WBC # BLD: 11.5 K/UL — HIGH (ref 3.8–10.5)
WBC # BLD: 7.3 K/UL — SIGNIFICANT CHANGE UP (ref 3.8–10.5)
WBC # FLD AUTO: 11.5 K/UL — HIGH (ref 3.8–10.5)
WBC # FLD AUTO: 7.3 K/UL — SIGNIFICANT CHANGE UP (ref 3.8–10.5)

## 2019-09-25 RX ORDER — BUPROPION HYDROCHLORIDE 150 MG/1
300 TABLET, EXTENDED RELEASE ORAL DAILY
Refills: 0 | Status: DISCONTINUED | OUTPATIENT
Start: 2019-09-25 | End: 2019-10-02

## 2019-09-25 RX ORDER — DOCUSATE SODIUM 100 MG
100 CAPSULE ORAL THREE TIMES A DAY
Refills: 0 | Status: DISCONTINUED | OUTPATIENT
Start: 2019-09-25 | End: 2019-10-02

## 2019-09-25 RX ORDER — SENNA PLUS 8.6 MG/1
2 TABLET ORAL AT BEDTIME
Refills: 0 | Status: DISCONTINUED | OUTPATIENT
Start: 2019-09-25 | End: 2019-10-02

## 2019-09-25 RX ORDER — OXYCODONE HYDROCHLORIDE 5 MG/1
5 TABLET ORAL EVERY 4 HOURS
Refills: 0 | Status: DISCONTINUED | OUTPATIENT
Start: 2019-09-25 | End: 2019-09-27

## 2019-09-25 RX ORDER — MORPHINE SULFATE 50 MG/1
2 CAPSULE, EXTENDED RELEASE ORAL EVERY 4 HOURS
Refills: 0 | Status: DISCONTINUED | OUTPATIENT
Start: 2019-09-25 | End: 2019-09-25

## 2019-09-25 RX ORDER — FENTANYL CITRATE 50 UG/ML
25 INJECTION INTRAVENOUS
Refills: 0 | Status: DISCONTINUED | OUTPATIENT
Start: 2019-09-25 | End: 2019-09-25

## 2019-09-25 RX ORDER — SODIUM CHLORIDE 9 MG/ML
1000 INJECTION, SOLUTION INTRAVENOUS
Refills: 0 | Status: DISCONTINUED | OUTPATIENT
Start: 2019-09-25 | End: 2019-09-25

## 2019-09-25 RX ORDER — VANCOMYCIN HCL 1 G
750 VIAL (EA) INTRAVENOUS EVERY 12 HOURS
Refills: 0 | Status: DISCONTINUED | OUTPATIENT
Start: 2019-09-25 | End: 2019-09-27

## 2019-09-25 RX ORDER — ENOXAPARIN SODIUM 100 MG/ML
40 INJECTION SUBCUTANEOUS DAILY
Refills: 0 | Status: DISCONTINUED | OUTPATIENT
Start: 2019-09-25 | End: 2019-10-02

## 2019-09-25 RX ORDER — ACETAMINOPHEN 500 MG
975 TABLET ORAL EVERY 8 HOURS
Refills: 0 | Status: DISCONTINUED | OUTPATIENT
Start: 2019-09-25 | End: 2019-10-02

## 2019-09-25 RX ORDER — OXYCODONE HYDROCHLORIDE 5 MG/1
2.5 TABLET ORAL EVERY 4 HOURS
Refills: 0 | Status: DISCONTINUED | OUTPATIENT
Start: 2019-09-25 | End: 2019-09-25

## 2019-09-25 RX ORDER — CITALOPRAM 10 MG/1
40 TABLET, FILM COATED ORAL DAILY
Refills: 0 | Status: DISCONTINUED | OUTPATIENT
Start: 2019-09-25 | End: 2019-10-02

## 2019-09-25 RX ORDER — SODIUM CHLORIDE 9 MG/ML
500 INJECTION INTRAMUSCULAR; INTRAVENOUS; SUBCUTANEOUS ONCE
Refills: 0 | Status: COMPLETED | OUTPATIENT
Start: 2019-09-25 | End: 2019-09-25

## 2019-09-25 RX ORDER — ONDANSETRON 8 MG/1
4 TABLET, FILM COATED ORAL EVERY 6 HOURS
Refills: 0 | Status: DISCONTINUED | OUTPATIENT
Start: 2019-09-25 | End: 2019-09-25

## 2019-09-25 RX ADMIN — Medication 100 MILLIGRAM(S): at 21:53

## 2019-09-25 RX ADMIN — SODIUM CHLORIDE 1000 MILLILITER(S): 9 INJECTION INTRAMUSCULAR; INTRAVENOUS; SUBCUTANEOUS at 20:32

## 2019-09-25 RX ADMIN — SENNA PLUS 2 TABLET(S): 8.6 TABLET ORAL at 21:53

## 2019-09-25 NOTE — PROGRESS NOTE ADULT - SUBJECTIVE AND OBJECTIVE BOX
No acute events overnight. Pain well controlled.     Physical Exam:     Vital Signs Last 24 Hrs  T(C): 36.7 (25 Sep 2019 04:18), Max: 37.6 (24 Sep 2019 13:31)  T(F): 98 (25 Sep 2019 04:18), Max: 99.7 (24 Sep 2019 13:31)  HR: 88 (25 Sep 2019 04:18) (88 - 108)  BP: 110/73 (25 Sep 2019 04:18) (105/73 - 122/83)  BP(mean): --  RR: 17 (25 Sep 2019 04:18) (16 - 19)  SpO2: 96% (25 Sep 2019 04:18) (94% - 99%)    	PE:  	Gen: NAD  	Resp: Nonlabored  	RLE:  	Erythema about the hip  	Purulent drainage from proximal incisions  	distal incisions healing well  	SILT DP/SP/Cook/Sap/T  	Fire EHL/FHL/TA/G/S/HS/Q  	Hip ROM limited 2/2 pain  	ankle/knee ROM full painless  DP 2+       Labs and Results:  Labs, Radiology, Cardiology, and Other Results: 10.9   	10.1  )-----------( 491      ( 24 Sep 2019 18:45 )  	           32.4   	09-24    	137  |  98  |  12  	----------------------------<  101<H>  	3.8   |  27  |  0.47<L>    	Ca    9.0      24 Sep 2019 18:45    	TPro  6.3  /  Alb  3.8  /  TBili  1.2  /  DBili  x   /  AST  12  /  ALT  9<L>  /  AlkPhos  154<H>  09-24      	CT showing communicating sinus tract from skin to GT  XRs negative for osteomyelitis    	  A/P: 63yoF with infected R hip IMN    OR today  NPO  Pain control  FU preop labs  Needs Medical clearance (aware)  WBAT  FU Blood cultures

## 2019-09-25 NOTE — PROGRESS NOTE ADULT - ATTENDING COMMENTS
I am a non participating [employer] physician seeing Pt in coverage for Dr Wagner I am a non participating [facility] physician seeing Pt in coverage for Dr Wagner. The above patient examination was reviewed with Dr. Troy and I agree with his evaluation, assessment and treatment plan.  Alexander Wagner M.D.

## 2019-09-25 NOTE — CHART NOTE - NSCHARTNOTEFT_GEN_A_CORE
Resting without complaints, family at bedside. No Chest Pain, SOB, N/V.    T(C): 36.7 (09-25-19 @ 18:45), Max: 36.8 (09-24-19 @ 20:03)  HR: 65 (09-25-19 @ 17:30) (65 - 98)  BP: 116/57 (09-25-19 @ 17:00) (105/73 - 141/73)  RR: 17 (09-25-19 @ 18:45) (14 - 17)  SpO2: 97% (09-25-19 @ 18:45) (95% - 97%)      Exam:  Alert and oriented, no acute distress  Laterality: RLE hip dressing in place, incision site C/D/I  Calves soft, non-tender bilaterally  +PF/DF/EHL/FHL  SILT  + DP pulse    Xray: in chart                          10.5   11.5  )-----------( 425      ( 25 Sep 2019 15:54 )             29.5    09-25    142  |  105  |  12  ----------------------------<  107<H>  3.8   |  26  |  0.46<L>    Ca    8.3<L>      25 Sep 2019 15:54    TPro  x   /  Alb  3.6  /  TBili  x   /  DBili  x   /  AST  x   /  ALT  x   /  AlkPhos  x   09-25      A/P: 63yFemale S/p  R Hip I&D. VSS. NAD  -PT/OT-WBAT RLE, OOB when tolerated  -IS encouraged  -DVT PPx with Lovenox  -Pain Control  -Continue Current Tx    Jyothi Moeller PA-C  Team Pager #[phone]

## 2019-09-25 NOTE — CONSULT NOTE ADULT - SUBJECTIVE AND OBJECTIVE BOX
HPI:   Patient is a 63y female with    REVIEW OF SYSTEMS:  All other review of systems negative (Comprehensive ROS)    PAST MEDICAL & SURGICAL HISTORY:  MS (multiple sclerosis)  Depression  Multiple Sclerosis: 20 years  H/O  section  History of : 10/87,       Allergies    sulfa drugs (Unknown)    Intolerances        Antimicrobials Day #    vancomycin  IVPB 750 milliGRAM(s) IV Intermittent every 12 hours    Other Medications:  acetaminophen   Tablet .. 975 milliGRAM(s) Oral every 8 hours PRN  buPROPion XL . 300 milliGRAM(s) Oral daily  citalopram 40 milliGRAM(s) Oral daily  docusate sodium 100 milliGRAM(s) Oral three times a day  enoxaparin Injectable 40 milliGRAM(s) SubCutaneous daily  fentaNYL    Injectable 25 MICROGram(s) IV Push every 5 minutes PRN  lactated ringers. 1000 milliLiter(s) IV Continuous <Continuous>  morphine  - Injectable 2 milliGRAM(s) IV Push every 4 hours PRN  ondansetron Injectable 4 milliGRAM(s) IV Push every 6 hours PRN  oxyCODONE    IR 2.5 milliGRAM(s) Oral every 4 hours PRN  oxyCODONE    IR 5 milliGRAM(s) Oral every 4 hours PRN  senna 2 Tablet(s) Oral at bedtime      FAMILY HISTORY:      SOCIAL HISTORY:  Smoking:     ETOH:     Drug Use:     Single     T(F): 97.7 (19 @ 16:30), Max: 99.1 (19 @ 17:24)  HR: 77 (19 @ 16:30)  BP: 108/60 (19 @ 16:30)  RR: 14 (19 @ 16:30)  SpO2: 96% (19 @ 16:30)  Wt(kg): --    PHYSICAL EXAM:  General: alert, no acute distress  Eyes:  anicteric, no conjunctival injection, no discharge  Oropharynx: no lesions or injection 	  Neck: supple, without adenopathy  Lungs: clear to auscultation  Heart: regular rate and rhythm; no murmur, rubs or gallops  Abdomen: soft, nondistended, nontender, without mass or organomegaly  Skin: no lesions  Extremities: no clubbing, cyanosis, or edema  Neurologic: alert, oriented, moves all extremities    LAB RESULTS:                        10.5   11.5  )-----------( 425      ( 25 Sep 2019 15:54 )             29.5         142  |  105  |  12  ----------------------------<  107<H>  3.8   |  26  |  0.46<L>    Ca    8.3<L>      25 Sep 2019 15:54    TPro  x   /  Alb  3.6  /  TBili  x   /  DBili  x   /  AST  x   /  ALT  x   /  AlkPhos  x       LIVER FUNCTIONS - ( 25 Sep 2019 04:05 )  Alb: 3.6 g/dL / Pro: x     / ALK PHOS: x     / ALT: x     / AST: x     / GGT: x           Urinalysis Basic - ( 24 Sep 2019 19:23 )    Color: Light Yellow / Appearance: Slightly Turbid / S.033 / pH: x  Gluc: x / Ketone: Negative  / Bili: Negative / Urobili: Negative   Blood: x / Protein: Trace / Nitrite: Positive   Leuk Esterase: Large / RBC: 10 /hpf / WBC >50   Sq Epi: x / Non Sq Epi: 5 / Bacteria: Many        MICROBIOLOGY REVIEWED:    RADIOLOGY REVIEWED: HPI:   Patient is a 63y female with hx Multiple sclerosis, recent fall R hip repair with IM nail, now presented from rehab with redness, swelling and drainage from R hip. Pt went to OR today now s/p I &D of an abscess and sinus tract, gross pus in soft tissue, s/p copious irrigation and closure. Pt denies any fevers or chills   REVIEW OF SYSTEMS:  All other review of systems negative (Comprehensive ROS)    PAST MEDICAL & SURGICAL HISTORY:  MS (multiple sclerosis)  Depression  Multiple Sclerosis: 20 years  H/O  section  History of : 10/87,       Allergies    sulfa drugs (Unknown)    Intolerances        Antimicrobials Day #    vancomycin  IVPB 750 milliGRAM(s) IV Intermittent every 12 hours    Other Medications:  acetaminophen   Tablet .. 975 milliGRAM(s) Oral every 8 hours PRN  buPROPion XL . 300 milliGRAM(s) Oral daily  citalopram 40 milliGRAM(s) Oral daily  docusate sodium 100 milliGRAM(s) Oral three times a day  enoxaparin Injectable 40 milliGRAM(s) SubCutaneous daily  fentaNYL    Injectable 25 MICROGram(s) IV Push every 5 minutes PRN  lactated ringers. 1000 milliLiter(s) IV Continuous <Continuous>  morphine  - Injectable 2 milliGRAM(s) IV Push every 4 hours PRN  ondansetron Injectable 4 milliGRAM(s) IV Push every 6 hours PRN  oxyCODONE    IR 2.5 milliGRAM(s) Oral every 4 hours PRN  oxyCODONE    IR 5 milliGRAM(s) Oral every 4 hours PRN  senna 2 Tablet(s) Oral at bedtime      FAMILY HISTORY:      SOCIAL HISTORY:  Smoking:     ETOH:     Drug Use:     Single     T(F): 97.7 (19 @ 16:30), Max: 99.1 (19 @ 17:24)  HR: 77 (19 @ 16:30)  BP: 108/60 (19 @ 16:30)  RR: 14 (19 @ 16:30)  SpO2: 96% (19 @ 16:30)  Wt(kg): --    PHYSICAL EXAM:  General: alert, no acute distress  Eyes:  anicteric, no conjunctival injection, no discharge  Oropharynx: no lesions or injection 	  Neck: supple, without adenopathy  Lungs: clear to auscultation  Heart: regular rate and rhythm; no murmur, rubs or gallops  Abdomen: soft, nondistended, nontender, without mass or organomegaly  Skin: no lesions  Extremities: no clubbing, cyanosis, or edema, R hip dressed  Neurologic: alert, oriented, moves all extremities    LAB RESULTS:                        10.5   11.5  )-----------( 425      ( 25 Sep 2019 15:54 )             29.5         142  |  105  |  12  ----------------------------<  107<H>  3.8   |  26  |  0.46<L>    Ca    8.3<L>      25 Sep 2019 15:54    TPro  x   /  Alb  3.6  /  TBili  x   /  DBili  x   /  AST  x   /  ALT  x   /  AlkPhos  x       LIVER FUNCTIONS - ( 25 Sep 2019 04:05 )  Alb: 3.6 g/dL / Pro: x     / ALK PHOS: x     / ALT: x     / AST: x     / GGT: x           Urinalysis Basic - ( 24 Sep 2019 19:23 )    Color: Light Yellow / Appearance: Slightly Turbid / S.033 / pH: x  Gluc: x / Ketone: Negative  / Bili: Negative / Urobili: Negative   Blood: x / Protein: Trace / Nitrite: Positive   Leuk Esterase: Large / RBC: 10 /hpf / WBC >50   Sq Epi: x / Non Sq Epi: 5 / Bacteria: Many        MICROBIOLOGY REVIEWED:  Culture - Blood (19 @ 15:09)    Culture - Blood:   NO ORGANISMS ISOLATED  NO ORGANISMS ISOLATED AT 24 HOURS    Specimen Source: BLOOD PERIPHERAL      RADIOLOGY REVIEWED:  < from: Xray Chest 1 View- PORTABLE-Urgent (19 @ 18:35) >  IMPRESSION:  Clear lungs.    < end of copied text >

## 2019-09-25 NOTE — CONSULT NOTE ADULT - ASSESSMENT
63y female with hx Multiple sclerosis, recent fall R hip repair with IM nail, now presented from rehab with redness, swelling and drainage from R hip.   Pt went to OR today now s/p I &D of an abscess and sinus tract, gross pus in soft tissue, s/p copious irrigation and closure.   Pt denies any fevers or chills     PLAN:  cont empiric Vanco for now  f/u cultures  duration and route of abx to be determined based on operative cultures

## 2019-09-25 NOTE — PRE-ANESTHESIA EVALUATION ADULT - NSANTHPMHFT_GEN_ALL_CORE
Chart reviewed. Pmhx as above. MS w/ slow progression over 30 years.   Medical clearance in chart.  CT showing communicating sinus tract from skin to GT Chart reviewed. Recent THR. Admitted from rehab/[facility].  Pmhx as above. MS w/ slow progression over 30 years. Optic neuritis ~ 30 years ago, steroid dose ~ 2 months ago + ambulates with a walker/chair prior to surgery. No dysphagia or problem swallowing or breathing, no home o2. No CAD CHF CKD DM  Medical clearance in chart.  CT showing communicating sinus tract from skin to GT

## 2019-09-25 NOTE — PHYSICAL THERAPY INITIAL EVALUATION ADULT - ADDITIONAL COMMENTS
As per : pt was recently at rehab however lives with him in a house w/ 4-5 steps to enter. Pt was ambulating w/ RW and WC for community ambulation.

## 2019-09-25 NOTE — PROGRESS NOTE ADULT - SUBJECTIVE AND OBJECTIVE BOX
The patient was seen and examined  with active swelling and drainage of the right hip that requires orthopedic ORIF for drainage, cultures and irrigation. Her comorbidities include multiple sclerosis with  slow progression over the past 30 years, muscle spasms, osteoporosis, malnutrion with weight loss and myopathy . Exam, lab, EKG and CXR are stable. seen pre op resting comfortably.     MEDICATIONS  (STANDING):  buPROPion XL . 300 milliGRAM(s) Oral daily  citalopram 40 milliGRAM(s) Oral daily  docusate sodium 100 milliGRAM(s) Oral three times a day  enoxaparin Injectable 40 milliGRAM(s) SubCutaneous daily  senna 2 Tablet(s) Oral at bedtime  vancomycin  IVPB 750 milliGRAM(s) IV Intermittent every 12 hours    MEDICATIONS  (PRN):  acetaminophen   Tablet .. 975 milliGRAM(s) Oral every 8 hours PRN Temp greater or equal to 38C (100.4F), Mild Pain (1 - 3)  morphine  - Injectable 2 milliGRAM(s) IV Push every 4 hours PRN breakthrough pain  oxyCODONE    IR 2.5 milliGRAM(s) Oral every 4 hours PRN Moderate Pain (4 - 6)  oxyCODONE    IR 5 milliGRAM(s) Oral every 4 hours PRN Severe Pain (7 - 10)          VITALS:   T(C): 36.6 (19 @ 21:45), Max: 37 (19 @ 20:45)  HR: 76 (19 @ 21:45) (65 - 98)  BP: 96/60 (19 @ 21:45) (91/60 - 141/73)  RR: 18 (19 @ 21:45) (14 - 18)  SpO2: 95% (19 @ 21:45) (95% - 97%)  Wt(kg): --        LABS:        CBC Full  -  ( 25 Sep 2019 15:54 )  WBC Count : 11.5 K/uL  RBC Count : 3.15 M/uL  Hemoglobin : 10.5 g/dL  Hematocrit : 29.5 %  Platelet Count - Automated : 425 K/uL  Mean Cell Volume : 93.5 fl  Mean Cell Hemoglobin : 33.3 pg  Mean Cell Hemoglobin Concentration : 35.6 gm/dL  Auto Neutrophil # : x  Auto Lymphocyte # : x  Auto Monocyte # : x  Auto Eosinophil # : x  Auto Basophil # : x  Auto Neutrophil % : x  Auto Lymphocyte % : x  Auto Monocyte % : x  Auto Eosinophil % : x  Auto Basophil % : x        142  |  105  |  12  ----------------------------<  107<H>  3.8   |  26  |  0.46<L>    Ca    8.3<L>      25 Sep 2019 15:54    TPro  x   /  Alb  3.6  /  TBili  x   /  DBili  x   /  AST  x   /  ALT  x   /  AlkPhos  x       LIVER FUNCTIONS - ( 25 Sep 2019 04:05 )  Alb: 3.6 g/dL / Pro: x     / ALK PHOS: x     / ALT: x     / AST: x     / GGT: x           PT/INR - ( 25 Sep 2019 04:04 )   PT: 12.3 sec;   INR: 1.07 ratio         PTT - ( 25 Sep 2019 04:04 )  PTT:31.0 sec  Urinalysis Basic - ( 24 Sep 2019 19:23 )    Color: Light Yellow / Appearance: Slightly Turbid / S.033 / pH: x  Gluc: x / Ketone: Negative  / Bili: Negative / Urobili: Negative   Blood: x / Protein: Trace / Nitrite: Positive   Leuk Esterase: Large / RBC: 10 /hpf / WBC >50   Sq Epi: x / Non Sq Epi: 5 / Bacteria: Many      CAPILLARY BLOOD GLUCOSE          RADIOLOGY & ADDITIONAL TESTS:

## 2019-09-25 NOTE — PHYSICAL THERAPY INITIAL EVALUATION ADULT - PERTINENT HX OF CURRENT PROBLEM, REHAB EVAL
Pt is a 63yoF s/p R hip IMN 9/7 c/o R hip swelling, erythema, and purulent discharge since 9/21 while at rehab. Found to be infected. Denies fevers/chills but was using tylenol at rehab. Denies numbness/tingling. Able to bear weight. No other bone/joint complaints. now s/p R hip I &D of an abscess and sinus tract.

## 2019-09-26 LAB
ANION GAP SERPL CALC-SCNC: 10 MMOL/L — SIGNIFICANT CHANGE UP (ref 5–17)
APPEARANCE UR: ABNORMAL
BACTERIA # UR AUTO: SIGNIFICANT CHANGE UP
BILIRUB UR-MCNC: NEGATIVE — SIGNIFICANT CHANGE UP
BUN SERPL-MCNC: 12 MG/DL — SIGNIFICANT CHANGE UP (ref 7–23)
CALCIUM SERPL-MCNC: 8.7 MG/DL — SIGNIFICANT CHANGE UP (ref 8.4–10.5)
CHLORIDE SERPL-SCNC: 106 MMOL/L — SIGNIFICANT CHANGE UP (ref 96–108)
CO2 SERPL-SCNC: 26 MMOL/L — SIGNIFICANT CHANGE UP (ref 22–31)
COLOR SPEC: YELLOW — SIGNIFICANT CHANGE UP
CREAT SERPL-MCNC: 0.51 MG/DL — SIGNIFICANT CHANGE UP (ref 0.5–1.3)
DIFF PNL FLD: ABNORMAL
EPI CELLS # UR: 1 — SIGNIFICANT CHANGE UP
GLUCOSE SERPL-MCNC: 86 MG/DL — SIGNIFICANT CHANGE UP (ref 70–99)
GLUCOSE UR QL: NEGATIVE — SIGNIFICANT CHANGE UP
HCT VFR BLD CALC: 25.9 % — LOW (ref 34.5–45)
HGB BLD-MCNC: 8.6 G/DL — LOW (ref 11.5–15.5)
HYALINE CASTS # UR AUTO: 5 /LPF — SIGNIFICANT CHANGE UP (ref 0–7)
KETONES UR-MCNC: NEGATIVE — SIGNIFICANT CHANGE UP
LEUKOCYTE ESTERASE UR-ACNC: ABNORMAL
MCHC RBC-ENTMCNC: 31.1 PG — SIGNIFICANT CHANGE UP (ref 27–34)
MCHC RBC-ENTMCNC: 33.1 GM/DL — SIGNIFICANT CHANGE UP (ref 32–36)
MCV RBC AUTO: 93.8 FL — SIGNIFICANT CHANGE UP (ref 80–100)
NITRITE UR-MCNC: POSITIVE
PH UR: 6 — SIGNIFICANT CHANGE UP (ref 5–8)
PLATELET # BLD AUTO: 456 K/UL — HIGH (ref 150–400)
POTASSIUM SERPL-MCNC: 3.8 MMOL/L — SIGNIFICANT CHANGE UP (ref 3.5–5.3)
POTASSIUM SERPL-SCNC: 3.8 MMOL/L — SIGNIFICANT CHANGE UP (ref 3.5–5.3)
PROT UR-MCNC: ABNORMAL
RBC # BLD: 2.76 M/UL — LOW (ref 3.8–5.2)
RBC # FLD: 15.2 % — HIGH (ref 10.3–14.5)
RBC CASTS # UR COMP ASSIST: 5 /HPF — HIGH (ref 0–4)
SODIUM SERPL-SCNC: 142 MMOL/L — SIGNIFICANT CHANGE UP (ref 135–145)
SP GR SPEC: 1.01 — SIGNIFICANT CHANGE UP (ref 1.01–1.02)
UROBILINOGEN FLD QL: NEGATIVE — SIGNIFICANT CHANGE UP
WBC # BLD: 6.4 K/UL — SIGNIFICANT CHANGE UP (ref 3.8–10.5)
WBC # FLD AUTO: 6.4 K/UL — SIGNIFICANT CHANGE UP (ref 3.8–10.5)
WBC UR QL: >50

## 2019-09-26 PROCEDURE — 11044 DBRDMT BONE 1ST 20 SQ CM/<: CPT | Mod: 58,RT

## 2019-09-26 RX ORDER — CEFTRIAXONE 500 MG/1
1000 INJECTION, POWDER, FOR SOLUTION INTRAMUSCULAR; INTRAVENOUS EVERY 24 HOURS
Refills: 0 | Status: DISCONTINUED | OUTPATIENT
Start: 2019-09-26 | End: 2019-09-28

## 2019-09-26 RX ORDER — CEFTRIAXONE 500 MG/1
1000 INJECTION, POWDER, FOR SOLUTION INTRAMUSCULAR; INTRAVENOUS EVERY 24 HOURS
Refills: 0 | Status: DISCONTINUED | OUTPATIENT
Start: 2019-09-26 | End: 2019-09-26

## 2019-09-26 RX ADMIN — Medication 250 MILLIGRAM(S): at 15:14

## 2019-09-26 RX ADMIN — CITALOPRAM 40 MILLIGRAM(S): 10 TABLET, FILM COATED ORAL at 15:14

## 2019-09-26 RX ADMIN — CEFTRIAXONE 100 MILLIGRAM(S): 500 INJECTION, POWDER, FOR SOLUTION INTRAMUSCULAR; INTRAVENOUS at 18:21

## 2019-09-26 RX ADMIN — ENOXAPARIN SODIUM 40 MILLIGRAM(S): 100 INJECTION SUBCUTANEOUS at 11:15

## 2019-09-26 RX ADMIN — Medication 975 MILLIGRAM(S): at 11:44

## 2019-09-26 RX ADMIN — Medication 100 MILLIGRAM(S): at 05:22

## 2019-09-26 RX ADMIN — BUPROPION HYDROCHLORIDE 300 MILLIGRAM(S): 150 TABLET, EXTENDED RELEASE ORAL at 15:13

## 2019-09-26 RX ADMIN — Medication 250 MILLIGRAM(S): at 01:32

## 2019-09-26 RX ADMIN — Medication 975 MILLIGRAM(S): at 11:14

## 2019-09-26 NOTE — PROGRESS NOTE ADULT - SUBJECTIVE AND OBJECTIVE BOX
The patient was seen and examined  with active swelling and drainage of the right hip that requires orthopedic ORIF for drainage, cultures and irrigation. Her comorbidities include multiple sclerosis with  slow progression over the past 30 years, muscle spasms, osteoporosis, malnutrion with weight loss and myopathy . Exam, lab, EKG and CXR are stable. Patient seen OOB to chair. states pain is well controlled      MEDICATIONS  (STANDING):  buPROPion XL . 300 milliGRAM(s) Oral daily  cefTRIAXone   IVPB 1000 milliGRAM(s) IV Intermittent every 24 hours  citalopram 40 milliGRAM(s) Oral daily  docusate sodium 100 milliGRAM(s) Oral three times a day  enoxaparin Injectable 40 milliGRAM(s) SubCutaneous daily  senna 2 Tablet(s) Oral at bedtime  vancomycin  IVPB 750 milliGRAM(s) IV Intermittent every 12 hours    MEDICATIONS  (PRN):  acetaminophen   Tablet .. 975 milliGRAM(s) Oral every 8 hours PRN Temp greater or equal to 38C (100.4F), Mild Pain (1 - 3)  morphine  - Injectable 2 milliGRAM(s) IV Push every 4 hours PRN breakthrough pain  oxyCODONE    IR 2.5 milliGRAM(s) Oral every 4 hours PRN Moderate Pain (4 - 6)  oxyCODONE    IR 5 milliGRAM(s) Oral every 4 hours PRN Severe Pain (7 - 10)          VITALS:   T(C): 36.4 (19 @ 17:22), Max: 36.7 (19 @ 05:29)  HR: 95 (19 @ 17:22) (76 - 95)  BP: 114/67 (19 @ 17:22) (91/55 - 114/67)  RR: 18 (19 @ 17:22) (18 - 18)  SpO2: 96% (19 @ 17:22) (92% - 97%)  Wt(kg): --    PHYSICAL EXAM:  GENERAL: NAD, well nourished and conversant  HEAD:  Atraumatic  EYES: EOM, PERRLA, conjunctiva pink and sclera white  ENT: No tonsillar erythema, exudates, or enlargement, moist mucous membranes, good dentition, no lesions  NECK: Supple, No JVD, normal thyroid, carotids with normal upstrokes and no bruits  CHEST/LUNG: Clear to auscultation bilaterally, No rales, rhonchi, wheezing, or rubs  HEART: Regular rate and rhythm, No murmurs, rubs, or gallops  ABDOMEN: Soft, nondistended, no masses, guarding, tenderness or rebound, bowel sounds present  EXTREMITIES:  2+ Peripheral Pulses, No clubbing, cyanosis, or edema. s/p re[aojf pf ;ef  LYMPH: No lymphadenopathy noted  SKIN: No rashes or lesions  NERVOUS SYSTEM:  Alert & Oriented X3, normal cognitive function. Motor Strength 5/5 right upper and right lower.  5/5 left upper and left lower extremities, DTRs 2+ intact and symmetric      LABS:        CBC Full  -  ( 26 Sep 2019 06:41 )  WBC Count : 6.4 K/uL  RBC Count : 2.76 M/uL  Hemoglobin : 8.6 g/dL  Hematocrit : 25.9 %  Platelet Count - Automated : 456 K/uL  Mean Cell Volume : 93.8 fl  Mean Cell Hemoglobin : 31.1 pg  Mean Cell Hemoglobin Concentration : 33.1 gm/dL  Auto Neutrophil # : x  Auto Lymphocyte # : x  Auto Monocyte # : x  Auto Eosinophil # : x  Auto Basophil # : x  Auto Neutrophil % : x  Auto Lymphocyte % : x  Auto Monocyte % : x  Auto Eosinophil % : x  Auto Basophil % : x        142  |  106  |  12  ----------------------------<  86  3.8   |  26  |  0.51    Ca    8.7      26 Sep 2019 06:40    TPro  x   /  Alb  3.6  /  TBili  x   /  DBili  x   /  AST  x   /  ALT  x   /  AlkPhos  x       LIVER FUNCTIONS - ( 25 Sep 2019 04:05 )  Alb: 3.6 g/dL / Pro: x     / ALK PHOS: x     / ALT: x     / AST: x     / GGT: x           PT/INR - ( 25 Sep 2019 04:04 )   PT: 12.3 sec;   INR: 1.07 ratio         PTT - ( 25 Sep 2019 04:04 )  PTT:31.0 sec  Urinalysis Basic - ( 26 Sep 2019 11:35 )    Color: Yellow / Appearance: Turbid / S.014 / pH: x  Gluc: x / Ketone: Negative  / Bili: Negative / Urobili: Negative   Blood: x / Protein: 30 mg/dL / Nitrite: Positive   Leuk Esterase: Large / RBC: 5 /hpf / WBC >50   Sq Epi: x / Non Sq Epi: 1 / Bacteria: many      CAPILLARY BLOOD GLUCOSE          RADIOLOGY & ADDITIONAL TESTS:

## 2019-09-26 NOTE — PROGRESS NOTE ADULT - SUBJECTIVE AND OBJECTIVE BOX
CC: f/u for R hip IM nail infection    Patient reports no new events overnignt    REVIEW OF SYSTEMS: no fevers, no chills, no nausea, no vomiting, no abd pain, no resp symptoms  All other review of systems negative (Comprehensive ROS)    Antimicrobials Day #    vancomycin  IVPB 750 milliGRAM(s) IV Intermittent every 12 hours    Other Medications Reviewed    T(F): 97.5 (19 @ 09:24), Max: 98.6 (19 @ 20:45)  HR: 80 (19 @ 09:24)  BP: 105/62 (19 @ 09:24)  RR: 18 (19 @ 09:24)  SpO2: 96% (19 @ 09:24)    PHYSICAL EXAM:  General: alert, no acute distress  Eyes:  anicteric, no conjunctival injection, no discharge  Oropharynx: no lesions or injection 	  Neck: supple, without adenopathy  Lungs: clear to auscultation  Heart: regular rate and rhythm; no murmur, rubs or gallops  Abdomen: soft, nondistended, nontender, without mass or organomegaly  Skin: no lesions  Extremities: no clubbing, cyanosis, or edema R hip dressed  Neurologic: alert, oriented, moves all extremities    LAB RESULTS:                        8.6    6.4   )-----------( 456      ( 26 Sep 2019 06:41 )             25.9         142  |  106  |  12  ----------------------------<  86  3.8   |  26  |  0.51    Ca    8.7      26 Sep 2019 06:40    TPro  x   /  Alb  3.6  /  TBili  x   /  DBili  x   /  AST  x   /  ALT  x   /  AlkPhos  x       LIVER FUNCTIONS - ( 25 Sep 2019 04:05 )  Alb: 3.6 g/dL / Pro: x     / ALK PHOS: x     / ALT: x     / AST: x     / GGT: x           Urinalysis Basic - ( 24 Sep 2019 19:23 )    Color: Light Yellow / Appearance: Slightly Turbid / S.033 / pH: x  Gluc: x / Ketone: Negative  / Bili: Negative / Urobili: Negative   Blood: x / Protein: Trace / Nitrite: Positive   Leuk Esterase: Large / RBC: 10 /hpf / WBC >50   Sq Epi: x / Non Sq Epi: 5 / Bacteria: Many        MICROBIOLOGY REVIEWED:    RADIOLOGY REVIEWED:

## 2019-09-26 NOTE — PROGRESS NOTE ADULT - ASSESSMENT
A/P: 63F s/p R femur IMN I&D  FU OR cultures  Vancomycin pending cultures  Appreciate ID recs  Analgesia  DVT ppx  WBAT  PT/OT  FU labs  DC planning

## 2019-09-26 NOTE — PROGRESS NOTE ADULT - ASSESSMENT
63y female with hx Multiple sclerosis, recent fall R hip repair with IM nail, now presented from rehab with redness, swelling and drainage from R hip.   Now s/p I &D of an abscess and sinus tract, gross pus in soft tissue, s/p copious irrigation and closure.   Pt denies any fevers or chills     PLAN:  cont empiric Vanco for now  f/u cultures  duration and route of abx to be determined based on operative cultures

## 2019-09-26 NOTE — PROGRESS NOTE ADULT - SUBJECTIVE AND OBJECTIVE BOX
Pt S/E at bedside, no acute events overnight, pain controlled    Vital Signs Last 24 Hrs  T(C): 36.7 (26 Sep 2019 05:29), Max: 37 (25 Sep 2019 20:45)  T(F): 98 (26 Sep 2019 05:29), Max: 98.6 (25 Sep 2019 20:45)  HR: 88 (26 Sep 2019 05:29) (65 - 98)  BP: 108/72 (26 Sep 2019 05:29) (91/55 - 141/73)  BP(mean): 80 (25 Sep 2019 17:00) (79 - 102)  RR: 18 (26 Sep 2019 05:29) (14 - 18)  SpO2: 92% (26 Sep 2019 05:29) (92% - 97%)    Gen: NAD    Right Lower Extremity:  Dressing clean dry intact  +EHL/FHL/TA/GS  SILT L3-S1  +DP/PT Pulses  Compartments soft  No calf TTP B/L

## 2019-09-26 NOTE — PROGRESS NOTE ADULT - ATTENDING COMMENTS
I am a non participating [facility] physician seeing Pt in coverage for Dr Wagner. The above patient examination was reviewed with Dr. Troy and I agree with his evaluation, assessment and treatment plan.  Alexander Wagner M.D.

## 2019-09-27 LAB
-  AMPICILLIN/SULBACTAM: SIGNIFICANT CHANGE UP
-  AMPICILLIN/SULBACTAM: SIGNIFICANT CHANGE UP
-  CEFAZOLIN: SIGNIFICANT CHANGE UP
-  CEFAZOLIN: SIGNIFICANT CHANGE UP
-  CLINDAMYCIN: SIGNIFICANT CHANGE UP
-  CLINDAMYCIN: SIGNIFICANT CHANGE UP
-  DAPTOMYCIN: SIGNIFICANT CHANGE UP
-  DAPTOMYCIN: SIGNIFICANT CHANGE UP
-  ERYTHROMYCIN: SIGNIFICANT CHANGE UP
-  ERYTHROMYCIN: SIGNIFICANT CHANGE UP
-  GENTAMICIN: SIGNIFICANT CHANGE UP
-  GENTAMICIN: SIGNIFICANT CHANGE UP
-  LINEZOLID: SIGNIFICANT CHANGE UP
-  LINEZOLID: SIGNIFICANT CHANGE UP
-  OXACILLIN: SIGNIFICANT CHANGE UP
-  OXACILLIN: SIGNIFICANT CHANGE UP
-  PENICILLIN: SIGNIFICANT CHANGE UP
-  PENICILLIN: SIGNIFICANT CHANGE UP
-  RIFAMPIN: SIGNIFICANT CHANGE UP
-  RIFAMPIN: SIGNIFICANT CHANGE UP
-  TETRACYCLINE: SIGNIFICANT CHANGE UP
-  TETRACYCLINE: SIGNIFICANT CHANGE UP
-  TRIMETHOPRIM/SULFAMETHOXAZOLE: SIGNIFICANT CHANGE UP
-  TRIMETHOPRIM/SULFAMETHOXAZOLE: SIGNIFICANT CHANGE UP
-  VANCOMYCIN: SIGNIFICANT CHANGE UP
-  VANCOMYCIN: SIGNIFICANT CHANGE UP
METHOD TYPE: SIGNIFICANT CHANGE UP
METHOD TYPE: SIGNIFICANT CHANGE UP
VANCOMYCIN TROUGH SERPL-MCNC: 7.7 UG/ML — LOW (ref 10–20)

## 2019-09-27 RX ORDER — VANCOMYCIN HCL 1 G
1000 VIAL (EA) INTRAVENOUS EVERY 12 HOURS
Refills: 0 | Status: DISCONTINUED | OUTPATIENT
Start: 2019-09-27 | End: 2019-10-02

## 2019-09-27 RX ORDER — VANCOMYCIN HCL 1 G
VIAL (EA) INTRAVENOUS
Refills: 0 | Status: DISCONTINUED | OUTPATIENT
Start: 2019-09-27 | End: 2019-10-02

## 2019-09-27 RX ORDER — VANCOMYCIN HCL 1 G
1000 VIAL (EA) INTRAVENOUS ONCE
Refills: 0 | Status: COMPLETED | OUTPATIENT
Start: 2019-09-27 | End: 2019-09-27

## 2019-09-27 RX ORDER — ACETAMINOPHEN 500 MG
1000 TABLET ORAL ONCE
Refills: 0 | Status: COMPLETED | OUTPATIENT
Start: 2019-09-27 | End: 2019-09-27

## 2019-09-27 RX ADMIN — ENOXAPARIN SODIUM 40 MILLIGRAM(S): 100 INJECTION SUBCUTANEOUS at 11:22

## 2019-09-27 RX ADMIN — Medication 250 MILLIGRAM(S): at 03:30

## 2019-09-27 RX ADMIN — Medication 100 MILLIGRAM(S): at 13:07

## 2019-09-27 RX ADMIN — Medication 250 MILLIGRAM(S): at 14:53

## 2019-09-27 RX ADMIN — SENNA PLUS 2 TABLET(S): 8.6 TABLET ORAL at 21:46

## 2019-09-27 RX ADMIN — OXYCODONE HYDROCHLORIDE 5 MILLIGRAM(S): 5 TABLET ORAL at 15:40

## 2019-09-27 RX ADMIN — Medication 975 MILLIGRAM(S): at 13:09

## 2019-09-27 RX ADMIN — CEFTRIAXONE 100 MILLIGRAM(S): 500 INJECTION, POWDER, FOR SOLUTION INTRAMUSCULAR; INTRAVENOUS at 21:45

## 2019-09-27 RX ADMIN — CITALOPRAM 40 MILLIGRAM(S): 10 TABLET, FILM COATED ORAL at 11:21

## 2019-09-27 RX ADMIN — BUPROPION HYDROCHLORIDE 300 MILLIGRAM(S): 150 TABLET, EXTENDED RELEASE ORAL at 11:21

## 2019-09-27 RX ADMIN — Medication 100 MILLIGRAM(S): at 21:46

## 2019-09-27 RX ADMIN — OXYCODONE HYDROCHLORIDE 5 MILLIGRAM(S): 5 TABLET ORAL at 14:52

## 2019-09-27 RX ADMIN — Medication 975 MILLIGRAM(S): at 13:39

## 2019-09-27 NOTE — PROGRESS NOTE ADULT - SUBJECTIVE AND OBJECTIVE BOX
Pt S/E at bedside, no acute events overnight, pain controlled    Vital Signs Last 24 Hrs  T(C): 36.8 (27 Sep 2019 05:09), Max: 37.2 (26 Sep 2019 22:13)  T(F): 98.3 (27 Sep 2019 05:09), Max: 98.9 (26 Sep 2019 22:13)  HR: 77 (27 Sep 2019 05:09) (77 - 95)  BP: 108/- (27 Sep 2019 05:09) (95/65 - 114/67)  BP(mean): --  RR: 18 (27 Sep 2019 05:09) (18 - 18)  SpO2: 96% (27 Sep 2019 05:09) (95% - 97%)    Gen: NAD    Right Lower Extremity:  Dressing clean dry intact  +EHL/FHL/TA/GS  SILT L3-S1  +DP/PT Pulses  Compartments soft  No calf TTP B/L

## 2019-09-27 NOTE — PROGRESS NOTE ADULT - SUBJECTIVE AND OBJECTIVE BOX
CC: f/u for infected rt hip IM nail     Patient reports: no specific complaints, desire to go home    REVIEW OF SYSTEMS:  All other review of systems negative (Comprehensive ROS)    Antimicrobials Day #  :day 3  cefTRIAXone   IVPB 1000 milliGRAM(s) IV Intermittent every 24 hours  vancomycin  IVPB 1000 milliGRAM(s) IV Intermittent every 12 hours  vancomycin  IVPB        Other Medications Reviewed    T(F): 98.3 (19 @ 05:09), Max: 98.9 (19 @ 22:13)  HR: 77 (19 @ 05:09)  BP: 108/66 (19 @ 05:09)  RR: 18 (19 @ 05:09)  SpO2: 96% (19 @ 05:09)  Wt(kg): --    PHYSICAL EXAM:  General: alert, no acute distress  Eyes:  anicteric, no conjunctival injection, no discharge  Oropharynx: no lesions or injection 	  Neck: supple, without adenopathy  Lungs: clear to auscultation  Heart: regular rate and rhythm; no murmur, rubs or gallops  Abdomen: soft, nondistended, nontender, without mass or organomegaly  Skin: no lesions  Extremities: no clubbing, cyanosis, or edema  Neurologic: alert, oriented, moves all extremities  RT hip incision is dressed  LAB RESULTS:                        8.6    6.4   )-----------( 456      ( 26 Sep 2019 06:41 )             25.9         142  |  106  |  12  ----------------------------<  86  3.8   |  26  |  0.51    Ca    8.7      26 Sep 2019 06:40        Urinalysis Basic - ( 26 Sep 2019 11:35 )    Color: Yellow / Appearance: Turbid / S.014 / pH: x  Gluc: x / Ketone: Negative  / Bili: Negative / Urobili: Negative   Blood: x / Protein: 30 mg/dL / Nitrite: Positive   Leuk Esterase: Large / RBC: 5 /hpf / WBC >50   Sq Epi: x / Non Sq Epi: 1 / Bacteria: many      MICROBIOLOGY:  RECENT CULTURES:   @ 00:49 .Surgical Swab     Moderate Staphylococcus aureus       @ 21:55 .Blood     No growth to date.       @ 15:09 BLOOD PERIPHERAL         NO ORGANISMS ISOLATED  NO ORGANISMS ISOLATED AT 48 HRS.   @ 15:01 BLOOD VENOUS         NO ORGANISMS ISOLATED  NO ORGANISMS ISOLATED AT 48 HRS.      RADIOLOGY REVIEWED:    < from: CT Hip w/ IV Cont, Right (19 @ 16:58) >    IMPRESSION:    Status post right hip ORIF.    Thin/peripherally enhancing sinus tract that extends from just above the   level of the greater trochanter to the level of the skin. This may   reflect a postsurgical tract. However infected fluid cannot be entirely   excluded and sampling at the skinlevel, if able, is recommended.    Adjacent subcutaneous soft tissue swelling and infiltration that may   reflect postsurgical change or cellulitis.    < end of copied text >

## 2019-09-27 NOTE — PROGRESS NOTE ADULT - ASSESSMENT
Patient is a 64 yo woman presents with an infected right IMN.     ·  POST-OP DIAGNOSIS:  Surgical wound dehiscence 25-Sep-2019 15:15:21  Bharath Arndt.  ·  PROCEDURES:  Irrigation and debridement of right hip 25-Sep-2019 15:14:57  Bharath Arndt.       Operative Findings:  · Operative Findings	R IM nail surgical site infection with debridement of abscess and sinus tract.  Gross pus in soft tissue.  Copious irrigation, closure.

## 2019-09-27 NOTE — PROGRESS NOTE ADULT - ASSESSMENT
63y female with hx Multiple sclerosis, recent fall R hip repair with IM nail, now presented from rehab with redness, swelling and drainage from R hip.   Now s/p I &D of an abscess and sinus tract, gross pus in soft tissue, s/p copious irrigation and closure.   Pt denies any fevers or chills   Wound culture with staph aureus, sensitivities are pending  She has retained foreign body, hence cure may be difficult unless there are plans for eventual nail removal  PLAN:  cont empiric Vanco for now  if isolate is a MSSA will be able to use cefazolin  Duration of IV therapy and timing of conversion to oral route will require ortho input.  If her blood cultures remain negative we could consider oral regimen  Call plaved to review with Dr Carpenter 63y female with hx Multiple sclerosis, recent fall R hip repair with IM nail, now presented from rehab with redness, swelling and drainage from R hip.   Now s/p I &D of an abscess and sinus tract, gross pus in soft tissue, s/p copious irrigation and closure.   Pt denies any fevers or chills   Wound culture with staph aureus, sensitivities are pending  She has retained foreign body, hence cure may be difficult unless there are plans for eventual nail removal  PLAN:  cont empiric Vanco for now  ? IF CTX should be stopped.  if isolate is a MSSA will be able to use cefazolin  Duration of IV therapy and timing of conversion to oral route will require ortho input.  If her blood cultures remain negative we could consider oral regimen  Call placed to review with Dr Carpenter, he is presently away

## 2019-09-27 NOTE — PROGRESS NOTE ADULT - SUBJECTIVE AND OBJECTIVE BOX
The patient was seen and examined  with active swelling and drainage of the right hip that requires orthopedic ORIF for drainage, cultures and irrigation. Her comorbidities include multiple sclerosis with  slow progression over the past 30 years, muscle spasms, osteoporosis, malnutrion with weight loss and myopathy . Exam, lab, EKG and CXR are stable. Patient seen OOB to chair. states pain is well controlled  63F s/p R femur IMN I&D      MEDICATIONS  (STANDING):  buPROPion XL . 300 milliGRAM(s) Oral daily  cefTRIAXone   IVPB 1000 milliGRAM(s) IV Intermittent every 24 hours  citalopram 40 milliGRAM(s) Oral daily  docusate sodium 100 milliGRAM(s) Oral three times a day  enoxaparin Injectable 40 milliGRAM(s) SubCutaneous daily  senna 2 Tablet(s) Oral at bedtime  vancomycin  IVPB 750 milliGRAM(s) IV Intermittent every 12 hours    MEDICATIONS  (PRN):  acetaminophen   Tablet .. 975 milliGRAM(s) Oral every 8 hours PRN Temp greater or equal to 38C (100.4F), Mild Pain (1 - 3)  morphine  - Injectable 2 milliGRAM(s) IV Push every 4 hours PRN breakthrough pain  oxyCODONE    IR 2.5 milliGRAM(s) Oral every 4 hours PRN Moderate Pain (4 - 6)  oxyCODONE    IR 5 milliGRAM(s) Oral every 4 hours PRN Severe Pain (7 - 10)      Vital Signs Last 24 Hrs  T(C): 36.8 (27 Sep 2019 05:09), Max: 37.2 (26 Sep 2019 22:13)  T(F): 98.3 (27 Sep 2019 05:09), Max: 98.9 (26 Sep 2019 22:13)  HR: 77 (27 Sep 2019 05:09) (77 - 95)  BP: 108/- (27 Sep 2019 05:09) (95/65 - 114/67)  BP(mean): --  RR: 18 (27 Sep 2019 05:09) (18 - 18)  SpO2: 96% (27 Sep 2019 05:09) (95% - 97%)        PHYSICAL EXAM:  GENERAL: NAD, well nourished and conversant  HEAD:  Atraumatic  EYES: EOM, PERRLA, conjunctiva pink and sclera white  ENT: No tonsillar erythema, exudates, or enlargement, moist mucous membranes, good dentition, no lesions  NECK: Supple, No JVD, normal thyroid, carotids with normal upstrokes and no bruits  CHEST/LUNG: Clear to auscultation bilaterally, No rales, rhonchi, wheezing, or rubs  HEART: Regular rate and rhythm, No murmurs, rubs, or gallops  ABDOMEN: Soft, nondistended, no masses, guarding, tenderness or rebound, bowel sounds present  EXTREMITIES:  2+ Peripheral Pulses, No clubbing, cyanosis, or edema.  Right femur IMN and I&D  LYMPH: No lymphadenopathy noted  SKIN: No rashes or lesions  NERVOUS SYSTEM:  Alert & Oriented X3, normal cognitive function. Motor Strength 5/5 right upper and right lower.  5/5 left upper and left lower extremities, DTRs 2+ intact and symmetric      LABS:    No labs for     CBC Full  -  ( 26 Sep 2019 06:41 )  WBC Count : 6.4 K/uL  RBC Count : 2.76 M/uL  Hemoglobin : 8.6 g/dL  Hematocrit : 25.9 %  Platelet Count - Automated : 456 K/uL  Mean Cell Volume : 93.8 fl  Mean Cell Hemoglobin : 31.1 pg  Mean Cell Hemoglobin Concentration : 33.1 gm/dL  Auto Neutrophil # : x  Auto Lymphocyte # : x  Auto Monocyte # : x  Auto Eosinophil # : x  Auto Basophil # : x  Auto Neutrophil % : x  Auto Lymphocyte % : x  Auto Monocyte % : x  Auto Eosinophil % : x  Auto Basophil % : x        142  |  106  |  12  ----------------------------<  86  3.8   |  26  |  0.51    Ca    8.7      26 Sep 2019 06:40    TPro  x   /  Alb  3.6  /  TBili  x   /  DBili  x   /  AST  x   /  ALT  x   /  AlkPhos  x       LIVER FUNCTIONS - ( 25 Sep 2019 04:05 )  Alb: 3.6 g/dL / Pro: x     / ALK PHOS: x     / ALT: x     / AST: x     / GGT: x           PT/INR - ( 25 Sep 2019 04:04 )   PT: 12.3 sec;   INR: 1.07 ratio         PTT - ( 25 Sep 2019 04:04 )  PTT:31.0 sec  Urinalysis Basic - ( 26 Sep 2019 11:35 )    Color: Yellow / Appearance: Turbid / S.014 / pH: x  Gluc: x / Ketone: Negative  / Bili: Negative / Urobili: Negative   Blood: x / Protein: 30 mg/dL / Nitrite: Positive   Leuk Esterase: Large / RBC: 5 /hpf / WBC >50   Sq Epi: x / Non Sq Epi: 1 / Bacteria: many      CAPILLARY BLOOD GLUCOSE          RADIOLOGY & ADDITIONAL TESTS:

## 2019-09-28 LAB
-  AMIKACIN: SIGNIFICANT CHANGE UP
-  AMPICILLIN/SULBACTAM: SIGNIFICANT CHANGE UP
-  AMPICILLIN: SIGNIFICANT CHANGE UP
-  AZTREONAM: SIGNIFICANT CHANGE UP
-  CEFAZOLIN: SIGNIFICANT CHANGE UP
-  CEFEPIME: SIGNIFICANT CHANGE UP
-  CEFOXITIN: SIGNIFICANT CHANGE UP
-  CEFTRIAXONE: SIGNIFICANT CHANGE UP
-  CIPROFLOXACIN: SIGNIFICANT CHANGE UP
-  GENTAMICIN: SIGNIFICANT CHANGE UP
-  IMIPENEM: SIGNIFICANT CHANGE UP
-  LEVOFLOXACIN: SIGNIFICANT CHANGE UP
-  MEROPENEM: SIGNIFICANT CHANGE UP
-  NITROFURANTOIN: SIGNIFICANT CHANGE UP
-  PIPERACILLIN/TAZOBACTAM: SIGNIFICANT CHANGE UP
-  TIGECYCLINE: SIGNIFICANT CHANGE UP
-  TOBRAMYCIN: SIGNIFICANT CHANGE UP
-  TRIMETHOPRIM/SULFAMETHOXAZOLE: SIGNIFICANT CHANGE UP
CULTURE RESULTS: SIGNIFICANT CHANGE UP
METHOD TYPE: SIGNIFICANT CHANGE UP
ORGANISM # SPEC MICROSCOPIC CNT: SIGNIFICANT CHANGE UP
ORGANISM # SPEC MICROSCOPIC CNT: SIGNIFICANT CHANGE UP
SPECIMEN SOURCE: SIGNIFICANT CHANGE UP
VANCOMYCIN TROUGH SERPL-MCNC: 10.2 UG/ML — SIGNIFICANT CHANGE UP (ref 10–20)

## 2019-09-28 RX ADMIN — Medication 100 MILLIGRAM(S): at 05:15

## 2019-09-28 RX ADMIN — BUPROPION HYDROCHLORIDE 300 MILLIGRAM(S): 150 TABLET, EXTENDED RELEASE ORAL at 11:54

## 2019-09-28 RX ADMIN — ENOXAPARIN SODIUM 40 MILLIGRAM(S): 100 INJECTION SUBCUTANEOUS at 11:54

## 2019-09-28 RX ADMIN — Medication 250 MILLIGRAM(S): at 05:14

## 2019-09-28 RX ADMIN — Medication 100 MILLIGRAM(S): at 14:29

## 2019-09-28 RX ADMIN — CITALOPRAM 40 MILLIGRAM(S): 10 TABLET, FILM COATED ORAL at 11:54

## 2019-09-28 RX ADMIN — Medication 250 MILLIGRAM(S): at 18:47

## 2019-09-28 NOTE — PROVIDER CONTACT NOTE (CRITICAL VALUE NOTIFICATION) - TEST AND RESULT REPORTED:
Result for Surgical swab x 2 done on 9-25-19. 1st showed Numerous MRSA, 2nd one showed Moderate MRSA

## 2019-09-28 NOTE — PROGRESS NOTE ADULT - ASSESSMENT
63y female with hx Multiple sclerosis, recent fall R hip repair with IM nail, now presented from rehab with redness, swelling and drainage from R hip.   Now s/p I &D of an abscess and sinus tract, gross pus in soft tissue, s/p copious irrigation and closure.   Pt denies any fevers or chills   Wound culture with MRSA  She has retained foreign body, hence cure may be difficult unless there are plans for eventual nail removal  PLAN:  cont Vanco for now  d/c Ceftriaxone  Duration of IV therapy and timing of conversion to oral route will depend on ortho input, whether hardware is involved  PICC line and extended IV Vanco 4-6 weeks with conversion to oral minocycline in view of retained hardware is on of the option 63y female with hx Multiple sclerosis, recent fall R hip repair with IM nail, now presented from rehab with redness, swelling and drainage from R hip.   Now s/p I &D of an abscess and sinus tract, gross pus in soft tissue, s/p copious irrigation and closure.   Pt denies any fevers or chills   Wound culture with MRSA  She has retained foreign body, hence cure may be difficult unless there are plans for eventual nail removal  PLAN:  cont Vanco   d/c Ceftriaxone  Duration of IV therapy and timing of conversion to oral route will depend on ortho input, whether hardware is involved  PICC line and extended IV Vanco 4-6 weeks with conversion to oral minocycline in view of retained hardware is one of the option  monitor Vanco levels, routine labs

## 2019-09-28 NOTE — PROGRESS NOTE ADULT - SUBJECTIVE AND OBJECTIVE BOX
The patient was seen and examined  with active swelling and drainage of the right hip that requires orthopedic ORIF for drainage, cultures and irrigation. Her comorbidities include multiple sclerosis with  slow progression over the past 30 years, muscle spasms, osteoporosis, malnutrion with weight loss and myopathy . Exam, lab, EKG and CXR are stable. Patient seen OOB to chair. states pain is well controlled  63F s/p R femur IMN I&D      MEDICATIONS  (STANDING):  buPROPion XL . 300 milliGRAM(s) Oral daily  cefTRIAXone   IVPB 1000 milliGRAM(s) IV Intermittent every 24 hours  citalopram 40 milliGRAM(s) Oral daily  docusate sodium 100 milliGRAM(s) Oral three times a day  enoxaparin Injectable 40 milliGRAM(s) SubCutaneous daily  senna 2 Tablet(s) Oral at bedtime  vancomycin  IVPB 750 milliGRAM(s) IV Intermittent every 12 hours    MEDICATIONS  (PRN):  acetaminophen   Tablet .. 975 milliGRAM(s) Oral every 8 hours PRN Temp greater or equal to 38C (100.4F), Mild Pain (1 - 3)  morphine  - Injectable 2 milliGRAM(s) IV Push every 4 hours PRN breakthrough pain  oxyCODONE    IR 2.5 milliGRAM(s) Oral every 4 hours PRN Moderate Pain (4 - 6)  oxyCODONE    IR 5 milliGRAM(s) Oral every 4 hours PRN Severe Pain (7 - 10)      Vital Signs Last 24 Hrs  T(C): 36.8 (27 Sep 2019 05:09), Max: 37.2 (26 Sep 2019 22:13)  T(F): 98.3 (27 Sep 2019 05:09), Max: 98.9 (26 Sep 2019 22:13)  HR: 77 (27 Sep 2019 05:09) (77 - 95)  BP: 108/- (27 Sep 2019 05:09) (95/65 - 114/67)  BP(mean): --  RR: 18 (27 Sep 2019 05:09) (18 - 18)  SpO2: 96% (27 Sep 2019 05:09) (95% - 97%)        PHYSICAL EXAM:  GENERAL: NAD, well nourished and conversant  HEAD:  Atraumatic  EYES: EOM, PERRLA, conjunctiva pink and sclera white  ENT: No tonsillar erythema, exudates, or enlargement, moist mucous membranes, good dentition, no lesions  NECK: Supple, No JVD, normal thyroid, carotids with normal upstrokes and no bruits  CHEST/LUNG: Clear to auscultation bilaterally, No rales, rhonchi, wheezing, or rubs  HEART: Regular rate and rhythm, No murmurs, rubs, or gallops  ABDOMEN: Soft, nondistended, no masses, guarding, tenderness or rebound, bowel sounds present  EXTREMITIES:  2+ Peripheral Pulses, No clubbing, cyanosis, or edema.  Right femur IMN and I&D  LYMPH: No lymphadenopathy noted  SKIN: No rashes or lesions  NERVOUS SYSTEM:  Alert & Oriented X3, normal cognitive function. Motor Strength 5/5 right upper and right lower.  5/5 left upper and left lower extremities, DTRs 2+ intact and symmetric      LABS:    No labs for     CBC Full  -  ( 26 Sep 2019 06:41 )  WBC Count : 6.4 K/uL  RBC Count : 2.76 M/uL  Hemoglobin : 8.6 g/dL  Hematocrit : 25.9 %  Platelet Count - Automated : 456 K/uL  Mean Cell Volume : 93.8 fl  Mean Cell Hemoglobin : 31.1 pg  Mean Cell Hemoglobin Concentration : 33.1 gm/dL  Auto Neutrophil # : x  Auto Lymphocyte # : x  Auto Monocyte # : x  Auto Eosinophil # : x  Auto Basophil # : x  Auto Neutrophil % : x  Auto Lymphocyte % : x  Auto Monocyte % : x  Auto Eosinophil % : x  Auto Basophil % : x        142  |  106  |  12  ----------------------------<  86  3.8   |  26  |  0.51    Ca    8.7      26 Sep 2019 06:40    TPro  x   /  Alb  3.6  /  TBili  x   /  DBili  x   /  AST  x   /  ALT  x   /  AlkPhos  x       LIVER FUNCTIONS - ( 25 Sep 2019 04:05 )  Alb: 3.6 g/dL / Pro: x     / ALK PHOS: x     / ALT: x     / AST: x     / GGT: x           PT/INR - ( 25 Sep 2019 04:04 )   PT: 12.3 sec;   INR: 1.07 ratio         PTT - ( 25 Sep 2019 04:04 )  PTT:31.0 sec  Urinalysis Basic - ( 26 Sep 2019 11:35 )    Color: Yellow / Appearance: Turbid / S.014 / pH: x  Gluc: x / Ketone: Negative  / Bili: Negative / Urobili: Negative   Blood: x / Protein: 30 mg/dL / Nitrite: Positive   Leuk Esterase: Large / RBC: 5 /hpf / WBC >50   Sq Epi: x / Non Sq Epi: 1 / Bacteria: many      CAPILLARY BLOOD GLUCOSE          RADIOLOGY & ADDITIONAL TESTS: The patient was seen and examined  with active swelling and drainage of the right hip that requires orthopedic ORIF for drainage, cultures and irrigation. Her comorbidities include multiple sclerosis with  slow progression over the past 30 years, muscle spasms, osteoporosis, malnutrion with weight loss and myopathy . Exam, lab, EKG and CXR are stable. Patient seen OOB to chair. states pain is well controlled  63F s/p R femur IMN I&D      MEDICATIONS  (STANDING):  buPROPion XL . 300 milliGRAM(s) Oral daily  citalopram 40 milliGRAM(s) Oral daily  docusate sodium 100 milliGRAM(s) Oral three times a day  enoxaparin Injectable 40 milliGRAM(s) SubCutaneous daily  senna 2 Tablet(s) Oral at bedtime  vancomycin  IVPB 1000 milliGRAM(s) IV Intermittent every 12 hours  vancomycin  IVPB        MEDICATIONS  (PRN):  acetaminophen   Tablet .. 975 milliGRAM(s) Oral every 8 hours PRN Temp greater or equal to 38C (100.4F), Mild Pain (1 - 3)  oxyCODONE    IR 2.5 milliGRAM(s) Oral every 4 hours PRN Moderate Pain (4 - 6)  oxyCODONE    IR 5 milliGRAM(s) Oral every 4 hours PRN Severe Pain (7 - 10)    Vital Signs Last 24 Hrs  T(C): 36.7 (28 Sep 2019 21:21), Max: 36.9 (28 Sep 2019 12:19)  T(F): 98.1 (28 Sep 2019 21:21), Max: 98.5 (28 Sep 2019 12:19)  HR: 84 (28 Sep 2019 21:21) (79 - 89)  BP: 113/79 (28 Sep 2019 21:21) (111/70 - 127/82)  BP(mean): --  RR: 18 (28 Sep 2019 21:21) (18 - 18)  SpO2: 94% (28 Sep 2019 21:21) (94% - 97%)    PHYSICAL EXAM:  GENERAL: NAD, well nourished and conversant  HEAD:  Atraumatic  EYES: EOM, PERRLA, conjunctiva pink and sclera white  ENT: No tonsillar erythema, exudates, or enlargement, moist mucous membranes, good dentition, no lesions  NECK: Supple, No JVD, normal thyroid, carotids with normal upstrokes and no bruits  CHEST/LUNG: Clear to auscultation bilaterally, No rales, rhonchi, wheezing, or rubs  HEART: Regular rate and rhythm, No murmurs, rubs, or gallops  ABDOMEN: Soft, nondistended, no masses, guarding, tenderness or rebound, bowel sounds present  EXTREMITIES:  2+ Peripheral Pulses, No clubbing, cyanosis, or edema.  Right femur IMN and I&D  LYMPH: No lymphadenopathy noted  SKIN: No rashes or lesions  NERVOUS SYSTEM:  Alert & Oriented X3, normal cognitive function. Motor Strength 5/5 right upper and right lower.  5/5 left upper and left lower extremities, DTRs 2+ intact and symmetric      LABS:    No labs today The patient was seen and examined  with active swelling and drainage of the right hip that required orthopedic ORIF for drainage, cultures and irrigation performed on 09/25/19.  Her comorbidities include multiple sclerosis with  slow progression over the past 30 years, muscle spasms, osteoporosis, malnutrition  with weight loss and myopathy . Exam, lab, EKG and CXR are stable. Patient seen OOB to chair. States pain is well controlled  63F s/p R femur IMN I&D and now on only IV Vancomycin with cultures  + for MRSA. Will need 6 weeks of IV antibiotics via PICC line.      MEDICATIONS  (STANDING):  buPROPion XL . 300 milliGRAM(s) Oral daily  citalopram 40 milliGRAM(s) Oral daily  docusate sodium 100 milliGRAM(s) Oral three times a day  enoxaparin Injectable 40 milliGRAM(s) SubCutaneous daily  senna 2 Tablet(s) Oral at bedtime  vancomycin  IVPB 1000 milliGRAM(s) IV Intermittent every 12 hours  vancomycin  IVPB        MEDICATIONS  (PRN):  acetaminophen   Tablet .. 975 milliGRAM(s) Oral every 8 hours PRN Temp greater or equal to 38C (100.4F), Mild Pain (1 - 3)  oxyCODONE    IR 2.5 milliGRAM(s) Oral every 4 hours PRN Moderate Pain (4 - 6)  oxyCODONE    IR 5 milliGRAM(s) Oral every 4 hours PRN Severe Pain (7 - 10)    Vital Signs Last 24 Hrs  T(C): 36.7 (28 Sep 2019 21:21), Max: 36.9 (28 Sep 2019 12:19)  T(F): 98.1 (28 Sep 2019 21:21), Max: 98.5 (28 Sep 2019 12:19)  HR: 84 (28 Sep 2019 21:21) (79 - 89)  BP: 113/79 (28 Sep 2019 21:21) (111/70 - 127/82)  BP(mean): --  RR: 18 (28 Sep 2019 21:21) (18 - 18)  SpO2: 94% (28 Sep 2019 21:21) (94% - 97%)    PHYSICAL EXAM:  GENERAL: NAD, well nourished and conversant  HEAD:  Atraumatic  EYES: EOM, PERRLA, conjunctiva pink and sclera white  ENT: No tonsillar erythema, exudates, or enlargement, moist mucous membranes, good dentition, no lesions  NECK: Supple, No JVD, normal thyroid, carotids with normal upstrokes and no bruits  CHEST/LUNG: Clear to auscultation bilaterally, No rales, rhonchi, wheezing, or rubs  HEART: Regular rate and rhythm, No murmurs, rubs, or gallops  ABDOMEN: Soft, nondistended, no masses, guarding, tenderness or rebound, bowel sounds present  EXTREMITIES:  2+ Peripheral Pulses, No clubbing, cyanosis, or edema.  Right femur IMN and I&D  LYMPH: No lymphadenopathy noted  SKIN: No rashes or lesions  NERVOUS SYSTEM:  Alert & Oriented X3, normal cognitive function. Motor Strength 5/5 right upper and right lower.  5/5 left upper and left lower extremities, DTRs 2+ intact and symmetric      LABS:    No labs today

## 2019-09-28 NOTE — PROGRESS NOTE ADULT - ATTENDING COMMENTS
63F s/p R femur IMN I&D and now on only IV Vancomycin with cultures  + for MRSA. Will need 6 weeks of IV antibiotics via PICC line.

## 2019-09-28 NOTE — PROGRESS NOTE ADULT - SUBJECTIVE AND OBJECTIVE BOX
CC: f/u for infected rt hip IM nail     Patient reports: no specific complaints, no events overnight    REVIEW OF SYSTEMS:  All other review of systems negative (Comprehensive ROS)    Antimicrobials Day #  :day 4  cefTRIAXone   IVPB 1000 milliGRAM(s) IV Intermittent every 24 hours  vancomycin  IVPB 1000 milliGRAM(s) IV Intermittent every 12 hours  vancomycin  IVPB        Other Medications Reviewed    Vital Signs Last 24 Hrs  T(C): 36.7 (28 Sep 2019 06:01), Max: 37.1 (27 Sep 2019 21:18)  T(F): 98 (28 Sep 2019 06:01), Max: 98.7 (27 Sep 2019 21:18)  HR: 84 (28 Sep 2019 06:01) (84 - 96)  BP: 127/82 (28 Sep 2019 06:01) (105/69 - 127/82)  BP(mean): --  RR: 18 (28 Sep 2019 06:01) (17 - 18)  SpO2: 95% (28 Sep 2019 06:01) (94% - 97%)    PHYSICAL EXAM:  General: alert, no acute distress  Eyes:  anicteric, no conjunctival injection, no discharge  Oropharynx: no lesions or injection 	  Neck: supple, without adenopathy  Lungs: clear to auscultation  Heart: regular rate and rhythm; no murmur, rubs or gallops  Abdomen: soft, nondistended, nontender, without mass or organomegaly  Skin: no lesions  Extremities: no clubbing, cyanosis, or edema  Neurologic: alert, oriented, moves all extremities  RT hip incision is dressed  LAB RESULTS:                                   8.6    6.4   )-----------( 456      ( 26 Sep 2019 06:41 )             25.9   09-    142  |  106  |  12  ----------------------------<  86  3.8   |  26  |  0.51    Ca    8.7      26 Sep 2019 06:40            Urinalysis Basic - ( 26 Sep 2019 11:35 )    Color: Yellow / Appearance: Turbid / S.014 / pH: x  Gluc: x / Ketone: Negative  / Bili: Negative / Urobili: Negative   Blood: x / Protein: 30 mg/dL / Nitrite: Positive   Leuk Esterase: Large / RBC: 5 /hpf / WBC >50   Sq Epi: x / Non Sq Epi: 1 / Bacteria: many      MICROBIOLOGY:  RECENT CULTURES:  Specimen Source: .Surgical Swab (19 @ 00:49)    Culture - Surgical Swab (19 @ 00:49)    -  Ampicillin/Sulbactam: R <=8/4    -  Cefazolin: R <=4    -  Clindamycin: S 0.5    -  Daptomycin: S 1    -  Erythromycin: S <=0.25    -  Gentamicin: S <=1 Should not be used as monotherapy    -  Linezolid: S 4    -  Oxacillin: R >2    -  Penicillin: R 1    -  RIF- Rifampin: S <=1 Should not be used as monotherapy    -  Tetra/Doxy: S <=1    -  Trimethoprim/Sulfamethoxazole: S <=0.5/9.5    -  Vancomycin: S 2    Specimen Source: .Surgical Swab    Culture Results:   Moderate Methicillin resistant Staphylococcus aureus    Organism Identification: Methicillin resistant Staphylococcus aureus    Organism: Methicillin resistant Staphylococcus aureus    Method Type: GUIDO           @ 21:55 .Blood     No growth to date.       @ 15:09 BLOOD PERIPHERAL         NO ORGANISMS ISOLATED  NO ORGANISMS ISOLATED AT 48 HRS.   @ 15:01 BLOOD VENOUS         NO ORGANISMS ISOLATED  NO ORGANISMS ISOLATED AT 48 HRS.      RADIOLOGY REVIEWED:    < from: CT Hip w/ IV Cont, Right (19 @ 16:58) >    IMPRESSION:    Status post right hip ORIF.    Thin/peripherally enhancing sinus tract that extends from just above the   level of the greater trochanter to the level of the skin. This may   reflect a postsurgical tract. However infected fluid cannot be entirely   excluded and sampling at the skinlevel, if able, is recommended.    Adjacent subcutaneous soft tissue swelling and infiltration that may   reflect postsurgical change or cellulitis.    < end of copied text >

## 2019-09-29 LAB
ANION GAP SERPL CALC-SCNC: 8 MMOL/L — SIGNIFICANT CHANGE UP (ref 5–17)
BACTERIA BLD CULT: SIGNIFICANT CHANGE UP
BACTERIA BLD CULT: SIGNIFICANT CHANGE UP
BUN SERPL-MCNC: 8 MG/DL — SIGNIFICANT CHANGE UP (ref 7–23)
CALCIUM SERPL-MCNC: 9.1 MG/DL — SIGNIFICANT CHANGE UP (ref 8.4–10.5)
CHLORIDE SERPL-SCNC: 102 MMOL/L — SIGNIFICANT CHANGE UP (ref 96–108)
CO2 SERPL-SCNC: 32 MMOL/L — HIGH (ref 22–31)
CREAT SERPL-MCNC: 0.57 MG/DL — SIGNIFICANT CHANGE UP (ref 0.5–1.3)
CULTURE RESULTS: SIGNIFICANT CHANGE UP
CULTURE RESULTS: SIGNIFICANT CHANGE UP
GLUCOSE SERPL-MCNC: 104 MG/DL — HIGH (ref 70–99)
HCT VFR BLD CALC: 30.4 % — LOW (ref 34.5–45)
HGB BLD-MCNC: 9.7 G/DL — LOW (ref 11.5–15.5)
MCHC RBC-ENTMCNC: 30.5 PG — SIGNIFICANT CHANGE UP (ref 27–34)
MCHC RBC-ENTMCNC: 31.9 GM/DL — LOW (ref 32–36)
MCV RBC AUTO: 95.6 FL — SIGNIFICANT CHANGE UP (ref 80–100)
PLATELET # BLD AUTO: 463 K/UL — HIGH (ref 150–400)
POTASSIUM SERPL-MCNC: 4.4 MMOL/L — SIGNIFICANT CHANGE UP (ref 3.5–5.3)
POTASSIUM SERPL-SCNC: 4.4 MMOL/L — SIGNIFICANT CHANGE UP (ref 3.5–5.3)
RBC # BLD: 3.18 M/UL — LOW (ref 3.8–5.2)
RBC # FLD: 16 % — HIGH (ref 10.3–14.5)
SODIUM SERPL-SCNC: 142 MMOL/L — SIGNIFICANT CHANGE UP (ref 135–145)
SPECIMEN SOURCE: SIGNIFICANT CHANGE UP
SPECIMEN SOURCE: SIGNIFICANT CHANGE UP
WBC # BLD: 3.77 K/UL — LOW (ref 3.8–10.5)
WBC # FLD AUTO: 3.77 K/UL — LOW (ref 3.8–10.5)

## 2019-09-29 RX ORDER — HYDROCORTISONE 1 %
1 OINTMENT (GRAM) TOPICAL DAILY
Refills: 0 | Status: DISCONTINUED | OUTPATIENT
Start: 2019-09-29 | End: 2019-10-02

## 2019-09-29 RX ORDER — DIPHENHYDRAMINE HCL 50 MG
25 CAPSULE ORAL EVERY 6 HOURS
Refills: 0 | Status: DISCONTINUED | OUTPATIENT
Start: 2019-09-29 | End: 2019-10-02

## 2019-09-29 RX ADMIN — Medication 25 MILLIGRAM(S): at 02:43

## 2019-09-29 RX ADMIN — Medication 250 MILLIGRAM(S): at 18:11

## 2019-09-29 RX ADMIN — ENOXAPARIN SODIUM 40 MILLIGRAM(S): 100 INJECTION SUBCUTANEOUS at 11:35

## 2019-09-29 RX ADMIN — Medication 1 APPLICATION(S): at 11:35

## 2019-09-29 RX ADMIN — Medication 250 MILLIGRAM(S): at 06:36

## 2019-09-29 RX ADMIN — BUPROPION HYDROCHLORIDE 300 MILLIGRAM(S): 150 TABLET, EXTENDED RELEASE ORAL at 11:35

## 2019-09-29 RX ADMIN — CITALOPRAM 40 MILLIGRAM(S): 10 TABLET, FILM COATED ORAL at 11:35

## 2019-09-29 NOTE — PROGRESS NOTE ADULT - ATTENDING COMMENTS
63F s/p R femur IMN I&D and now on only IV Vancomycin with cultures  + for MRSA. Will need 6 weeks of IV antibiotics via PICC line. 63F s/p R femur IMN I&D and now on only IV Vancomycin with cultures  + for MRSA. Will need 6 weeks of IV antibiotics via PICC line. status post right hip ORIF and hardware remains in place, as the present infection was limited to cellulitis and soft tissue with no invasion into the right hip joint.

## 2019-09-29 NOTE — PROGRESS NOTE ADULT - SUBJECTIVE AND OBJECTIVE BOX
The patient was seen and examined  with active swelling and drainage of the right hip that required orthopedic ORIF for drainage, cultures and irrigation performed on 09/25/19.  Her comorbidities include multiple sclerosis with  slow progression over the past 30 years, muscle spasms, osteoporosis, malnutrition  with weight loss and myopathy . Exam, lab, EKG and CXR are stable. Patient seen OOB to chair. States pain is well controlled  63F s/p R femur IMN I&D and now on only IV Vancomycin with cultures  + for MRSA. Will need 6 weeks of IV antibiotics via PICC line.      MEDICATIONS  (STANDING):  buPROPion XL . 300 milliGRAM(s) Oral daily  citalopram 40 milliGRAM(s) Oral daily  docusate sodium 100 milliGRAM(s) Oral three times a day  enoxaparin Injectable 40 milliGRAM(s) SubCutaneous daily  senna 2 Tablet(s) Oral at bedtime  vancomycin  IVPB 1000 milliGRAM(s) IV Intermittent every 12 hours  vancomycin  IVPB        MEDICATIONS  (PRN):  acetaminophen   Tablet .. 975 milliGRAM(s) Oral every 8 hours PRN Temp greater or equal to 38C (100.4F), Mild Pain (1 - 3)  oxyCODONE    IR 2.5 milliGRAM(s) Oral every 4 hours PRN Moderate Pain (4 - 6)  oxyCODONE    IR 5 milliGRAM(s) Oral every 4 hours PRN Severe Pain (7 - 10)    Vital Signs Last 24 Hrs  T(C): 36.7 (28 Sep 2019 21:21), Max: 36.9 (28 Sep 2019 12:19)  T(F): 98.1 (28 Sep 2019 21:21), Max: 98.5 (28 Sep 2019 12:19)  HR: 84 (28 Sep 2019 21:21) (79 - 89)  BP: 113/79 (28 Sep 2019 21:21) (111/70 - 127/82)  BP(mean): --  RR: 18 (28 Sep 2019 21:21) (18 - 18)  SpO2: 94% (28 Sep 2019 21:21) (94% - 97%)    PHYSICAL EXAM:  GENERAL: NAD, well nourished and conversant  HEAD:  Atraumatic  EYES: EOM, PERRLA, conjunctiva pink and sclera white  ENT: No tonsillar erythema, exudates, or enlargement, moist mucous membranes, good dentition, no lesions  NECK: Supple, No JVD, normal thyroid, carotids with normal upstrokes and no bruits  CHEST/LUNG: Clear to auscultation bilaterally, No rales, rhonchi, wheezing, or rubs  HEART: Regular rate and rhythm, No murmurs, rubs, or gallops  ABDOMEN: Soft, nondistended, no masses, guarding, tenderness or rebound, bowel sounds present  EXTREMITIES:  2+ Peripheral Pulses, No clubbing, cyanosis, or edema.  Right femur IMN and I&D  LYMPH: No lymphadenopathy noted  SKIN: No rashes or lesions  NERVOUS SYSTEM:  Alert & Oriented X3, normal cognitive function. Motor Strength 5/5 right upper and right lower.  5/5 left upper and left lower extremities, DTRs 2+ intact and symmetric      LABS:    No labs today The patient was seen and examined  with active swelling and drainage of the right hip that required orthopedic ORIF for drainage, cultures and irrigation performed on 09/25/19.  Her comorbidities include multiple sclerosis with  slow progression over the past 30 years, muscle spasms, osteoporosis, malnutrition  with weight loss and myopathy . Exam, lab, EKG and CXR are stable. Patient seen OOB to chair. States pain is well controlled  63F s/p R femur IMN I&D and now on only IV Vancomycin with cultures  + for MRSA. Will need 6 weeks of IV antibiotics via PICC line.    MEDICATIONS  (STANDING):  buPROPion XL . 300 milliGRAM(s) Oral daily  citalopram 40 milliGRAM(s) Oral daily  docusate sodium 100 milliGRAM(s) Oral three times a day  enoxaparin Injectable 40 milliGRAM(s) SubCutaneous daily  hydrocortisone 1% Cream 1 Application(s) Topical daily  senna 2 Tablet(s) Oral at bedtime  vancomycin  IVPB 1000 milliGRAM(s) IV Intermittent every 12 hours  vancomycin  IVPB        MEDICATIONS  (PRN):  acetaminophen   Tablet .. 975 milliGRAM(s) Oral every 8 hours PRN Temp greater or equal to 38C (100.4F), Mild Pain (1 - 3)  diphenhydrAMINE   Injectable 25 milliGRAM(s) IntraMuscular every 6 hours PRN Rash and/or Itching  oxyCODONE    IR 2.5 milliGRAM(s) Oral every 4 hours PRN Moderate Pain (4 - 6)  oxyCODONE    IR 5 milliGRAM(s) Oral every 4 hours PRN Severe Pain (7 - 10)    Vital Signs Last 24 Hrs  T(C): 36.8 (29 Sep 2019 16:42), Max: 36.8 (29 Sep 2019 16:42)  T(F): 98.2 (29 Sep 2019 16:42), Max: 98.2 (29 Sep 2019 16:42)  HR: 96 (29 Sep 2019 16:42) (81 - 96)  BP: 126/74 (29 Sep 2019 16:42) (126/74 - 146/90)  BP(mean): --  RR: 18 (29 Sep 2019 16:42) (18 - 18)  SpO2: 98% (29 Sep 2019 16:42) (97% - 98%)    PHYSICAL EXAM:  GENERAL: NAD, well nourished and conversant  HEAD:  Atraumatic  EYES: EOM, PERRLA, conjunctiva pink and sclera white  ENT: No tonsillar erythema, exudates, or enlargement, moist mucous membranes, good dentition, no lesions  NECK: Supple, No JVD, normal thyroid, carotids with normal upstrokes and no bruits  CHEST/LUNG: Clear to auscultation bilaterally, No rales, rhonchi, wheezing, or rubs  HEART: Regular rate and rhythm, No murmurs, rubs, or gallops  ABDOMEN: Soft, nondistended, no masses, guarding, tenderness or rebound, bowel sounds present  EXTREMITIES:  2+ Peripheral Pulses, No clubbing, cyanosis, or edema.  Right femur IMN and I&D  LYMPH: No lymphadenopathy noted  SKIN: No rashes or lesions  NERVOUS SYSTEM:  Alert & Oriented X3, normal cognitive function. Motor Strength 5/5 right upper and right lower.  5/5 left upper and left lower extremities, DTRs 2+ intact and symmetric      LABS:    CBC Full  -  ( 29 Sep 2019 09:25 )  WBC Count : 3.77 K/uL  RBC Count : 3.18 M/uL  Hemoglobin : 9.7 g/dL  Hematocrit : 30.4 %  Platelet Count - Automated : 463 K/uL  Mean Cell Volume : 95.6 fl  Mean Cell Hemoglobin : 30.5 pg  Mean Cell Hemoglobin Concentration : 31.9 gm/dL  Auto Neutrophil # : x  Auto Lymphocyte # : x  Auto Monocyte # : x  Auto Eosinophil # : x  Auto Basophil # : x  Auto Neutrophil % : x  Auto Lymphocyte % : x  Auto Monocyte % : x  Auto Eosinophil % : x  Auto Basophil % : x    09-29    142  |  102  |  8   ----------------------------<  104<H>  4.4   |  32<H>  |  0.57    Ca    9.1      29 Sep 2019 07:28

## 2019-09-29 NOTE — PROGRESS NOTE ADULT - ASSESSMENT
63y female with hx Multiple sclerosis, recent fall R hip repair with IM nail, now presented from rehab with redness, swelling and drainage from R hip.   Now s/p I &D of an abscess and sinus tract, gross pus in soft tissue, s/p copious irrigation and closure.   Pt denies any fevers or chills   Wound culture with MRSA  She has retained foreign body, hence cure may be difficult unless there are plans for eventual nail removal  PLAN:  cont Vanco   will need PICC line and extended IV Vanco 4-6 weeks with conversion to oral minocycline in view of retained hardware is one of the option  monitor Vanco levels, routine labs

## 2019-09-29 NOTE — PROGRESS NOTE ADULT - SUBJECTIVE AND OBJECTIVE BOX
Patient resting without complaints.  No chest pain, SOB, N/V.    T(C): 36.6 (09-29-19 @ 06:32), Max: 36.9 (09-28-19 @ 12:19)  HR: 81 (09-29-19 @ 06:32) (79 - 84)  BP: 135/78 (09-29-19 @ 06:32) (113/79 - 144/86)  RR: 18 (09-29-19 @ 06:32) (18 - 18)  SpO2: 98% (09-29-19 @ 06:32) (94% - 98%)      Exam:  Alert and Oriented, No Acute Distress  Lower Extremities:  RLE: Hip Dressing C/D/I, mild edema, mild incisional tenderness, dull sensation  grossly intact, DP2+, +DF, +PF  Calves Soft, Non-tender bilaterally

## 2019-09-29 NOTE — PROGRESS NOTE ADULT - ASSESSMENT
A/P: 62 y/o F POD#4 s/p R Infected IM Nail I&D      DVT ppx- Lovenox  RLE WBAT  PT  Appreciate ID Note  IV ABx- Vancomycin as per ID  Contact Isolation for MRSA  GI ppx  Pain management prn  F/U Final ID recs- PICC line??      ZITA Reynoso  Orthopedic Surgery  [phone]

## 2019-09-29 NOTE — PROGRESS NOTE ADULT - SUBJECTIVE AND OBJECTIVE BOX
CC: f/u for infected rt hip IM nail     Patient reports: no specific complaints, no events overnight    REVIEW OF SYSTEMS:  All other review of systems negative (Comprehensive ROS)    Antimicrobials Day #  :day 5    vancomycin  IVPB 1000 milliGRAM(s) IV Intermittent every 12 hours  vancomycin  IVPB          Other Medications Reviewed    Vital Signs Last 24 Hrs  T(C): 36.6 (29 Sep 2019 06:32), Max: 36.9 (28 Sep 2019 12:19)  T(F): 97.9 (29 Sep 2019 06:32), Max: 98.5 (28 Sep 2019 12:19)  HR: 81 (29 Sep 2019 06:32) (79 - 84)  BP: 135/78 (29 Sep 2019 06:32) (113/79 - 144/86)  BP(mean): --  RR: 18 (29 Sep 2019 06:32) (18 - 18)  SpO2: 98% (29 Sep 2019 06:32) (94% - 98%)    PHYSICAL EXAM:  General: alert, no acute distress  Eyes:  anicteric, no conjunctival injection, no discharge  Oropharynx: no lesions or injection 	  Neck: supple, without adenopathy  Lungs: clear to auscultation  Heart: regular rate and rhythm; no murmur, rubs or gallops  Abdomen: soft, nondistended, nontender, without mass or organomegaly  Skin: no lesions  Extremities: no clubbing, cyanosis, or edema  Neurologic: alert, oriented, moves all extremities  RT hip incision is dressed  LAB RESULTS:                 142  |  102  |  8   ----------------------------<  104<H>  4.4   |  32<H>  |  0.57    Ca    9.1      29 Sep 2019 07:28                Urinalysis Basic - ( 26 Sep 2019 11:35 )    Color: Yellow / Appearance: Turbid / S.014 / pH: x  Gluc: x / Ketone: Negative  / Bili: Negative / Urobili: Negative   Blood: x / Protein: 30 mg/dL / Nitrite: Positive   Leuk Esterase: Large / RBC: 5 /hpf / WBC >50   Sq Epi: x / Non Sq Epi: 1 / Bacteria: many      MICROBIOLOGY:  RECENT CULTURES:  Specimen Source: .Surgical Swab (19 @ 00:49)    Culture - Surgical Swab (09.26.19 @ 00:49)    -  Ampicillin/Sulbactam: R <=8/4    -  Cefazolin: R <=4    -  Clindamycin: S 0.5    -  Daptomycin: S 1    -  Erythromycin: S <=0.25    -  Gentamicin: S <=1 Should not be used as monotherapy    -  Linezolid: S 4    -  Oxacillin: R >2    -  Penicillin: R 1    -  RIF- Rifampin: S <=1 Should not be used as monotherapy    -  Tetra/Doxy: S <=1    -  Trimethoprim/Sulfamethoxazole: S <=0.5/9.5    -  Vancomycin: S 2    Specimen Source: .Surgical Swab    Culture Results:   Moderate Methicillin resistant Staphylococcus aureus    Organism Identification: Methicillin resistant Staphylococcus aureus    Organism: Methicillin resistant Staphylococcus aureus    Method Type: GUIDO           @ 21:55 .Blood     No growth to date.       @ 15:09 BLOOD PERIPHERAL         NO ORGANISMS ISOLATED  NO ORGANISMS ISOLATED AT 48 HRS.   @ 15:01 BLOOD VENOUS         NO ORGANISMS ISOLATED  NO ORGANISMS ISOLATED AT 48 HRS.      RADIOLOGY REVIEWED:    < from: CT Hip w/ IV Cont, Right (19 @ 16:58) >    IMPRESSION:    Status post right hip ORIF.    Thin/peripherally enhancing sinus tract that extends from just above the   level of the greater trochanter to the level of the skin. This may   reflect a postsurgical tract. However infected fluid cannot be entirely   excluded and sampling at the skinlevel, if able, is recommended.    Adjacent subcutaneous soft tissue swelling and infiltration that may   reflect postsurgical change or cellulitis.    < end of copied text >

## 2019-09-30 LAB
CULTURE RESULTS: SIGNIFICANT CHANGE UP
CULTURE RESULTS: SIGNIFICANT CHANGE UP
ORGANISM # SPEC MICROSCOPIC CNT: SIGNIFICANT CHANGE UP
SPECIMEN SOURCE: SIGNIFICANT CHANGE UP
SPECIMEN SOURCE: SIGNIFICANT CHANGE UP
VANCOMYCIN TROUGH SERPL-MCNC: 14.9 UG/ML — SIGNIFICANT CHANGE UP (ref 10–20)

## 2019-09-30 PROCEDURE — 71045 X-RAY EXAM CHEST 1 VIEW: CPT | Mod: 26

## 2019-09-30 RX ORDER — CHLORHEXIDINE GLUCONATE 213 G/1000ML
1 SOLUTION TOPICAL
Refills: 0 | Status: DISCONTINUED | OUTPATIENT
Start: 2019-09-30 | End: 2019-10-02

## 2019-09-30 RX ORDER — SODIUM CHLORIDE 9 MG/ML
10 INJECTION INTRAMUSCULAR; INTRAVENOUS; SUBCUTANEOUS
Refills: 0 | Status: DISCONTINUED | OUTPATIENT
Start: 2019-09-30 | End: 2019-10-02

## 2019-09-30 RX ORDER — ALPRAZOLAM 0.25 MG
0.5 TABLET ORAL DAILY
Refills: 0 | Status: DISCONTINUED | OUTPATIENT
Start: 2019-09-30 | End: 2019-10-02

## 2019-09-30 RX ADMIN — Medication 100 MILLIGRAM(S): at 21:10

## 2019-09-30 RX ADMIN — ENOXAPARIN SODIUM 40 MILLIGRAM(S): 100 INJECTION SUBCUTANEOUS at 12:31

## 2019-09-30 RX ADMIN — SENNA PLUS 2 TABLET(S): 8.6 TABLET ORAL at 21:10

## 2019-09-30 RX ADMIN — Medication 1 APPLICATION(S): at 12:30

## 2019-09-30 RX ADMIN — CITALOPRAM 40 MILLIGRAM(S): 10 TABLET, FILM COATED ORAL at 12:31

## 2019-09-30 RX ADMIN — Medication 0.5 MILLIGRAM(S): at 16:31

## 2019-09-30 RX ADMIN — Medication 250 MILLIGRAM(S): at 18:40

## 2019-09-30 RX ADMIN — BUPROPION HYDROCHLORIDE 300 MILLIGRAM(S): 150 TABLET, EXTENDED RELEASE ORAL at 12:31

## 2019-09-30 RX ADMIN — Medication 250 MILLIGRAM(S): at 06:04

## 2019-09-30 NOTE — PROGRESS NOTE ADULT - ATTENDING COMMENTS
63F s/p R femur IMN I&D and now on only IV Vancomycin with cultures  + for MRSA. Will need 6 weeks of IV antibiotics via PICC line. status post right hip ORIF and hardware remains in place, as the present infection was limited to cellulitis and soft tissue with no invasion into the right hip joint. I am a non participating [employer] physician seeing Pt in coverage for Dr Wagner 63F s/p R femur IMN I&D and now on only IV Vancomycin with cultures  + for MRSA. Will need 6 weeks of IV antibiotics via PICC line. status post right hip ORIF and hardware remains in place, as the present infection was limited to cellulitis and soft tissue with no invasion into the right hip joint. I am a non participating [facility] physician seeing Pt in coverage for Dr Wagner. The above patient examination was reviewed with Dr. Troy and I agree with his evaluation, assessment and treatment plan.  Alexander Wagner M.D.

## 2019-09-30 NOTE — PROGRESS NOTE ADULT - SUBJECTIVE AND OBJECTIVE BOX
63F was seen and examined  with active swelling and drainage of the right hip that required orthopedic ORIF for drainage, cultures and irrigation performed on 09/25/19.  Her comorbidities include multiple sclerosis with  slow progression over the past 30 years, muscle spasms, osteoporosis, malnutrition  with weight loss and myopathy . Exam, lab, EKG and CXR are stable. Patient seen OOB to chair. States pain is well controlled s/p R femur IMN I&D and now on only IV Vancomycin with cultures  + for MRSA. Will need 6 weeks of IV antibiotics via PICC line. PICC line was placed without complication.          MEDICATIONS  (STANDING):  buPROPion XL . 300 milliGRAM(s) Oral daily  chlorhexidine 4% Liquid 1 Application(s) Topical <User Schedule>  citalopram 40 milliGRAM(s) Oral daily  docusate sodium 100 milliGRAM(s) Oral three times a day  enoxaparin Injectable 40 milliGRAM(s) SubCutaneous daily  hydrocortisone 1% Cream 1 Application(s) Topical daily  senna 2 Tablet(s) Oral at bedtime  vancomycin  IVPB 1000 milliGRAM(s) IV Intermittent every 12 hours  vancomycin  IVPB        MEDICATIONS  (PRN):  acetaminophen   Tablet .. 975 milliGRAM(s) Oral every 8 hours PRN Temp greater or equal to 38C (100.4F), Mild Pain (1 - 3)  ALPRAZolam 0.5 milliGRAM(s) Oral daily PRN Anxiety  diphenhydrAMINE   Injectable 25 milliGRAM(s) IntraMuscular every 6 hours PRN Rash and/or Itching  oxyCODONE    IR 2.5 milliGRAM(s) Oral every 4 hours PRN Moderate Pain (4 - 6)  oxyCODONE    IR 5 milliGRAM(s) Oral every 4 hours PRN Severe Pain (7 - 10)  sodium chloride 0.9% lock flush 10 milliLiter(s) IV Push every 1 hour PRN Pre/post blood products, medications, blood draw, and to maintain line patency          VITALS:   T(C): 36.7 (09-30-19 @ 21:11), Max: 36.8 (09-30-19 @ 08:48)  HR: 99 (09-30-19 @ 21:11) (71 - 99)  BP: 124/82 (09-30-19 @ 21:11) (111/80 - 147/85)  RR: 16 (09-30-19 @ 21:11) (16 - 18)  SpO2: 98% (09-30-19 @ 21:11) (97% - 99%)  Wt(kg): --        LABS:        CBC Full  -  ( 29 Sep 2019 09:25 )  WBC Count : 3.77 K/uL  RBC Count : 3.18 M/uL  Hemoglobin : 9.7 g/dL  Hematocrit : 30.4 %  Platelet Count - Automated : 463 K/uL  Mean Cell Volume : 95.6 fl  Mean Cell Hemoglobin : 30.5 pg  Mean Cell Hemoglobin Concentration : 31.9 gm/dL  Auto Neutrophil # : x  Auto Lymphocyte # : x  Auto Monocyte # : x  Auto Eosinophil # : x  Auto Basophil # : x  Auto Neutrophil % : x  Auto Lymphocyte % : x  Auto Monocyte % : x  Auto Eosinophil % : x  Auto Basophil % : x    09-29    142  |  102  |  8   ----------------------------<  104<H>  4.4   |  32<H>  |  0.57    Ca    9.1      29 Sep 2019 07:28            CAPILLARY BLOOD GLUCOSE          RADIOLOGY & ADDITIONAL TESTS:

## 2019-09-30 NOTE — PROGRESS NOTE ADULT - SUBJECTIVE AND OBJECTIVE BOX
ORTHO  Patient is a 63y old  Female who presents with a chief complaint of infected rt hip incision (27 Sep 2019 09:47)    Pt. resting without complaint    VS-  T(C): 36.6 (09-30-19 @ 05:57), Max: 36.8 (09-29-19 @ 16:42)  HR: 79 (09-30-19 @ 05:57) (79 - 96)  BP: 147/85 (09-30-19 @ 05:57) (126/74 - 147/85)  RR: 18 (09-30-19 @ 05:57) (18 - 18)  SpO2: 99% (09-30-19 @ 05:57) (98% - 99%)  Wt(kg): --    M.S. A&O  Extremity- Right hip dressing C/D/I, d/c'd, wound- C/D/I  Neuro-              Motor- (+) Ankle DF/PF              Sensation- grossly intact to light touch              Calves- soft, nontender                             9.7    3.77  )-----------( 463      ( 29 Sep 2019 09:25 )             30.4     09-29    142  |  102  |  8   ----------------------------<  104<H>  4.4   |  32<H>  |  0.57    Ca    9.1      29 Sep 2019 07:28

## 2019-09-30 NOTE — PROGRESS NOTE ADULT - ASSESSMENT
Impression: Stable       Plan:   Continue present treatment                 Out of bed, ambulate, weight bearing as tolerated                  Physical therapy follow up                  Continue to monitor    Kahlil Weston PA-C  Orthopaedic Surgery  Team pager [phone]  [phone]

## 2019-09-30 NOTE — PROGRESS NOTE ADULT - SUBJECTIVE AND OBJECTIVE BOX
CC: f/u for    Patient reports    REVIEW OF SYSTEMS:  All other review of systems negative (Comprehensive ROS)    Antimicrobials Day #  :  vancomycin  IVPB 1000 milliGRAM(s) IV Intermittent every 12 hours  vancomycin  IVPB        Other Medications Reviewed    T(F): 97.9 (09-30-19 @ 16:10), Max: 98.2 (09-29-19 @ 16:42)  HR: 90 (09-30-19 @ 16:10)  BP: 113/76 (09-30-19 @ 16:10)  RR: 16 (09-30-19 @ 16:10)  SpO2: 97% (09-30-19 @ 16:10)  Wt(kg): --    PHYSICAL EXAM:  General: alert, no acute distress  Eyes:  anicteric, no conjunctival injection, no discharge  Oropharynx: no lesions or injection 	  Neck: supple, without adenopathy  Lungs: clear to auscultation  Heart: regular rate and rhythm; no murmur, rubs or gallops  Abdomen: soft, nondistended, nontender, without mass or organomegaly  Skin: no lesions  Extremities: right hip wound with xavier incisional red but no drainage of tender  Neurologic: alert, oriented, moves all extremities    LAB RESULTS:                        9.7    3.77  )-----------( 463      ( 29 Sep 2019 09:25 )             30.4     09-29    142  |  102  |  8   ----------------------------<  104<H>  4.4   |  32<H>  |  0.57    Ca    9.1      29 Sep 2019 07:28          MICROBIOLOGY:  RECENT CULTURES:  09-26 @ 15:03 .Urine Escherichia coli    >100,000 CFU/ml Escherichia coli      09-26 @ 00:49 .Surgical Swab Methicillin resistant Staphylococcus aureus    Numerous Methicillin resistant Staphylococcus aureus          RADIOLOGY REVIEWED:        < from: CT Hip w/ IV Cont, Right (09.24.19 @ 16:58) >  Status post right hip ORIF.    Thin/peripherally enhancing sinus tract that extends from just above the   level of the greater trochanter to the level of the skin. This may   reflect a postsurgical tract. However infected fluid cannot be entirely   excluded and sampling at the skinlevel, if able, is recommended.    Adjacent subcutaneous soft tissue swelling and infiltration that may   reflect postsurgical change or cellulitis.    < end of copied text >        Assessment:  Patient s/p right hip fx orif about 3 weeks ago returns with sinus tract and abscess of wound s/p washout, hardware remains.   Plan:  6 weeks of iv vanco then po minocycline indefinitely since hardware remains

## 2019-10-01 ENCOUNTER — TRANSCRIPTION ENCOUNTER (OUTPATIENT)
Age: 64
End: 2019-10-01

## 2019-10-01 RX ORDER — ACETAMINOPHEN 500 MG
3 TABLET ORAL
Qty: 0 | Refills: 0 | DISCHARGE
Start: 2019-10-01

## 2019-10-01 RX ORDER — VANCOMYCIN HCL 1 G
1 VIAL (EA) INTRAVENOUS
Qty: 74 | Refills: 0
Start: 2019-10-01 | End: 2019-11-06

## 2019-10-01 RX ORDER — HYDROCORTISONE 1 %
1 OINTMENT (GRAM) TOPICAL
Qty: 0 | Refills: 0 | DISCHARGE
Start: 2019-10-01

## 2019-10-01 RX ORDER — METHENAMINE MANDELATE 1 G
1 TABLET ORAL
Qty: 0 | Refills: 0 | DISCHARGE

## 2019-10-01 RX ADMIN — Medication 0.5 MILLIGRAM(S): at 13:03

## 2019-10-01 RX ADMIN — Medication 100 MILLIGRAM(S): at 21:13

## 2019-10-01 RX ADMIN — SENNA PLUS 2 TABLET(S): 8.6 TABLET ORAL at 21:13

## 2019-10-01 RX ADMIN — CITALOPRAM 40 MILLIGRAM(S): 10 TABLET, FILM COATED ORAL at 12:58

## 2019-10-01 RX ADMIN — CHLORHEXIDINE GLUCONATE 1 APPLICATION(S): 213 SOLUTION TOPICAL at 06:53

## 2019-10-01 RX ADMIN — Medication 1 APPLICATION(S): at 12:58

## 2019-10-01 RX ADMIN — Medication 250 MILLIGRAM(S): at 17:27

## 2019-10-01 RX ADMIN — BUPROPION HYDROCHLORIDE 300 MILLIGRAM(S): 150 TABLET, EXTENDED RELEASE ORAL at 12:58

## 2019-10-01 RX ADMIN — Medication 100 MILLIGRAM(S): at 13:00

## 2019-10-01 RX ADMIN — ENOXAPARIN SODIUM 40 MILLIGRAM(S): 100 INJECTION SUBCUTANEOUS at 12:58

## 2019-10-01 RX ADMIN — Medication 250 MILLIGRAM(S): at 05:46

## 2019-10-01 NOTE — DISCHARGE NOTE PROVIDER - CARE PROVIDERS DIRECT ADDRESSES
[email or web address]

## 2019-10-01 NOTE — PROGRESS NOTE ADULT - SUBJECTIVE AND OBJECTIVE BOX
ORTHO  Patient is a 63y old  Female who presents with a chief complaint of hip wound infection (30 Sep 2019 16:26)    Pt. resting without complaint    VS-  T(C): 36.5 (10-01-19 @ 05:07), Max: 36.8 (09-30-19 @ 08:48)  HR: 76 (10-01-19 @ 05:07) (71 - 99)  BP: 123/75 (10-01-19 @ 05:07) (111/80 - 133/81)  RR: 16 (10-01-19 @ 05:07) (16 - 16)  SpO2: 100% (10-01-19 @ 05:07) (97% - 100%)  Wt(kg): --    M.S. A&O  Extremity- Right hip wound- C/D/I  Neuro-              Motor- (+) Ankle DF/PF              Sensation- grossly intact to light touch              Calves- soft, nontender                               9.7    3.77  )-----------( 463      ( 29 Sep 2019 09:25 )             30.4     09-29    142  |  102  |  8   ----------------------------<  104<H>  4.4   |  32<H>  |  0.57    Ca    9.1      29 Sep 2019 07:28

## 2019-10-01 NOTE — DISCHARGE NOTE PROVIDER - NSDCCPCAREPLAN_GEN_ALL_CORE_FT
PRINCIPAL DISCHARGE DIAGNOSIS  Diagnosis: Surgical site infection  Assessment and Plan of Treatment:

## 2019-10-01 NOTE — DISCHARGE NOTE PROVIDER - HOSPITAL COURSE
This is a 63 year old female with PMHx of R hip IM nail on 9/7/19, MS with Baclofen pump, and depression admitted to [facility] on 9/24/19.  Patient evaluated and cleared by Medicine for operative procedure.  On 9/25, patient underwent an uncomplicated washout of R hip site.  Cultures were collected intraop which grew e. coli.  Infectious Disease was consulted for management.  Antibiotic regimen was concluded to be 6 weeks of IV Vanco and oral Minocycline chronically after for suppression.  Evaluated and treated by PT, recommended for Subacute Rehab.  Patient refused and requested home PT, IV infusion with private hire aides.  Remain of hospital stay unremarkable, and patient discharged to home when services arranged.

## 2019-10-01 NOTE — PROGRESS NOTE ADULT - SUBJECTIVE AND OBJECTIVE BOX
CC: f/u for infected right hip with nail    Patient reports feels ok still pain in hip if walks    REVIEW OF SYSTEMS:  All other review of systems negative (Comprehensive ROS)    Antimicrobials Day #  :6/42  vancomycin  IVPB 1000 milliGRAM(s) IV Intermittent every 12 hours  vancomycin  IVPB        Other Medications Reviewed    T(F): 98.6 (10-01-19 @ 15:36), Max: 98.6 (10-01-19 @ 15:36)  HR: 98 (10-01-19 @ 15:36)  BP: 103/65 (10-01-19 @ 15:36)  RR: 16 (10-01-19 @ 15:36)  SpO2: 98% (10-01-19 @ 15:36)  Wt(kg): --    PHYSICAL EXAM:  General: alert, no acute distress  Eyes:  anicteric, no conjunctival injection, no discharge  Oropharynx: no lesions or injection 	  Neck: supple, without adenopathy  Lungs: clear to auscultation  Heart: regular rate and rhythm; no murmur, rubs or gallops  Abdomen: soft, nondistended, nontender, without mass or organomegaly  Skin: no lesions  Extremities: no clubbing, cyanosis,. right hip area is faintly red with edema, wound intact  Neurologic: alert, oriented, moves all extremities    LAB RESULTS:    Complete Blood Count in AM (09.29.19 @ 09:25)    WBC Count: 3.77 K/uL    RBC Count: 3.18 M/uL    Hemoglobin: 9.7 g/dL    Hematocrit: 30.4 %    Mean Cell Volume: 95.6 fl    Mean Cell Hemoglobin: 30.5 pg    Mean Cell Hemoglobin Conc: 31.9 gm/dL    Red Cell Distrib Width: 16.0 %    Platelet Count - Automated: 463 K/uL      Vancomycin Level, Trough - Prior to Next Dose (09.30.19 @ 17:16)    Vancomycin Level, Trough: 14.9: Vancomycin trough levels should be rapidly reached and maintained at  15-20 ug/ml for life threatening MRSA  infections such as sepsis, endocarditis, osteomyelitis and pneumonia. A  first trough level should be drawn  before the 3rd or 4th dose.  Risk of renal toxicity is increased for levels >15 ug/ml, in patients on  other nephrotoxic drugs, who are  hemodynamically unstable, have unstable renal function, or are on  Vancomycin therapy for >14 days. Renal function with  creatinine levels should be monitored for those patients. ug/mL  Culture - Surgical Swab (09.26.19 @ 00:49)    -  Ampicillin/Sulbactam: R <=8/4    -  Tetra/Doxy: S <=1    -  Trimethoprim/Sulfamethoxazole: S <=0.5/9.5    -  Vancomycin: S 2    -  RIF- Rifampin: S <=1 Should not be used as monotherapy    -  Linezolid: S 4    -  Penicillin: R 1    -  Oxacillin: R >2    -  Clindamycin: S 0.5    -  Daptomycin: S 1    -  Erythromycin: S <=0.25    -  Cefazolin: R <=4    -  Gentamicin: S <=1 Should not be used as monotherapy    Specimen Source: .Surgical Swab    Culture Results:   Moderate Methicillin resistant Staphylococcus aureus    Organism Identification: Methicillin resistant Staphylococcus aureus    Organism: Methicillin resistant Staphylococcus aureus    Method Type: GUIDO        RADIOLOGY REVIEWED:  < from: CT Hip w/ IV Cont, Right (09.24.19 @ 16:58) >    EXAM:  CT HIP ONLY IC RT        PROCEDURE DATE:  Sep 24 2019         INTERPRETATION:    CT OF THE RIGHT HIP WITH CONTRAST     CLINICAL INFORMATION: Recent right hip ORIF with redness and swelling.   Evaluate for infection.  TECHNIQUE: Axial CT images were obtained of the right hip with coronal   and sagittal reconstructions. 90 cc of Omnipaque 350 was administered   intravenously.    FINDINGS:    The patient is status post ORIF of the right femur with an intramedullary   nail and femoral neck screw with a distal interlocking screw for fixation   of an intertrochanteric fracture. There is near-anatomic alignment. There   is persistent avulsion of the lesser trochanter with comminution of the   greater trochanter.    There is a small hip joint effusion.    There is a thin peripherally enhancing sinus tract that tracks from the   level of the hip joint just above the level of the greater trochanter   superiorly and laterally to the skin just below level of the iliac wing.   This measures approximately 7 mm in width. Sampling of this fluid at the   skin level if able is recommended.. There is no discrete encapsulated   abscess. There is however prominent soft tissue swelling and infiltration   surrounding this region in the subcutaneousfat as well as extending more   inferiorly to the level of the mid shaft of the femur. The majority of   the soft tissue swelling is likely postsurgical in nature but may reflect   cellulitis as well.    IMPRESSION:    Status post right hip ORIF.    Thin/peripherally enhancing sinus tract that extends from just above the   level of the greater trochanter to the level of the skin. This may   reflect a postsurgical tract. However infected fluid cannot be entirely   excluded and sampling at the skinlevel, if able, is recommended.    Adjacent subcutaneous soft tissue swelling and infiltration that may   reflect postsurgical change or cellulitis.      Assessment:  Patient s/p orif right hip fx returns with sinus and abscess, s/p washout, hardware remains. Grew mrsa  Plan: 6 weeks iv vancomycin then suppressive minocycline but must monitor hip area

## 2019-10-01 NOTE — DISCHARGE NOTE PROVIDER - NSDCFUADDINST_GEN_ALL_CORE_FT
Please follow up with Dr. Carpenter 7-10 days after your discharge from the hospital (call for appointment).  PT-weight bearing as tolerated with.  Aspirin 325 twice daily x 6 weeks total for dvt prevention.  Keep dressing clean and intact, have doctor remove sutures post op day 14, and apply steristrips.  Please follow up with your PMD within 1 month for routine checkup.  Please follow up with Infectious Disease.  Weekly bloodwork must be collected while on IV antibiotics, please have results faxed to the Infectious Disease doctors.

## 2019-10-01 NOTE — DISCHARGE NOTE PROVIDER - NSDCACTIVITY_GEN_ALL_CORE
Walking - Indoors allowed/No heavy lifting/straining/Do not make important decisions/Stairs allowed/Walking - Outdoors allowed/Do not drive or operate machinery/Showering allowed Unknown

## 2019-10-01 NOTE — DISCHARGE NOTE PROVIDER - CARE PROVIDER_API CALL
Corinne Carpenter)  Orthopaedic Surgery  [address], [address]  [place] [address]  Phone: [phone]  Fax: [phone]  Follow Up Time:     Sushil Clifford)  Internal Medicine  [address], [address]  [place] [address]  Phone: [phone]  Fax: [phone]  Follow Up Time:

## 2019-10-01 NOTE — PROGRESS NOTE ADULT - ATTENDING COMMENTS
63F s/p R femur IMN I&D and now on only IV Vancomycin with cultures  + for MRSA. Will need 6 weeks of IV antibiotics via PICC line. status post right hip ORIF and hardware remains in place, as the present infection was limited to cellulitis and soft tissue with no invasion into the right hip joint. I am a non participating [facility] physician seeing Pt in coverage for Dr Wagner. The above patient examination was reviewed with Dr. Troy and I agree with his evaluation, assessment and treatment plan.  Alexander Wagner M.D. Beginning to ambulate and doing well. No medical complications post-op to date and to proceed with physical therapy, as tolerated. Continues pulmonary toilet to lessen atelectasis, leg exercises as taught to lessen the risk of DVT and supervised pain medications for post-op pain control. I anticipate discharge to home to follow when cleared by orthopedics.

## 2019-10-01 NOTE — PROGRESS NOTE ADULT - ASSESSMENT
Impression: Stable       Plan:   Continue present treatment                 Out of bed, ambulate, weight bearing as tolerated                  Physical therapy follow up                  ID note appreciated, PICC Line in- 6 weeks Abx, then PO suppressive                  Continue to monitor    Kahlil Weston PA-C  Orthopaedic Surgery  Team pager [phone]  [phone]

## 2019-10-01 NOTE — PROGRESS NOTE ADULT - ASSESSMENT
Patient is a 64 yo woman presents with an infected right IMN. Patient is a 64 yo woman presents with an infected right IMN. States pain is well controlled s/p R femur IMN I&D and now on only IV Vancomycin with cultures  + for MRSA. Will need 6 weeks of IV antibiotics via PICC line. PICC line was placed without complication. Home care arrangements in progress.

## 2019-10-01 NOTE — PROGRESS NOTE ADULT - SUBJECTIVE AND OBJECTIVE BOX
63F was seen and examined  with active swelling and drainage of the right hip that required orthopedic ORIF for drainage, cultures and irrigation performed on 09/25/19.  Her comorbidities include multiple sclerosis with  slow progression over the past 30 years, muscle spasms, osteoporosis, malnutrition  with weight loss and myopathy . Exam, lab, EKG and CXR are stable. Patient seen OOB to chair. States pain is well controlled s/p R femur IMN I&D and now on only IV Vancomycin with cultures  + for MRSA. Will need 6 weeks of IV antibiotics via PICC line. PICC line was placed without complication.    MEDICATIONS  (STANDING):  buPROPion XL . 300 milliGRAM(s) Oral daily  chlorhexidine 4% Liquid 1 Application(s) Topical <User Schedule>  citalopram 40 milliGRAM(s) Oral daily  docusate sodium 100 milliGRAM(s) Oral three times a day  enoxaparin Injectable 40 milliGRAM(s) SubCutaneous daily  hydrocortisone 1% Cream 1 Application(s) Topical daily  senna 2 Tablet(s) Oral at bedtime  vancomycin  IVPB 1000 milliGRAM(s) IV Intermittent every 12 hours  vancomycin  IVPB        MEDICATIONS  (PRN):  acetaminophen   Tablet .. 975 milliGRAM(s) Oral every 8 hours PRN Temp greater or equal to 38C (100.4F), Mild Pain (1 - 3)  ALPRAZolam 0.5 milliGRAM(s) Oral daily PRN Anxiety  diphenhydrAMINE   Injectable 25 milliGRAM(s) IntraMuscular every 6 hours PRN Rash and/or Itching  oxyCODONE    IR 2.5 milliGRAM(s) Oral every 4 hours PRN Moderate Pain (4 - 6)  oxyCODONE    IR 5 milliGRAM(s) Oral every 4 hours PRN Severe Pain (7 - 10)  sodium chloride 0.9% lock flush 10 milliLiter(s) IV Push every 1 hour PRN Pre/post blood products, medications, blood draw, and to maintain line patency          Vital Signs Last 24 Hrs  T(C): 36.7 (01 Oct 2019 08:43), Max: 36.7 (30 Sep 2019 13:15)  T(F): 98.1 (01 Oct 2019 08:43), Max: 98.1 (30 Sep 2019 13:15)  HR: 76 (01 Oct 2019 08:43) (76 - 99)  BP: 114/74 (01 Oct 2019 08:43) (113/76 - 133/81)  BP(mean): --  RR: 16 (01 Oct 2019 08:43) (16 - 16)  SpO2: 98% (01 Oct 2019 08:43) (97% - 100%)                LABS: No labs obtained today          CAPILLARY BLOOD GLUCOSE          RADIOLOGY & ADDITIONAL TESTS: 63F was seen and examined  with active swelling and drainage of the right hip that required orthopedic ORIF for drainage, cultures and irrigation performed on 09/25/19.  Her comorbidities include multiple sclerosis with  slow progression over the past 30 years, muscle spasms, osteoporosis, malnutrition  with weight loss and myopathy . Exam, lab, EKG and CXR are stable. Patient seen OOB to chair. States pain is well controlled s/p R femur IMN I&D and now on only IV Vancomycin with cultures  + for MRSA. Will need 6 weeks of IV antibiotics via PICC line. PICC line was placed without complication. Home care arrangements in progress.    MEDICATIONS  (STANDING):  buPROPion XL . 300 milliGRAM(s) Oral daily  chlorhexidine 4% Liquid 1 Application(s) Topical <User Schedule>  citalopram 40 milliGRAM(s) Oral daily  docusate sodium 100 milliGRAM(s) Oral three times a day  enoxaparin Injectable 40 milliGRAM(s) SubCutaneous daily  hydrocortisone 1% Cream 1 Application(s) Topical daily  senna 2 Tablet(s) Oral at bedtime  vancomycin  IVPB 1000 milliGRAM(s) IV Intermittent every 12 hours  vancomycin  IVPB        MEDICATIONS  (PRN):  acetaminophen   Tablet .. 975 milliGRAM(s) Oral every 8 hours PRN Temp greater or equal to 38C (100.4F), Mild Pain (1 - 3)  ALPRAZolam 0.5 milliGRAM(s) Oral daily PRN Anxiety  diphenhydrAMINE   Injectable 25 milliGRAM(s) IntraMuscular every 6 hours PRN Rash and/or Itching  oxyCODONE    IR 2.5 milliGRAM(s) Oral every 4 hours PRN Moderate Pain (4 - 6)  oxyCODONE    IR 5 milliGRAM(s) Oral every 4 hours PRN Severe Pain (7 - 10)  sodium chloride 0.9% lock flush 10 milliLiter(s) IV Push every 1 hour PRN Pre/post blood products, medications, blood draw, and to maintain line patency          Vital Signs Last 24 Hrs  T(C): 36.7 (01 Oct 2019 08:43), Max: 36.7 (30 Sep 2019 13:15)  T(F): 98.1 (01 Oct 2019 08:43), Max: 98.1 (30 Sep 2019 13:15)  HR: 76 (01 Oct 2019 08:43) (76 - 99)  BP: 114/74 (01 Oct 2019 08:43) (113/76 - 133/81)  BP(mean): --  RR: 16 (01 Oct 2019 08:43) (16 - 16)  SpO2: 98% (01 Oct 2019 08:43) (97% - 100%)                LABS: No labs obtained today          CAPILLARY BLOOD GLUCOSE          RADIOLOGY & ADDITIONAL TESTS:

## 2019-10-02 ENCOUNTER — TRANSCRIPTION ENCOUNTER (OUTPATIENT)
Age: 64
End: 2019-10-02

## 2019-10-02 VITALS
HEART RATE: 76 BPM | OXYGEN SATURATION: 98 % | SYSTOLIC BLOOD PRESSURE: 135 MMHG | DIASTOLIC BLOOD PRESSURE: 77 MMHG | TEMPERATURE: 98 F | RESPIRATION RATE: 16 BRPM

## 2019-10-02 LAB
BASOPHILS # BLD AUTO: 0.06 K/UL — SIGNIFICANT CHANGE UP (ref 0–0.2)
BASOPHILS NFR BLD AUTO: 1.6 % — SIGNIFICANT CHANGE UP (ref 0–2)
EOSINOPHIL # BLD AUTO: 0.23 K/UL — SIGNIFICANT CHANGE UP (ref 0–0.5)
EOSINOPHIL NFR BLD AUTO: 6.3 % — HIGH (ref 0–6)
HCT VFR BLD CALC: 29.2 % — LOW (ref 34.5–45)
HGB BLD-MCNC: 9.5 G/DL — LOW (ref 11.5–15.5)
IMM GRANULOCYTES NFR BLD AUTO: 0.5 % — SIGNIFICANT CHANGE UP (ref 0–1.5)
LYMPHOCYTES # BLD AUTO: 0.86 K/UL — LOW (ref 1–3.3)
LYMPHOCYTES # BLD AUTO: 23.4 % — SIGNIFICANT CHANGE UP (ref 13–44)
MCHC RBC-ENTMCNC: 30.4 PG — SIGNIFICANT CHANGE UP (ref 27–34)
MCHC RBC-ENTMCNC: 32.5 GM/DL — SIGNIFICANT CHANGE UP (ref 32–36)
MCV RBC AUTO: 93.6 FL — SIGNIFICANT CHANGE UP (ref 80–100)
MONOCYTES # BLD AUTO: 0.4 K/UL — SIGNIFICANT CHANGE UP (ref 0–0.9)
MONOCYTES NFR BLD AUTO: 10.9 % — SIGNIFICANT CHANGE UP (ref 2–14)
NEUTROPHILS # BLD AUTO: 2.11 K/UL — SIGNIFICANT CHANGE UP (ref 1.8–7.4)
NEUTROPHILS NFR BLD AUTO: 57.3 % — SIGNIFICANT CHANGE UP (ref 43–77)
PLATELET # BLD AUTO: 359 K/UL — SIGNIFICANT CHANGE UP (ref 150–400)
RBC # BLD: 3.12 M/UL — LOW (ref 3.8–5.2)
RBC # FLD: 15.9 % — HIGH (ref 10.3–14.5)
WBC # BLD: 3.68 K/UL — LOW (ref 3.8–10.5)
WBC # FLD AUTO: 3.68 K/UL — LOW (ref 3.8–10.5)

## 2019-10-02 PROCEDURE — 97530 THERAPEUTIC ACTIVITIES: CPT

## 2019-10-02 PROCEDURE — 81001 URINALYSIS AUTO W/SCOPE: CPT

## 2019-10-02 PROCEDURE — 87070 CULTURE OTHR SPECIMN AEROBIC: CPT

## 2019-10-02 PROCEDURE — 87040 BLOOD CULTURE FOR BACTERIA: CPT

## 2019-10-02 PROCEDURE — 97116 GAIT TRAINING THERAPY: CPT

## 2019-10-02 PROCEDURE — 85610 PROTHROMBIN TIME: CPT

## 2019-10-02 PROCEDURE — 85730 THROMBOPLASTIN TIME PARTIAL: CPT

## 2019-10-02 PROCEDURE — 87186 SC STD MICRODIL/AGAR DIL: CPT

## 2019-10-02 PROCEDURE — 97110 THERAPEUTIC EXERCISES: CPT

## 2019-10-02 PROCEDURE — 81025 URINE PREGNANCY TEST: CPT

## 2019-10-02 PROCEDURE — 80202 ASSAY OF VANCOMYCIN: CPT

## 2019-10-02 PROCEDURE — 85027 COMPLETE CBC AUTOMATED: CPT

## 2019-10-02 PROCEDURE — 99285 EMERGENCY DEPT VISIT HI MDM: CPT

## 2019-10-02 PROCEDURE — 86850 RBC ANTIBODY SCREEN: CPT

## 2019-10-02 PROCEDURE — 87086 URINE CULTURE/COLONY COUNT: CPT

## 2019-10-02 PROCEDURE — 71045 X-RAY EXAM CHEST 1 VIEW: CPT

## 2019-10-02 PROCEDURE — 72170 X-RAY EXAM OF PELVIS: CPT

## 2019-10-02 PROCEDURE — 82040 ASSAY OF SERUM ALBUMIN: CPT

## 2019-10-02 PROCEDURE — 86901 BLOOD TYPING SEROLOGIC RH(D): CPT

## 2019-10-02 PROCEDURE — 86900 BLOOD TYPING SEROLOGIC ABO: CPT

## 2019-10-02 PROCEDURE — 80048 BASIC METABOLIC PNL TOTAL CA: CPT

## 2019-10-02 PROCEDURE — 80053 COMPREHEN METABOLIC PANEL: CPT

## 2019-10-02 PROCEDURE — C1751: CPT

## 2019-10-02 PROCEDURE — 97161 PT EVAL LOW COMPLEX 20 MIN: CPT

## 2019-10-02 PROCEDURE — 82306 VITAMIN D 25 HYDROXY: CPT

## 2019-10-02 PROCEDURE — 36569 INSJ PICC 5 YR+ W/O IMAGING: CPT

## 2019-10-02 PROCEDURE — 93005 ELECTROCARDIOGRAM TRACING: CPT

## 2019-10-02 RX ORDER — TRAMADOL HYDROCHLORIDE 50 MG/1
1 TABLET ORAL
Qty: 21 | Refills: 0
Start: 2019-10-02

## 2019-10-02 RX ORDER — ASPIRIN/CALCIUM CARB/MAGNESIUM 324 MG
1 TABLET ORAL
Qty: 66 | Refills: 0
Start: 2019-10-02 | End: 2019-11-03

## 2019-10-02 RX ORDER — OXYCODONE HYDROCHLORIDE 5 MG/1
1 TABLET ORAL
Qty: 0 | Refills: 0 | DISCHARGE
Start: 2019-10-02

## 2019-10-02 RX ORDER — OXYCODONE HYDROCHLORIDE 5 MG/1
1 TABLET ORAL
Qty: 40 | Refills: 0
Start: 2019-10-02

## 2019-10-02 RX ADMIN — Medication 1 APPLICATION(S): at 11:26

## 2019-10-02 RX ADMIN — ENOXAPARIN SODIUM 40 MILLIGRAM(S): 100 INJECTION SUBCUTANEOUS at 11:25

## 2019-10-02 RX ADMIN — Medication 250 MILLIGRAM(S): at 05:40

## 2019-10-02 RX ADMIN — CHLORHEXIDINE GLUCONATE 1 APPLICATION(S): 213 SOLUTION TOPICAL at 10:19

## 2019-10-02 RX ADMIN — CITALOPRAM 40 MILLIGRAM(S): 10 TABLET, FILM COATED ORAL at 11:26

## 2019-10-02 RX ADMIN — BUPROPION HYDROCHLORIDE 300 MILLIGRAM(S): 150 TABLET, EXTENDED RELEASE ORAL at 11:26

## 2019-10-02 NOTE — PROGRESS NOTE ADULT - ATTENDING COMMENTS
Patient DCed home  continue current meds  PO as tolerated  Activity as tolerated  follow up with ortho and ID  I am a non participating [employer] physician seeing Pt in coverage for Dr Wagner Patient DCed home  continue current meds  PO as tolerated  Activity as tolerated  follow up with ortho and ID  I am a non participating [employer] physician seeing Pt in coverage for Dr Wagner. The above patient examination was reviewed with Dr. Troy and I agree with his evaluation, assessment and treatment plan.  Alexander Wagner M.D.

## 2019-10-02 NOTE — PROGRESS NOTE ADULT - PROBLEM SELECTOR PLAN 1
awaiting OR   pain meds as needed  abx as per ID
tolerated washout of wound  pain meds as needed  PICC line placed for prolonged course of abx
tolerated washout of wound  pain meds as needed  follow cultures  currently on ceftriaxone

## 2019-10-02 NOTE — PROGRESS NOTE ADULT - ASSESSMENT
Patient is a 62 yo woman presents with an infected right IMN. States pain is well controlled s/p R femur IMN I&D and now on only IV Vancomycin with cultures  + for MRSA. Will need 6 weeks of IV antibiotics via PICC line. PICC line was placed without complication. Home care arrangements in progress.

## 2019-10-02 NOTE — PROGRESS NOTE ADULT - PROBLEM SELECTOR PROBLEM 1
Surgical site infection

## 2019-10-02 NOTE — PROGRESS NOTE ADULT - PROVIDER SPECIALTY LIST ADULT
Infectious Disease
Internal Medicine
Orthopedics
Infectious Disease
Infectious Disease
Orthopedics

## 2019-10-02 NOTE — PROGRESS NOTE ADULT - SUBJECTIVE AND OBJECTIVE BOX
63F was seen and examined  with active swelling and drainage of the right hip that required orthopedic ORIF for drainage, cultures and irrigation performed on 09/25/19.  Her comorbidities include multiple sclerosis with  slow progression over the past 30 years, muscle spasms, osteoporosis, malnutrition  with weight loss and myopathy . Exam, lab, EKG and CXR are stable. Patient seen OOB to chair. States pain is well controlled s/p R femur IMN I&D and now on only IV Vancomycin with cultures  + for MRSA. Will need 6 weeks of IV antibiotics via PICC line. PICC line was placed without complication. Home care arrangements in progress.    MEDICATIONS  (STANDING):  buPROPion XL . 300 milliGRAM(s) Oral daily  chlorhexidine 4% Liquid 1 Application(s) Topical <User Schedule>  citalopram 40 milliGRAM(s) Oral daily  docusate sodium 100 milliGRAM(s) Oral three times a day  enoxaparin Injectable 40 milliGRAM(s) SubCutaneous daily  hydrocortisone 1% Cream 1 Application(s) Topical daily  senna 2 Tablet(s) Oral at bedtime  vancomycin  IVPB 1000 milliGRAM(s) IV Intermittent every 12 hours  vancomycin  IVPB        MEDICATIONS  (PRN):  acetaminophen   Tablet .. 975 milliGRAM(s) Oral every 8 hours PRN Temp greater or equal to 38C (100.4F), Mild Pain (1 - 3)  ALPRAZolam 0.5 milliGRAM(s) Oral daily PRN Anxiety  diphenhydrAMINE   Injectable 25 milliGRAM(s) IntraMuscular every 6 hours PRN Rash and/or Itching  oxyCODONE    IR 2.5 milliGRAM(s) Oral every 4 hours PRN Moderate Pain (4 - 6)  oxyCODONE    IR 5 milliGRAM(s) Oral every 4 hours PRN Severe Pain (7 - 10)  sodium chloride 0.9% lock flush 10 milliLiter(s) IV Push every 1 hour PRN Pre/post blood products, medications, blood draw, and to maintain line patency          Vital Signs Last 24 Hrs  T(C): 36.7 (01 Oct 2019 08:43), Max: 36.7 (30 Sep 2019 13:15)  T(F): 98.1 (01 Oct 2019 08:43), Max: 98.1 (30 Sep 2019 13:15)  HR: 76 (01 Oct 2019 08:43) (76 - 99)  BP: 114/74 (01 Oct 2019 08:43) (113/76 - 133/81)  BP(mean): --  RR: 16 (01 Oct 2019 08:43) (16 - 16)  SpO2: 98% (01 Oct 2019 08:43) (97% - 100%)                LABS: No labs obtained today          CAPILLARY BLOOD GLUCOSE          RADIOLOGY & ADDITIONAL TESTS:

## 2019-10-02 NOTE — PROGRESS NOTE ADULT - REASON FOR ADMISSION
Right LE I&D, s/p IM Nail
hip wound infection
infected right hip
infected right hip orif
Right LE I&D, s/p IM Nail
infected rt hip incision

## 2019-10-02 NOTE — PROGRESS NOTE ADULT - SUBJECTIVE AND OBJECTIVE BOX
Patient resting without complaints.  No chest pain, SOB, N/V.    T(C): 36.9 (10-02-19 @ 05:01), Max: 37 (10-01-19 @ 15:36)  HR: 81 (10-02-19 @ 05:01) (76 - 98)  BP: 105/69 (10-02-19 @ 05:01) (103/65 - 137/85)  RR: 16 (10-02-19 @ 05:01) (16 - 16)  SpO2: 98% (10-02-19 @ 05:01) (97% - 98%)        Exam:  Alert and Oriented, No Acute Distress  Cards: +S1/S2, RRR  Pulm: CTAB  Extremities:  RUE: PICC in place, dressing C/D/I  RLE: Incison healing well with sutures in place, mild incisional tenderness,  +DF, +PF, Toes with brisk cap refill and dull sensation grossly intact  Calves Soft, Non-tender bilaterally

## 2019-10-02 NOTE — PROGRESS NOTE ADULT - PROBLEM SELECTOR PLAN 2
steroids as needed  continue physical therapy post op  neuro eval as needed

## 2019-10-02 NOTE — DISCHARGE NOTE NURSING/CASE MANAGEMENT/SOCIAL WORK - PATIENT PORTAL LINK FT
You can access the FollowMyHealth Patient Portal offered by [facility] by registering at the following website: [email or web address]. By joining [employer]’s FollowMyHealth portal, you will also be able to view your health information using other applications (apps) compatible with our system.

## 2019-10-02 NOTE — PROGRESS NOTE ADULT - PROBLEM SELECTOR PLAN 3
pain meds as needed  follow for oversedation  GI regimen to prevent constipation

## 2019-10-02 NOTE — PROGRESS NOTE ADULT - ASSESSMENT
A/P: 62 y/o F POD#& s/p I+D of Infected R Infected IM Nail      IV Abx- Vancomycin  PICC care  RLE WBAT  Pain management prn  GI ppx  Discharge planning- pt wants to go home        ZITA Reynoso  Orthopedic Surgery  [phone]

## 2019-10-02 NOTE — PROGRESS NOTE ADULT - NSHPATTENDINGPLANDISCUSS_GEN_ALL_CORE
Patient and staff
Patient and staff
Patient and family
Patient and family
Patient and staff

## 2019-10-02 NOTE — PROGRESS NOTE ADULT - PROBLEM SELECTOR PROBLEM 2
MS (multiple sclerosis)

## 2019-10-02 NOTE — PROGRESS NOTE ADULT - SUBJECTIVE AND OBJECTIVE BOX
CC: f/u for  infected right hip orif  Patient reports  feels ok, a bit wobbly  REVIEW OF SYSTEMS:  All other review of systems negative (Comprehensive ROS)    Antimicrobials Day #  :7/42  vancomycin  IVPB 1000 milliGRAM(s) IV Intermittent every 12 hours  vancomycin  IVPB        Other Medications Reviewed    T(F): 98.5 (10-02-19 @ 08:50), Max: 98.6 (10-01-19 @ 15:36)  HR: 76 (10-02-19 @ 08:50)  BP: 135/77 (10-02-19 @ 08:50)  RR: 16 (10-02-19 @ 08:50)  SpO2: 98% (10-02-19 @ 08:50)  Wt(kg): --    PHYSICAL EXAM:  General: alert, no acute distress  Eyes:  anicteric, no conjunctival injection, no discharge  Oropharynx: no lesions or injection 	  Neck: supple, without adenopathy  Lungs: clear to auscultation  Heart: regular rate and rhythm; no murmur, rubs or gallops  Abdomen: soft, nondistended, nontender, without mass or organomegaly  Skin: no lesions  Extremities: no clubbing, cyanosis,.. right hip wound clean, no redness, no drainage minimal fluid  Neurologic: alert, oriented, moves all extremities    LAB RESULTS:                        9.5    3.68  )-----------( 359      ( 02 Oct 2019 09:05 )             29.2               MICROBIOLOGY:  RECENT CULTURES:      RADIOLOGY REVIEWED:      < from: Xray Chest 1 View- PORTABLE-Urgent (09.30.19 @ 14:36) >  AM:  XR CHEST PORTABLE URGENT 1V                            PROCEDURE DATE:  09/30/2019            INTERPRETATION:  CLINICAL INFORMATION: Right-sided PICC placement..    EXAM: Frontal radiograph of the chest.    COMPARISON: Chest radiograph from 9/24/2019    FINDINGS:  Right-sided PICC with the tip in the SVC. There is an additional catheter   seen in the upper abdomen with the tip overlying the mid thorax/spinal   canal.     The heart size is normal.    The lungs are clear. No pneumothorax or pleural effusions.    The bones are unremarkable.    IMPRESSION:     Right-sided PICC with the tip in the SVC.       < end of copied text >      Assessment:  Patient with right hip orif, developed infected hematoma with tracking of sinus to hardware w/p washout and grew mrsa to complete 6 weeks of iv vancomycin  Plan:  continue vanco for 5 more weeks then po minocycline suppression  f/u in office in 2 weeks  weekly vanco tr, cbc with dif and cmp

## 2019-10-04 ENCOUNTER — EMERGENCY (EMERGENCY)
Facility: HOSPITAL | Age: 64
LOS: 1 days | Discharge: HOME | End: 2019-10-04
Attending: EMERGENCY MEDICINE
Payer: COMMERCIAL

## 2019-10-04 VITALS
SYSTOLIC BLOOD PRESSURE: 114 MMHG | HEART RATE: 87 BPM | RESPIRATION RATE: 16 BRPM | OXYGEN SATURATION: 98 % | DIASTOLIC BLOOD PRESSURE: 68 MMHG | TEMPERATURE: 97 F | WEIGHT: 89.95 LBS | HEIGHT: 58 IN

## 2019-10-04 VITALS
RESPIRATION RATE: 16 BRPM | DIASTOLIC BLOOD PRESSURE: 71 MMHG | TEMPERATURE: 98 F | HEART RATE: 81 BPM | SYSTOLIC BLOOD PRESSURE: 111 MMHG | OXYGEN SATURATION: 99 %

## 2019-10-04 DIAGNOSIS — Z98.891 HISTORY OF UTERINE SCAR FROM PREVIOUS SURGERY: Chronic | ICD-10-CM

## 2019-10-04 LAB
ALBUMIN SERPL ELPH-MCNC: 4 G/DL — SIGNIFICANT CHANGE UP (ref 3.3–5)
ALP SERPL-CCNC: 130 U/L — HIGH (ref 40–120)
ALT FLD-CCNC: 15 U/L — SIGNIFICANT CHANGE UP (ref 10–45)
ANION GAP SERPL CALC-SCNC: 10 MMOL/L — SIGNIFICANT CHANGE UP (ref 5–17)
AST SERPL-CCNC: 17 U/L — SIGNIFICANT CHANGE UP (ref 10–40)
BILIRUB SERPL-MCNC: 0.6 MG/DL — SIGNIFICANT CHANGE UP (ref 0.2–1.2)
BUN SERPL-MCNC: 22 MG/DL — SIGNIFICANT CHANGE UP (ref 7–23)
CALCIUM SERPL-MCNC: 8.7 MG/DL — SIGNIFICANT CHANGE UP (ref 8.4–10.5)
CHLORIDE SERPL-SCNC: 103 MMOL/L — SIGNIFICANT CHANGE UP (ref 96–108)
CO2 SERPL-SCNC: 28 MMOL/L — SIGNIFICANT CHANGE UP (ref 22–31)
CREAT SERPL-MCNC: 0.45 MG/DL — LOW (ref 0.5–1.3)
GLUCOSE SERPL-MCNC: 90 MG/DL — SIGNIFICANT CHANGE UP (ref 70–99)
HCT VFR BLD CALC: 29.6 % — LOW (ref 34.5–45)
HGB BLD-MCNC: 10 G/DL — LOW (ref 11.5–15.5)
MCHC RBC-ENTMCNC: 31.1 PG — SIGNIFICANT CHANGE UP (ref 27–34)
MCHC RBC-ENTMCNC: 33.8 GM/DL — SIGNIFICANT CHANGE UP (ref 32–36)
MCV RBC AUTO: 91.9 FL — SIGNIFICANT CHANGE UP (ref 80–100)
NRBC # BLD: 0 /100 WBCS — SIGNIFICANT CHANGE UP (ref 0–0)
PLATELET # BLD AUTO: 326 K/UL — SIGNIFICANT CHANGE UP (ref 150–400)
POTASSIUM SERPL-MCNC: 4 MMOL/L — SIGNIFICANT CHANGE UP (ref 3.5–5.3)
POTASSIUM SERPL-SCNC: 4 MMOL/L — SIGNIFICANT CHANGE UP (ref 3.5–5.3)
PROT SERPL-MCNC: 6.2 G/DL — SIGNIFICANT CHANGE UP (ref 6–8.3)
RBC # BLD: 3.22 M/UL — LOW (ref 3.8–5.2)
RBC # FLD: 16.1 % — HIGH (ref 10.3–14.5)
SODIUM SERPL-SCNC: 141 MMOL/L — SIGNIFICANT CHANGE UP (ref 135–145)
WBC # BLD: 5.81 K/UL — SIGNIFICANT CHANGE UP (ref 3.8–10.5)
WBC # FLD AUTO: 5.81 K/UL — SIGNIFICANT CHANGE UP (ref 3.8–10.5)

## 2019-10-04 PROCEDURE — 99284 EMERGENCY DEPT VISIT MOD MDM: CPT

## 2019-10-04 PROCEDURE — 80053 COMPREHEN METABOLIC PANEL: CPT

## 2019-10-04 PROCEDURE — 99283 EMERGENCY DEPT VISIT LOW MDM: CPT

## 2019-10-04 PROCEDURE — 85027 COMPLETE CBC AUTOMATED: CPT

## 2019-10-04 NOTE — ED PROVIDER NOTE - NSFOLLOWUPINSTRUCTIONS_ED_ALL_ED_FT
FOLLOW UP WITH YOUR PRIMARY CARE DOCTOR.  CONTINUE THE HOME VANCOMYCIN INFUSIONS.  RETURN TO THE ED FOR FEVERS, REDNESS, OR ANY OTHER CONCERNING SYMPTOMS.

## 2019-10-04 NOTE — ED PROVIDER NOTE - PHYSICAL EXAMINATION
GEN: calm, cooperative  ENT: mucous membranes moist  HEAD: NCAT  CV: S1 S2   RESP: no respiratory distress  ABD: no abdominal distension  MSK: moves all extremities  NEURO: awake, alert, oriented  PSYCH: affect normal   SKIN: no jaundice evident

## 2019-10-04 NOTE — ED ADULT TRIAGE NOTE - CHIEF COMPLAINT QUOTE
family thinks pt yellow white of eye dull  pt denies any pain or dizziness  pt appears pale  on vanco for mrsa r hip

## 2019-10-04 NOTE — ED ADULT TRIAGE NOTE - BP NONINVASIVE DIASTOLIC (MM HG)
Quality 226: Preventive Care And Screening: Tobacco Use: Screening And Cessation Intervention: Patient screened for tobacco and is an ex-smoker
68
Detail Level: Detailed
Quality 431: Preventive Care And Screening: Unhealthy Alcohol Use - Screening: Patient screened for unhealthy alcohol use using a single question and scores 2 or greater episodes per year and brief intervention occurred
Quality 110: Preventive Care And Screening: Influenza Immunization: Influenza Immunization Administered during Influenza season
Quality 47: Advance Care Plan: Advance Care Planning discussed and documented; advance care plan or surrogate decision maker documented in the medical record.
Quality 130: Documentation Of Current Medications In The Medical Record: Current Medications Documented

## 2019-10-04 NOTE — ED PROVIDER NOTE - NS ED ROS FT
CONSTITUTIONAL: no fevers  HEENT: no dysphagia  CV: no chest pain  RESP: no SOB  GI: no nausea/vomiting  : no dysuria  DERM: no rash  MSK: recent R hip washout  NEURO: no LOC  ENDO: no diabetes

## 2019-10-04 NOTE — ED ADULT NURSE NOTE - OBJECTIVE STATEMENT
62 Yo female PMHx MRSA of wound on R-thigh, MS c/o jaundice. Patient reports that she is on vancomycin Q12 IV for MRSA and today,  noticed that she appeared Jaundice in her hands. Patient is A&OX3, patient skin warm, dry and pink. denies chest pain, SOB, HA, N/V/D, abdominal pain, fever/chills, urinary symptoms, hematuria, weakness, dizziness, numbness, tinging. Peripheral pulses present b/l. Skin warm, dry and pink. Pt placed in position of comfort. Pt educated on call bell system and provided call bell. Bed in lowest position, wheels locked, appropriate side rails raised. Pt denies needs at this time.

## 2019-10-04 NOTE — ED PROVIDER NOTE - OBJECTIVE STATEMENT
62 yo female recent admission for R hip infxn with PICC in place and home vanco infusions p/w concern for jaundice from family, primarily palms of hands.  no abdominal pain, no n/v, no fevers @ home, no abnormal skin erythema/flushing.  patient says she feels "fine".

## 2019-10-04 NOTE — ED PROVIDER NOTE - PATIENT PORTAL LINK FT
You can access the FollowMyHealth Patient Portal offered by [facility] by registering at the following website: [email or web address]. By joining [employer]’s FollowMyHealth portal, you will also be able to view your health information using other applications (apps) compatible with our system.

## 2019-10-17 ENCOUNTER — APPOINTMENT (OUTPATIENT)
Dept: ORTHOPEDIC SURGERY | Facility: CLINIC | Age: 64
End: 2019-10-17
Payer: COMMERCIAL

## 2019-10-17 DIAGNOSIS — Z87.81 PERSONAL HISTORY OF (HEALED) TRAUMATIC FRACTURE: ICD-10-CM

## 2019-10-17 PROCEDURE — 73552 X-RAY EXAM OF FEMUR 2/>: CPT | Mod: RT

## 2019-10-17 PROCEDURE — 99024 POSTOP FOLLOW-UP VISIT: CPT

## 2019-11-23 ENCOUNTER — EMERGENCY (EMERGENCY)
Facility: HOSPITAL | Age: 64
LOS: 1 days | Discharge: HOME | End: 2019-11-23
Attending: EMERGENCY MEDICINE | Admitting: EMERGENCY MEDICINE
Payer: COMMERCIAL

## 2019-11-23 VITALS
TEMPERATURE: 98 F | WEIGHT: 89.95 LBS | SYSTOLIC BLOOD PRESSURE: 174 MMHG | OXYGEN SATURATION: 98 % | HEART RATE: 97 BPM | RESPIRATION RATE: 16 BRPM | HEIGHT: 70 IN | DIASTOLIC BLOOD PRESSURE: 106 MMHG

## 2019-11-23 DIAGNOSIS — Z98.891 HISTORY OF UTERINE SCAR FROM PREVIOUS SURGERY: Chronic | ICD-10-CM

## 2019-11-23 LAB
ALBUMIN SERPL ELPH-MCNC: 4 G/DL — SIGNIFICANT CHANGE UP (ref 3.3–5)
ALP SERPL-CCNC: 115 U/L — SIGNIFICANT CHANGE UP (ref 40–120)
ALT FLD-CCNC: 19 U/L — SIGNIFICANT CHANGE UP (ref 12–78)
ANION GAP SERPL CALC-SCNC: 6 MMOL/L — SIGNIFICANT CHANGE UP (ref 5–17)
APPEARANCE UR: CLEAR — SIGNIFICANT CHANGE UP
APTT BLD: 32.6 SEC — SIGNIFICANT CHANGE UP (ref 28.5–37)
AST SERPL-CCNC: 24 U/L — SIGNIFICANT CHANGE UP (ref 15–37)
BASOPHILS # BLD AUTO: 0.07 K/UL — SIGNIFICANT CHANGE UP (ref 0–0.2)
BASOPHILS NFR BLD AUTO: 0.8 % — SIGNIFICANT CHANGE UP (ref 0–2)
BILIRUB SERPL-MCNC: 1 MG/DL — SIGNIFICANT CHANGE UP (ref 0.2–1.2)
BILIRUB UR-MCNC: NEGATIVE — SIGNIFICANT CHANGE UP
BUN SERPL-MCNC: 22 MG/DL — SIGNIFICANT CHANGE UP (ref 7–23)
CALCIUM SERPL-MCNC: 9.1 MG/DL — SIGNIFICANT CHANGE UP (ref 8.5–10.1)
CHLORIDE SERPL-SCNC: 105 MMOL/L — SIGNIFICANT CHANGE UP (ref 96–108)
CO2 SERPL-SCNC: 29 MMOL/L — SIGNIFICANT CHANGE UP (ref 22–31)
COLOR SPEC: YELLOW — SIGNIFICANT CHANGE UP
CREAT SERPL-MCNC: 0.5 MG/DL — SIGNIFICANT CHANGE UP (ref 0.5–1.3)
DIFF PNL FLD: ABNORMAL
EOSINOPHIL # BLD AUTO: 0.2 K/UL — SIGNIFICANT CHANGE UP (ref 0–0.5)
EOSINOPHIL NFR BLD AUTO: 2.3 % — SIGNIFICANT CHANGE UP (ref 0–6)
GLUCOSE SERPL-MCNC: 95 MG/DL — SIGNIFICANT CHANGE UP (ref 70–99)
GLUCOSE UR QL: NEGATIVE — SIGNIFICANT CHANGE UP
HCT VFR BLD CALC: 36.3 % — SIGNIFICANT CHANGE UP (ref 34.5–45)
HGB BLD-MCNC: 12.3 G/DL — SIGNIFICANT CHANGE UP (ref 11.5–15.5)
IMM GRANULOCYTES NFR BLD AUTO: 0.2 % — SIGNIFICANT CHANGE UP (ref 0–1.5)
INR BLD: 0.98 RATIO — SIGNIFICANT CHANGE UP (ref 0.88–1.16)
KETONES UR-MCNC: NEGATIVE — SIGNIFICANT CHANGE UP
LEUKOCYTE ESTERASE UR-ACNC: NEGATIVE — SIGNIFICANT CHANGE UP
LYMPHOCYTES # BLD AUTO: 0.72 K/UL — LOW (ref 1–3.3)
LYMPHOCYTES # BLD AUTO: 8.4 % — LOW (ref 13–44)
MCHC RBC-ENTMCNC: 30.8 PG — SIGNIFICANT CHANGE UP (ref 27–34)
MCHC RBC-ENTMCNC: 33.9 GM/DL — SIGNIFICANT CHANGE UP (ref 32–36)
MCV RBC AUTO: 90.8 FL — SIGNIFICANT CHANGE UP (ref 80–100)
MONOCYTES # BLD AUTO: 0.38 K/UL — SIGNIFICANT CHANGE UP (ref 0–0.9)
MONOCYTES NFR BLD AUTO: 4.4 % — SIGNIFICANT CHANGE UP (ref 2–14)
NEUTROPHILS # BLD AUTO: 7.21 K/UL — SIGNIFICANT CHANGE UP (ref 1.8–7.4)
NEUTROPHILS NFR BLD AUTO: 83.9 % — HIGH (ref 43–77)
NITRITE UR-MCNC: NEGATIVE — SIGNIFICANT CHANGE UP
NRBC # BLD: 0 /100 WBCS — SIGNIFICANT CHANGE UP (ref 0–0)
PH UR: 6 — SIGNIFICANT CHANGE UP (ref 5–8)
PLATELET # BLD AUTO: 322 K/UL — SIGNIFICANT CHANGE UP (ref 150–400)
POTASSIUM SERPL-MCNC: 4 MMOL/L — SIGNIFICANT CHANGE UP (ref 3.5–5.3)
POTASSIUM SERPL-SCNC: 4 MMOL/L — SIGNIFICANT CHANGE UP (ref 3.5–5.3)
PROT SERPL-MCNC: 6.8 G/DL — SIGNIFICANT CHANGE UP (ref 6–8.3)
PROT UR-MCNC: NEGATIVE — SIGNIFICANT CHANGE UP
PROTHROM AB SERPL-ACNC: 11.2 SEC — SIGNIFICANT CHANGE UP (ref 10–12.9)
RBC # BLD: 4 M/UL — SIGNIFICANT CHANGE UP (ref 3.8–5.2)
RBC # FLD: 13.9 % — SIGNIFICANT CHANGE UP (ref 10.3–14.5)
SODIUM SERPL-SCNC: 140 MMOL/L — SIGNIFICANT CHANGE UP (ref 135–145)
SP GR SPEC: 1.02 — SIGNIFICANT CHANGE UP (ref 1.01–1.02)
TROPONIN I SERPL-MCNC: <.015 NG/ML — SIGNIFICANT CHANGE UP (ref 0.01–0.04)
UROBILINOGEN FLD QL: NEGATIVE — SIGNIFICANT CHANGE UP
WBC # BLD: 8.6 K/UL — SIGNIFICANT CHANGE UP (ref 3.8–10.5)
WBC # FLD AUTO: 8.6 K/UL — SIGNIFICANT CHANGE UP (ref 3.8–10.5)

## 2019-11-23 PROCEDURE — 80053 COMPREHEN METABOLIC PANEL: CPT

## 2019-11-23 PROCEDURE — 71045 X-RAY EXAM CHEST 1 VIEW: CPT

## 2019-11-23 PROCEDURE — 81001 URINALYSIS AUTO W/SCOPE: CPT

## 2019-11-23 PROCEDURE — 70450 CT HEAD/BRAIN W/O DYE: CPT | Mod: 26

## 2019-11-23 PROCEDURE — 85730 THROMBOPLASTIN TIME PARTIAL: CPT

## 2019-11-23 PROCEDURE — 71045 X-RAY EXAM CHEST 1 VIEW: CPT | Mod: 26

## 2019-11-23 PROCEDURE — 85610 PROTHROMBIN TIME: CPT

## 2019-11-23 PROCEDURE — 82962 GLUCOSE BLOOD TEST: CPT

## 2019-11-23 PROCEDURE — 93010 ELECTROCARDIOGRAM REPORT: CPT

## 2019-11-23 PROCEDURE — 99284 EMERGENCY DEPT VISIT MOD MDM: CPT | Mod: 25

## 2019-11-23 PROCEDURE — 70450 CT HEAD/BRAIN W/O DYE: CPT

## 2019-11-23 PROCEDURE — 99285 EMERGENCY DEPT VISIT HI MDM: CPT

## 2019-11-23 PROCEDURE — 36415 COLL VENOUS BLD VENIPUNCTURE: CPT

## 2019-11-23 PROCEDURE — 85027 COMPLETE CBC AUTOMATED: CPT

## 2019-11-23 PROCEDURE — 93005 ELECTROCARDIOGRAM TRACING: CPT

## 2019-11-23 PROCEDURE — 84484 ASSAY OF TROPONIN QUANT: CPT

## 2019-11-23 RX ORDER — CITALOPRAM 10 MG/1
1 TABLET, FILM COATED ORAL
Qty: 0 | Refills: 0 | DISCHARGE

## 2019-11-23 RX ORDER — SODIUM CHLORIDE 9 MG/ML
1000 INJECTION INTRAMUSCULAR; INTRAVENOUS; SUBCUTANEOUS ONCE
Refills: 0 | Status: COMPLETED | OUTPATIENT
Start: 2019-11-23 | End: 2019-11-23

## 2019-11-23 RX ORDER — BACLOFEN 100 %
0 POWDER (GRAM) MISCELLANEOUS
Qty: 0 | Refills: 0 | DISCHARGE

## 2019-11-23 RX ORDER — BUPROPION HYDROCHLORIDE 150 MG/1
1 TABLET, EXTENDED RELEASE ORAL
Qty: 0 | Refills: 0 | DISCHARGE

## 2019-11-23 RX ADMIN — SODIUM CHLORIDE 1000 MILLILITER(S): 9 INJECTION INTRAMUSCULAR; INTRAVENOUS; SUBCUTANEOUS at 23:03

## 2019-11-23 NOTE — ED ADULT NURSE NOTE - OBJECTIVE STATEMENT
patient came in ED from home, [facility]. as per EMS patient was noted unresponsive at home. on arrival, patient noted awake, cooperative but no recall of the event. non-labored respiration noted. denies chest pain. on fall precaution.

## 2019-11-23 NOTE — ED PROVIDER NOTE - ATTENDING CONTRIBUTION TO CARE
I have personally performed a face to face diagnostic evaluation on this patient.  I have reviewed the PA note and agree with the history, exam, and plan of care, except as noted.  History and Exam by me shows  as per daughter, pt was not responding transiently.  pt denied any complaints c no loc.  Hg was about 10 last month.  head nc/at.  lungs cta .  cbc cw hemoconcentration.   ct head neg,  ivf hydration c improvement and dc home.

## 2019-11-23 NOTE — ED PROVIDER NOTE - CHPI ED SYMPTOMS NEG
no shortness of breath/no dizziness/no chills/no vomiting/no diaphoresis/no back pain/no chest pain/no cough/no fever/no nausea

## 2019-11-23 NOTE — ED PROVIDER NOTE - OBJECTIVE STATEMENT
pt is a 65yo female with pmhx of MS presents s/p syncopal event. per , pt was with her daughter and pt had episode of unresponsiveness without loc.  unsure how long incident lasted. pt has been having "memory issues" for months per . pt had recent right hip fx s/p infection on minocycline. pt is without complaints at this time.

## 2019-11-23 NOTE — ED ADULT NURSE NOTE - CHPI ED NUR SYMPTOMS NEG
no diaphoresis/no chest pain/no chills/no shortness of breath/no back pain/no congestion/no vomiting/no dizziness/no fever/no nausea

## 2019-11-23 NOTE — ED PROVIDER NOTE - PATIENT PORTAL LINK FT
You can access the FollowMyHealth Patient Portal offered by [facility] by registering at the following website: [email or web address]. By joining [facility]’s FollowMyHealth portal, you will also be able to view your health information using other applications (apps) compatible with our system.

## 2019-11-23 NOTE — ED PROVIDER NOTE - CLINICAL SUMMARY MEDICAL DECISION MAKING FREE TEXT BOX
pt with possible syncopal event. without complaints at this time. will do labs, trop, ekg, cxr, ivf, dr schultz to follow up

## 2019-11-23 NOTE — ED ADULT NURSE NOTE - NSIMPLEMENTINTERV_GEN_ALL_ED
General
Implemented All Fall with Harm Risk Interventions:  [place] to call system. Call bell, personal items and telephone within reach. Instruct patient to call for assistance. Room bathroom lighting operational. Non-slip footwear when patient is off stretcher. Physically safe environment: no spills, clutter or unnecessary equipment. Stretcher in lowest position, wheels locked, appropriate side rails in place. Provide visual cue, wrist band, yellow gown, etc. Monitor gait and stability. Monitor for mental status changes and reorient to person, place, and time. Review medications for side effects contributing to fall risk. Reinforce activity limits and safety measures with patient and family. Provide visual clues: red socks.

## 2019-11-23 NOTE — ED PROVIDER NOTE - PROGRESS NOTE DETAILS
All results were explained to patient and/or family and a copy of all available results given.  pt felt better and wanted to go home.

## 2019-11-24 VITALS
RESPIRATION RATE: 14 BRPM | DIASTOLIC BLOOD PRESSURE: 73 MMHG | OXYGEN SATURATION: 97 % | SYSTOLIC BLOOD PRESSURE: 127 MMHG | HEART RATE: 92 BPM

## 2019-11-26 NOTE — ED ADULT NURSE NOTE - BMI (KG/M2)
[facility]   Ceci Stovall  Department of Neurology  [address] [place] [address] KAUSHIK Berger  [id number]  Phone [phone]     Fax  [phone]        Patient: Bernie Liao YOB: 1970  Patient ID: [id number] Age: 49 Years 4 Months  Sex: Female Ref. Provider: Deanna Cortez MD  Notes: BLE/Tingling and weakness in BLE after migraine, Vitamin D deficiency.      SUMMARY      Nerve conduction studies were performed in the right and left lower extremity. The right peroneal motor study recording the extensor digitorum brevis showed a normal amplitude, normal distal latency and normal conduction velocity. No conduction block or focal slowing was present across the fibular neck. The right tibial motor study recording the abductor hallucis brevis showed a normal amplitude, normal distal latency and normal conduction velocity.     Right sural sensory response showed a normal amplitude and conduction velocity. Right superficial peroneal sensory response showed a normal amplitude and conduction velocity.     The left peroneal motor study recording the extensor digitorum brevis showed a normal amplitude, normal distal latency and normal conduction velocity. No conduction block or focal slowing was present across the fibular neck. The left tibial motor study recording the abductor hallucis brevis showed a normal amplitude, normal distal latency and normal conduction velocity.     Left sural sensory response showed a normal amplitude and conduction velocity. Left superficial peroneal sensory response showed a normal amplitude and conduction velocity.       Needle EMG of the right lower extremity and lumbar paraspinal muscles was performed. No denervation was present in any muscle. All motor unit potentials morphology, activation and recruitment patterns were normal.     Needle EMG of the left lower extremity and lumbar paraspinal muscles was performed. No denervation was present in any muscle. All motor  unit potentials morphology, activation and recruitment patterns were normal.                     IMPRESSION      This is a normal study.     There is no electrophysiologic evidence of lumbosacral radiculopathy, plexopathy, myopathy or large fiber peripheral neuropathy of either the right or left lower extremity.                        ------------------------------------------              Deanna Cortez M.D., F.A.A.N.      Diplomate, American Board of Psychiatry and Neurology  Diplomate, American Board of Clinical Neurophysiology   Fellow, American Academy of Neurology       Sensory NCS      Nerve / Sites Rec. Site Onset Peak NP Amp PP Amp Dist Be d Lat.2     ms ms µV µV cm m/s ms   L SURAL - Lat Mall      Calf Lat Mall 2.24 2.86 17.4 7.8 14 62.5 2.86      Ref.   4.50  5.0  40.0       Calf Lat Mall 2.29 2.86 14.0 11.8 14 61.1 2.86   R SURAL - Lat Mall      Calf Lat Mall 2.92 3.65 6.8 7.3 14 48.0 3.65      Ref.   4.50  5.0  40.0       Calf Lat Mall 2.97 3.54 6.9 6.3 14 47.2 3.54   L SUP PERONEAL      Lat Leg Ankle 2.08 2.60 8.8 4.0 10 48.0 2.60      Ref.   4.50  5.0  40.0       Lat Leg Ankle 2.08 2.86 12.2 5.2 10 48.0 2.86   R SUP PERONEAL      Lat Leg Ankle 2.81 3.44 7.1 3.7 10 35.6 3.44      Ref.   4.50  5.0  40.0       Lat Leg Ankle 2.76 3.44 6.9 4.0 10 36.2 3.44       Motor NCS      Nerve / Sites Rec. Site Lat Amp Dist Be     ms mV cm m/s   L COMM PERONEAL - EDB      Ankle EDB 4.32 3.4 8       Ref.  5.50 3.0        FibHead EDB 10.05 3.3 27 47.1      Ref.     40.0      Knee EDB 11.30 3.0 10 80.0   R COMM PERONEAL - EDB      Ankle EDB 3.96 5.8 8       Ref.  5.50 3.0        FibHead EDB 10.42 5.3 28 43.4      Ref.     40.0      Knee EDB 12.24 5.1 11 60.3   L TIBIAL (KNEE) - AH      Ankle AH 3.33 17.3 8       Ref.  6.00 8.0        Knee AH 9.69 13.9 32 50.4      Ref.     40.0   R TIBIAL (KNEE) - AH      Ankle AH 2.40 18.8 8       Ref.  6.00 8.0        Knee AH 10.31 14.8 35 44.2      Ref.     40.0               EMG  Summary Table     Spontaneous MUAP Recruitment    IA Fib PSW Fasc Other Amp Dur. PPP Pattern   L. VAST MEDIALIS L4 N None None None - N N N N   R. VAST MEDIALIS L4 N None None None - N N N N   L. VAST LATERALIS L4 N None None None - N N N N   R. VAST LATERALIS L4 N None None None - N N N N   L. TIB ANTERIOR L4-5 N None None None - N N N N   R. TIB ANTERIOR L4-5 N None None None - N N N N   L. TIB POSTERIOR L5, S1 N None None None - N N N N   R. TIB POSTERIOR L5, S1 N None None None - N N N N   L. SOLEUS S1-2 N None None None - N N N N   R. SOLEUS S1-2 N None None None - N N N N   L. GASTROCN MED S1-2 N None None None - N N N N   R. GASTROCN MED S1-2 N None None None - N N N N   L. EXT TREVINO LONG L5, S1 N None None None - N N N N   R. EXT TREVINO LONG L5, S1 N None None None - N N N N   L. FLEX DIG LONGUS L5, S1-2 N None None None - N N N N   R. FLEX DIG LONGUS L5, S1-2 N None None None - N N N N   L. LUMB PSP (L) N None None None - N N N N   R. LUMB PSP (L) N None None None - N N N N   L. LUMB PSP (M) N None None None - N N N N   R. LUMB PSP (M) N None None None - N N N N   L. LUMB PSP (U) N None None None - N N N N   R. LUMB PSP (U) N None None None - N N N N              19.8

## 2020-09-11 NOTE — PROGRESS NOTE ADULT - PROBLEM/PLAN-4
DISPLAY PLAN FREE TEXT
PAIN SCALE 6 OF 10.

## 2021-01-27 NOTE — ED PROVIDER NOTE - ENMT, MLM
Labs were drawn for pre PEx pt did not schedule PEx.  Lipid elevated. NonHDL 170 not on statin.  The 10-year ASCVD risk score (Vernantonia OCONNOR Jr., et al., 2013) is: 4.6%  TLC Airway patent, Nasal mucosa clear. Mouth with normal mucosa. Throat has no vesicles, no oropharyngeal exudates and uvula is midline.

## 2021-05-01 NOTE — ED ADULT TRIAGE NOTE - SOURCE OF INFORMATION
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 He had chills and rigors at home. He has presented similarly in the past when he had urinary tract infections. Denies chest pain, palpitations, dyspnea, headache, dizziness, abdominal pain, n/v/d. He states that he wants to go home and rest. Son at bedside states that he does not understand the gravity of the situation. Son states that he has paperwork stating DNR/DNI at home.    In the ED,  Vital Signs T(F) Max: 103.6 HR: 86 - 125 BP: 92/53 RR: 16 SpO2: 95%  Labs significant for: WBC 17.71, C2 1.2, eGFR 53, Lactate 2.9, UA with mod LE, positive nitrites, large blood, WBC 11-25  EKG: Sinus tachy (102), first degree block and non specific T wave inversion in leads II, III and AVF   CXR with low lung volumes with atelectasis, similar to prior study in 2019  CT renal stone hunt:  Moderate left hydrourteronephrosis secondary to a distal left ureteral calculus measuring up to 12 mm  EKG: Sinus Tachycardia at 102bpm    Events last 24 hours: ***  pt s/p urgent cystoscopy with stent placement, retrograde pyelogram.   Admitted to ICU for hypotension on pressors.    PAST MEDICAL & SURGICAL HISTORY:  Benign prostatic hyperplasia, presence of lower urinary tract symptoms unspecified, unspecified morphology    Hyperlipidemia    GERD (gastroesophageal reflux disease)    CAD (coronary artery disease)    UTI (urinary tract infection)    H/O heart artery stent  stent x 2    History of prostate surgery    S/P ureteral stent placement  Left sided      Allergies    No Known Allergies    Intolerances      FAMILY HISTORY:  FH: hypertension (Child)        Review of Systems:  CONSTITUTIONAL: ++ fever, and  fatigue  all other neg on interview    Medications:    norepinephrine Infusion 0.04 MICROgram(s)/kG/Min IV Continuous <Continuous>    pantoprazole  Injectable 40 milliGRAM(s) IV Push daily    chlorhexidine 2% Cloths 1 Application(s) Topical <User Schedule>    ICU Vital Signs Last 24 Hrs  T(C): 36.4 (2021 01:45), Max: 39.8 (2021 20:35)  T(F): 97.5 (2021 01:45), Max: 103.6 (2021 20:35)  HR: 69 (2021 02:30) (55 - 125)  BP: 119/69 (2021 02:30) (77/48 - 120/45)  BP(mean): 88 (2021 02:30) (88 - 88)  ABP: --  ABP(mean): --  RR: 41 (2021 02:30) (14 - 41)  SpO2: 95% (2021 02:30) (94% - 100%)    Vital Signs Last 24 Hrs  T(C): 36.4 (2021 01:45), Max: 39.8 (2021 20:35)  T(F): 97.5 (:45), Max: 103.6 (2021 20:35)  HR: 69 (2021 02:30) (55 - 125)  BP: 119/69 (2021 02:30) (77/48 - 120/45)  BP(mean): 88 (2021 02:30) (88 - 88)  RR: 41 (2021 02:30) (14 - 41)  SpO2: 95% (2021 02:30) (94% - 100%)        I&O's Detail    2021 07:01  -  2021 03:01  --------------------------------------------------------  IN:    Lactated Ringers: 150 mL  Total IN: 150 mL    OUT:    Indwelling Catheter - Urethral (mL): 950 mL  Total OUT: 950 mL    Total NET: -800 mL    LABS:                        13.8   17.71 )-----------( 176      ( 2021 20:54 )             40.0         138  |  106  |  18  ----------------------------<  149<H>  3.9   |  24  |  1.20    Ca    9.4      2021 20:54    TPro  7.2  /  Alb  3.1<L>  /  TBili  1.3<H>  /  DBili  x   /  AST  18  /  ALT  8<L>  /  AlkPhos  81        CARDIAC MARKERS ( 2021 20:54 )  <.015 ng/mL / x     / 26 U/L / x     / x          CAPILLARY BLOOD GLUCOSE        PT/INR - ( 2021 20:54 )   PT: 13.6 sec;   INR: 1.17 ratio         PTT - ( 2021 20:54 )  PTT:27.8 sec  Urinalysis Basic - ( 2021 20:54 )    Color: Yellow / Appearance: Clear / S.015 / pH: x  Gluc: x / Ketone: Negative  / Bili: Negative / Urobili: Negative   Blood: x / Protein: 30 mg/dL / Nitrite: Positive   Leuk Esterase: Moderate / RBC: 11-25 /HPF / WBC 11-25   Sq Epi: x / Non Sq Epi: Occasional / Bacteria: TNTC      CULTURES:      Physical Examination:    General: No acute distress.  Alert, oriented, interactive, nonfocal    HEENT: Pupils equal, reactive to light.  Symmetric.    PULM: Clear to auscultation bilaterally, no significant sputum production    CVS: Regular rate and rhythm, no murmurs, rubs, or gallops    ABD: Soft, nondistended, nontender, normoactive bowel sounds, no masses    EXT: No edema, nontender    SKIN: Warm and well perfused, no rashes noted.      
EMS
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 He had chills and rigors at home. He has presented similarly in the past when he had urinary tract infections. Denies chest pain, palpitations, dyspnea, headache, dizziness, abdominal pain, n/v/d. He states that he wants to go home and rest. Son at bedside states that he does not understand the gravity of the situation. Son states that he has paperwork stating DNR/DNI at home.    In the ED,  Vital Signs T(F) Max: 103.6 HR: 86 - 125 BP: 92/53 RR: 16 SpO2: 95%  Labs significant for: WBC 17.71, C2 1.2, eGFR 53, Lactate 2.9, UA with mod LE, positive nitrites, large blood, WBC 11-25  EKG: Sinus tachy (102), first degree block and non specific T wave inversion in leads II, III and AVF   CXR with low lung volumes with atelectasis, similar to prior study in 2019  CT renal stone hunt:  Moderate left hydrourteronephrosis secondary to a distal left ureteral calculus measuring up to 12 mm  EKG: Sinus Tachycardia at 102bpm (29 Apr 2021 21:47)    Above reviewed and agree with above.  ID consulted for workup and antibiotic management     PAST MEDICAL & SURGICAL HISTORY:  Benign prostatic hyperplasia, presence of lower urinary tract symptoms unspecified, unspecified morphology    Hyperlipidemia    GERD (gastroesophageal reflux disease)    CAD (coronary artery disease)    UTI (urinary tract infection)    H/O heart artery stent  stent x 2    History of prostate surgery    S/P ureteral stent placement  Left sided        Allergies  No Known Allergies        ANTIMICROBIALS:  meropenem  IVPB 1000 every 8 hours      MEDICATIONS  (STANDING):    ertapenem  IVPB   120 mL/Hr IV Intermittent (04-29-21 @ 22:15)    meropenem  IVPB   100 mL/Hr IV Intermittent (05-01-21 @ 20:56)   100 mL/Hr IV Intermittent (05-01-21 @ 13:11)   100 mL/Hr IV Intermittent (05-01-21 @ 04:00)   100 mL/Hr IV Intermittent (04-30-21 @ 20:11)   100 mL/Hr IV Intermittent (04-30-21 @ 11:57)   100 mL/Hr IV Intermittent (04-30-21 @ 03:35)    piperacillin/tazobactam IVPB...   200 mL/Hr IV Intermittent (04-29-21 @ 20:59)        OTHER MEDS: MEDICATIONS  (STANDING):  acetaminophen   Tablet .. 650 every 6 hours PRN  atorvastatin 20 at bedtime  enoxaparin Injectable 30 daily  finasteride 5 daily  oxycodone    5 mG/acetaminophen 325 mG 1 every 6 hours PRN  oxycodone    5 mG/acetaminophen 325 mG 2 every 6 hours PRN  pantoprazole  Injectable 40 daily  tamsulosin 0.4 at bedtime      SOCIAL HISTORY:     Denies toxic habits   never smoker     FAMILY HISTORY:  FH: hypertension (Child)        REVIEW OF SYSTEMS  [  ] ROS unobtainable because:    [X  ] All other systems negative except as noted below:	    Constitutional:  [ ] fever [ ] chills  [ ] weight loss  [ ] weakness  Skin:  [ ] rash [ ] phlebitis	  Eyes: [ ] icterus [ ] pain  [ ] discharge	  ENMT: [ ] sore throat  [ ] thrush [ ] ulcers [ ] exudates  Respiratory: [ ] dyspnea [ ] hemoptysis [ ] cough [ ] sputum	  Cardiovascular:  [ ] chest pain [ ] palpitations [ ] edema	  Gastrointestinal:  [ ] nausea [ ] vomiting [ ] diarrhea [ ] constipation [ ] pain	  Genitourinary:  [ ] dysuria [X ] frequency [ ] hematuria [ ] discharge [x ] suprapubic pain  [X ] incontinence  Musculoskeletal:  [ ] myalgias [ ] arthralgias [ ] arthritis  [ ] back pain  Neurological:  [ ] headache [ ] seizures  [ ] confusion/altered mental status  Psychiatric:  [ ] anxiety [ ] depression	  Hematology/Lymphatics:  [ ] lymphadenopathy  Endocrine:  [ ] adrenal [ ] thyroid  Allergic/Immunologic:	 [ ] transplant [ ] seasonal    Vital Signs Last 24 Hrs  T(F): 97.6 (05-01-21 @ 21:25), Max: 103.6 (04-29-21 @ 20:35)    Vital Signs Last 24 Hrs  HR: 55 (05-01-21 @ 21:25) (55 - 66)  BP: 114/65 (05-01-21 @ 21:25) (114/65 - 142/67)  RR: 18 (05-01-21 @ 21:25)  SpO2: 92% (05-01-21 @ 21:25) (92% - 97%)  Wt(kg): --    PHYSICAL EXAM:  Constitutional: non-toxic, no distress  HEAD/EYES: anicteric, no conjunctival injection  ENT:  supple, no thrush  Cardiovascular:   normal S1, S2, no murmur, no edema  Respiratory:  clear BS bilaterally, no wheezes, no rales  GI:  soft, non-tender, normal bowel sounds  :  no disla, no CVA tenderness  Musculoskeletal:  no synovitis, normal ROM  Neurologic: awake and alert, normal strength, no focal findings  Skin:  no rash, no erythema, no phlebitis  Heme/Onc: no lymphadenopathy   Psychiatric:  awake, alert, appropriate mood          WBC Count: 10.34 K/uL (05-01 @ 07:11)  WBC Count: 15.99 K/uL (04-30 @ 06:24)  WBC Count: 17.71 K/uL (04-29 @ 20:54)                            11.3   10.34 )-----------( 133      ( 01 May 2021 07:11 )             34.2       05-01    144  |  114<H>  |  13  ----------------------------<  98  3.8   |  27  |  0.93    Ca    9.1      01 May 2021 07:11  Phos  2.3     05-01  Mg     2.0     05-01    TPro  5.9<L>  /  Alb  2.4<L>  /  TBili  1.3<H>  /  DBili  x   /  AST  15  /  ALT  <6<L>  /  AlkPhos  60  04-30      Creatinine Trend: 0.93<--, 1.10<--, 1.20<--        MICROBIOLOGY:    Culture - Blood (collected 04-30-21 @ 20:48)  Source: .Blood Blood-Peripheral  Preliminary Report (05-01-21 @ 21:01):    No growth to date.    Culture - Blood (collected 04-30-21 @ 20:48)  Source: .Blood Blood-Peripheral  Preliminary Report (05-01-21 @ 21:01):    No growth to date.    Culture - Urine (collected 04-30-21 @ 04:51)  Source: Kidney Kidney  Preliminary Report (05-01-21 @ 15:11):    Moderate Pseudomonas aeruginosa    Rare Escherichia coli    Culture - Urine (collected 04-30-21 @ 04:51)  Source: OR Collect Bladder (from O.R.)  Preliminary Report (05-01-21 @ 15:11):    Moderate Escherichia coli    Culture - Blood (collected 04-30-21 @ 01:05)  Source: .Blood Blood-Peripheral  Gram Stain (04-30-21 @ 14:56):    Growth in aerobic bottle: Gram Negative Rods  Preliminary Report (05-01-21 @ 12:36):    Growth in aerobic bottle: Escherichia coli    See previous culture [id number]    Culture - Blood (collected 04-30-21 @ 01:05)  Source: .Blood Blood-Peripheral  Gram Stain (04-30-21 @ 14:09):    Growth in anaerobic bottle: Gram Negative Rods  Preliminary Report (05-01-21 @ 12:34):    Growth in anaerobic bottle: Escherichia coli Susceptibility to follow.    MDRO detected in BCID PCR, resistance marker = CTX-M (ESBL)    ***Blood Panel PCR results on this specimen are available    approximately 3 hours after the Gram stain result.***    Gram stain, PCR, and/or culture results may not always    correspond due to difference in methodologies.    ************************************************************    This PCR assay was performed by multiplex PCR. This    Assay tests for 66 bacterial and resistance gene targets.    Please refer to the [email or web address] test directory    at [email or web address] for details.  Organism: Blood Culture PCR (04-30-21 @ 17:07)  Organism: Blood Culture PCR (04-30-21 @ 17:07)      -  ESBL: Detec      -  Escherichia coli: Detec      Method Type: PCR    Culture - Urine (collected 04-30-21 @ 01:04)  Source: .Urine Clean Catch (Midstream)  Preliminary Report (05-01-21 @ 20:01):    >100,000 CFU/ml Escherichia coli    <10,000 CFU/ml of other organisms        COVID-19 PCR: NotDetec (04-29)      Procalcitonin, Serum: 0.98 (04-30-21 @ 06:24)    RADIOLOGY:  < from: CT Renal Stone Hunt (04.29.21 @ 22:03) >  IMPRESSION:    Moderate left sided hydroureteronephrosis secondary to 1.2 cm calculus distal left ureter.  Left intrarenal calcification.    Bilateral pulmonary nodular opacities as discussed.  Recommend follow-up CT scan of the chest as clinically indicated.    < end of copied text >          
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 He had chills and rigors at home. He has presented similarly in the past when he had urinary tract infections. Denies chest pain, palpitations, dyspnea, headache, dizziness, abdominal pain, n/v/d. He states that he wants to go home and rest. Son at bedside states that he does not understand the gravity of the situation. Son states that he has paperwork stating DNR/DNI at home.    CT- left hydro with distal2 mm ureteral stone and left renal stone, rine grossly positive for infection    PAST MEDICAL & SURGICAL HISTORY:  Benign prostatic hyperplasia, presence of lower urinary tract symptoms unspecified, unspecified morphology    Hyperlipidemia    GERD (gastroesophageal reflux disease)    CAD (coronary artery disease)    UTI (urinary tract infection)    H/O heart artery stent  stent x 2    History of prostate surgery    S/P ureteral stent placement  Left sided        REVIEW OF SYSTEMS:    CONSTITUTIONAL: weakness, fevers or chills  EYES/ENT: No visual changes;  No vertigo or throat pain   NECK: No pain or stiffness  RESPIRATORY: No cough, wheezing, hemoptysis; No shortness of breath  CARDIOVASCULAR: No chest pain or palpitations  GASTROINTESTINAL: No abdominal or epigastric pain. No nausea, vomiting, or hematemesis; No diarrhea or constipation. No melena or hematochezia.  GENITOURINARY: as above  NEUROLOGICAL: No numbness or weakness  SKIN: No itching, burning, rashes, or lesions       MEDICATIONS  (STANDING):  aspirin  chewable 81 milliGRAM(s) Oral daily  atorvastatin 20 milliGRAM(s) Oral at bedtime  dorzolamide 2% Ophthalmic Solution 1 Drop(s) Both EYES three times a day  finasteride 5 milliGRAM(s) Oral daily  lactated ringers. 1000 milliLiter(s) (75 mL/Hr) IV Continuous <Continuous>  multivitamin/minerals 1 Tablet(s) Oral daily  saccharomyces boulardii 250 milliGRAM(s) Oral two times a day  tamsulosin 0.4 milliGRAM(s) Oral at bedtime    MEDICATIONS  (PRN):  lactulose Syrup 20 Gram(s) Oral at bedtime PRN Constipation      Allergies    No Known Allergies    Intolerances        SOCIAL HISTORY:    FAMILY HISTORY:  FH: hypertension (Child)        Vital Signs Last 24 Hrs  T(C): 37.4 (2021 22:14), Max: 39.8 (2021 20:35)  T(F): 99.3 (2021 22:14), Max: 103.6 (2021 20:35)  HR: 79 (2021 23:30) (79 - 125)  BP: 105/55 (2021 23:30) (81/49 - 113/70)  BP(mean): --  RR: 16 (2021 23:30) (15 - 18)  SpO2: 96% (2021 23:30) (94% - 99%)    PHYSICAL EXAM:    Constitutional: sickly well-developed  HEENT: KILO,  Normal Hearing, MMM  Neck: No LAD, No JVD  Back: Normal spine flexure, No CVA tenderness  Respiratory: CTAB   Cardiovascular: S1 and S2, RRR, no M/G/R  Abd: BS+, soft, NT/ND,  CVAT - left  : Normal   phallus, patent  meatus, bilateral descended testes, no masses  CHANTAL: deferred to OR  Extremities: No peripheral edema  Vascular: 2+ peripheral pulses  Neurological: A/O x        LABS:                        13.8   17.71 )-----------( 176      ( 2021 20:54 )             40.0     -    138  |  106  |  18  ----------------------------<  149<H>  3.9   |  24  |  1.20    Ca    9.4      2021 20:54    TPro  7.2  /  Alb  3.1<L>  /  TBili  1.3<H>  /  DBili  x   /  AST  18  /  ALT  8<L>  /  AlkPhos  81      PT/INR - ( 2021 20:54 )   PT: 13.6 sec;   INR: 1.17 ratio         PTT - ( 2021 20:54 )  PTT:27.8 sec  Urinalysis Basic - ( 2021 20:54 )    Color: Yellow / Appearance: Clear / S.015 / pH: x  Gluc: x / Ketone: Negative  / Bili: Negative / Urobili: Negative   Blood: x / Protein: 30 mg/dL / Nitrite: Positive   Leuk Esterase: Moderate / RBC: 11-25 /HPF / WBC 11-25   Sq Epi: x / Non Sq Epi: Occasional / Bacteria: TNTC      Urine Culture:   Hemoglobin: 13.8 g/dL ( @ 20:54)  Hematocrit: 40.0 % ( @ 20:54)      RADIOLOGY & ADDITIONAL STUDIES:    < from: CT Renal Stone Hunt (21 @ 22:03) >  ******PRELIMINARY REPORT******    ******PRELIMINARY REPORT******          EXAM:  CT RENAL STONE HUNT                            PROCEDURE DATE:  2021    ******PRELIMINARY REPORT******    ******PRELIMINARY REPORT******              INTERPRETATION:  Moderate left hydrourteronephrosis secondary to a distal left uerteral calculus measuring up to 12 mm.        ******PRELIMINARY REPORT******    ******PRELIMINARY REPORT******            DEJUAN HERNADEZ DO; Attending Radiologist    < end of copied text >

## 2021-07-29 NOTE — BRIEF OPERATIVE NOTE - NSEVIDNCEORABSCESS_GEN_ALL_CORE
SUBJECTIVE AND OBJECTIVE: reports she had some abdominal pain but received pain relief medication that helped   INTERVAL HPI/OVERNIGHT EVENTS: pt had multiple episodes of nausea and vomiting last night     DNR on chart:   Allergies    penicillins (Rash)    Intolerances    MEDICATIONS  (STANDING):  amLODIPine   Tablet 5 milliGRAM(s) Oral daily  aspirin enteric coated 81 milliGRAM(s) Oral daily  chlorhexidine 4% Liquid 1 Application(s) Topical <User Schedule>  famotidine Injectable 20 milliGRAM(s) IV Push every 12 hours  heparin   Injectable 5000 Unit(s) SubCutaneous every 8 hours  multivitamin 1 Tablet(s) Oral daily    MEDICATIONS  (PRN):  acetaminophen   Tablet .. 650 milliGRAM(s) Oral every 6 hours PRN Mild Pain (1 - 3), Moderate Pain (4 - 6)  levalbuterol Inhalation 0.63 milliGRAM(s) Inhalation every 6 hours PRN shortness of breath  ondansetron Injectable 4 milliGRAM(s) IV Push every 6 hours PRN Nausea and/or Vomiting  polyethylene glycol 3350 17 Gram(s) Oral daily PRN Constipation  sodium chloride 0.9% lock flush 10 milliLiter(s) IV Push every 1 hour PRN Pre/post blood products, medications, blood draw, and to maintain line patency      ITEMS UNCHECKED ARE NOT PRESENT    PRESENT SYMPTOMS: [ ]Unable to obtain due to poor mentation   Source if other than patient:  [ ]Family   [ ]Team     Pain:  [x ]yes [ ]no  QOL impact -   Location -  abdomen                   Aggravating factors - activity  Quality - aching  Radiation -  Timing- intermittent  Severity (0-10 scale): 4  Minimal acceptable level (0-10 scale): 2    Dyspnea:                           [ ]Mild [ ]Moderate [ ]Severe  Anxiety:                             [ ]Mild [ ]Moderate [ ]Severe  Fatigue:                             [x ]Mild [ ]Moderate [ ]Severe  Nausea:                             [x ]Mild [ ]Moderate [ ]Severe  Loss of appetite:              [ ]Mild [ ]Moderate [ ]Severe  Constipation:                    [ ]Mild [ ]Moderate [ ]Severe    PAIN AD Score:	  http://geriatrictoolkit.[employer].edu/cog/painad.pdf (Ctrl + left click to view)    Other Symptoms:  [x ]All other review of systems negative     Palliative Performance Status Version 2:  80%      http://npcrc.org/files/news/palliative_performance_scale_ppsv2.pdf  PHYSICAL EXAM:  Vital Signs Last 24 Hrs  T(C): 36.8 (29 Jul 2021 11:26), Max: 36.8 (29 Jul 2021 11:26)  T(F): 98.2 (29 Jul 2021 11:26), Max: 98.2 (29 Jul 2021 11:26)  HR: 131 (29 Jul 2021 11:26) (110 - 131)  BP: 160/100 (29 Jul 2021 11:26) (151/96 - 175/114)  BP(mean): --  RR: 18 (29 Jul 2021 11:26) (17 - 20)  SpO2: 94% (29 Jul 2021 11:26) (84% - 97%)     I&O's Summary    28 Jul 2021 07:01  -  29 Jul 2021 07:00  --------------------------------------------------------  IN: 920 mL / OUT: 350 mL / NET: 570 mL    29 Jul 2021 07:01  -  29 Jul 2021 15:52  --------------------------------------------------------  IN: 0 mL / OUT: 1400 mL / NET: -1400 mL       GENERAL:  [x ]Alert  [x ]Oriented x 4  [ ]Lethargic  [ ]Cachexia  [ ]Unarousable  [x ]Verbal  [ ]Non-Verbal  Behavioral:   [ ] Anxiety  [ ] Delirium [ ] Agitation [ ] Other  HEENT:  [x ]Normal   [ ]Dry mouth   [ ]ET Tube/Trach  [ ]Oral lesions  PULMONARY:   [x ]Clear [ ]Tachypnea  [ ]Audible excessive secretions   [ ]Rhonchi        [ ]Right [ ]Left [ ]Bilateral  [ ]Crackles        [ ]Right [ ]Left [ ]Bilateral  [ ]Wheezing     [ ]Right [ ]Left [ Bilateral  [ ]Diminished breath sounds [ ]right [ ]left [ ]bilateral  CARDIOVASCULAR:    [ ]Regular [ ]Irregular [x ]Tachy  [ ]Crow [ ]Murmur [ ]Other  GASTROINTESTINAL:  [x ]Soft  [x ]Distended   [ ]+BS  [x ]Non tender [ ]Tender  [ ]PEG [ ]OGT/ NGT  Last BM:   GENITOURINARY:  [x ]Normal [ ] Incontinent   [ ]Oliguria/Anuria   [ ]Charles  MUSCULOSKELETAL:   [x ]Normal   [ ]Weakness  [ ]Bed/Wheelchair bound [ ]Edema  NEUROLOGIC:   [x ]No focal deficits  [ ]Cognitive impairment  [ ]Dysphagia [ ]Dysarthria [ ]Paresis [ ]Other   SKIN:   [x ]Normal    [ ]Rash  [ ]Pressure ulcer(s)       Present on admission [ ]y [ ]n    CRITICAL CARE:  [ ]Shock Present  [ ]Septic [ ]Cardiogenic [ ]Neurologic [ ]Hypovolemic  [ ]Vasopressors [ ]Inotropes  [ ]Respiratory failure present [ ]Mechanical Ventilation [ ]Non-invasive ventilatory support [ ]High-Flow  [ ]Acute  [ ]Chronic [ ]Hypoxic  [ ]Hypercarbic [ ]Other  [ ]Other organ failure     LABS:                        11.3   9.03  )-----------( 287      ( 29 Jul 2021 11:12 )             35.4   07-29    138  |  93<L>  |  22  ----------------------------<  142<H>  3.9   |  26  |  0.93    Ca    10.0      29 Jul 2021 07:45  Phos  3.5     07-28  Mg     2.20     07-28    TPro  6.5  /  Alb  3.2<L>  /  TBili  0.4  /  DBili  <0.2  /  AST  74<H>  /  ALT  59<H>  /  AlkPhos  81  07-28        RADIOLOGY & ADDITIONAL STUDIES: reviewed     Protein Calorie Malnutrition Present: [ ]mild [ ]moderate [ ]severe [ ]underweight [ ]morbid obesity  https://www.andeal.org/vault/2440/web/files/ONC/Table_Clinical%20Characteristics%20to%20Document%20Malnutrition-White%20JV%20et%20al%202012.pdf    Height (cm): 160 (07-26-21 @ 19:38)  Weight (kg): 63 (07-26-21 @ 15:22)  BMI (kg/m2): 24.6 (07-26-21 @ 19:38)    [ ]PPSV2 < or = 30%  [ ]significant weight loss [ ]poor nutritional intake [ ]anasarca    [ ]Artificial Nutrition    REFERRALS:   [ ]Chaplaincy  [ ]Hospice  [ ]Child Life  [x ]Social Work  [ ]Case management [ ]Holistic Therapy     Goals of Care Document:     purulence/abscess

## 2021-09-09 NOTE — PATIENT PROFILE ADULT - JOB HELP
Blood pressure and weight are looking better.  Would encourage him to continue working on healthy lifestyle choices with diet and exercise and intermittent blood pressure monitoring.  
Message is left for patient to call back.   
Owen is at the clinic for a blood pressure check. Using a large cuff the reading was 130/78 with a HR of 64.  His weight was 263.2 lbs. This pressure reflects patient waiting 10 minutes prior to taking.    
The grandmother called back, but it went to voicemail. She was called back and updated. She verbalized understanding. She states she will pass the information on.   
The pt's grandmother called back but it went to voicemail. She states that the pt sleeps during the day. She was calling for the message for the pt. There is permission for her to receive messages.    She was called back, but it went to voicemail. A message was left for her to call back.   
no

## 2021-11-18 NOTE — ED ADULT NURSE NOTE - NS ED NOTE ABUSE RESPONSE YN
[address] [facility]    Occupational Therapy Evaluation  Date: 21  Patient Name: Arya Elder       Room: 4433/5013-75  MRN: [id number]  Account: [de-identified]   : 1934  (81 y.o.) Gender: male     Discharge Recommendations: The patient's needs are being met with no further Occupational Therapy recommended at discharge. Equipment Needed: No    Referring Practitioner: Juvenal Chen MD  Diagnosis: NSTEMI; chronic CHF, Afib, VARSHA  Additional Pertinent Hx: 81year-old male who was admitted to the hospital for the management of NSTEMI. Patient was making his but this morning when he began having a stabbing substernal chest pain. The pain was approximately 6 out of 10, not relieved by rest and aggravated by activity. Patient did not take any medication for his pain. Patient also has had a cramping pain in his bilateral shoulders, arms and legs for the past 2 weeks that have been getting worse. Treatment Diagnosis: Impaired self-care status  Past Medical History:  has a past medical history of Atrial fibrillation ([address] CAD (coronary artery disease), CHF (congestive heart failure) ([address] Hyperlipidemia, and Hypertension. Past Surgical History:   has a past surgical history that includes pacemaker placement and Cardiac catheterization. Restrictions  Restrictions/Precautions: Fall Risk, General Precautions, Up as Tolerated  Implants present? : Metal implants, Pacemaker (Rods/screws lumbar, R AGUSTO, L TKA)  Required Braces or Orthoses?: Yes (R elastic knee brace PRN)     Vitals  Temp: 98.7 °F (37.1 °C)  Pulse: 70  Resp: 20  BP: (!) 137/90  Height: 6' 1\" (185.4 cm)  Weight: 192 lb 7.4 oz (87.3 kg)  BMI (Calculated): 25.4  Oxygen Therapy  SpO2: 98 %  O2 Device: None (Room air)  Level of Consciousness: Alert (0)    Subjective  Subjective: \"See that's why I love this hospital\"  Comments: Okay for OT/PT per Padilla.  Pt pleasant and agreeable  Overall Orientation Status: Within Functional Limits  Vision  Vision: Impaired  Vision Exceptions: Wears glasses for distance, Wears glasses for reading  Hearing  Hearing: Exceptions to [facility]  Hearing Exceptions: Hard of hearing/hearing concerns, Bilateral hearing aid (Hearing aids not present)  Social/Functional History  Lives With: Son (Son, son's wife, grandson)  Type of Home: [facility] [place]: One level (Basement but pt does not go down)  Home Access: Stairs to enter without rails  Entrance Stairs - Number of Steps: 1 step at front, 1 step in garage (pt mainly uses garage entrance)  Bathroom Shower/Tub: Walk-in shower  Bathroom Toilet: Handicap height  Bathroom Equipment: Grab bars in shower, Hand-held shower, Toilet raiser, Grab bars around toilet  Bathroom Accessibility: Not accessible  Home Equipment: Cane, Rolling walker  Receives Help From: Family  ADL Assistance: Independent  Homemaking Assistance: Independent  Homemaking Responsibilities: Yes  Ambulation Assistance: Independent (Uses cane for community distances)  Transfer Assistance: Independent  Active : Yes  Mode of Transportation: SUV  IADL Comments: sleeps in a flat bed, no recent falls  Additional Comments: Pt's family able to assist as needed, son and son's wife work full time. Pain Assessment  Pain Assessment: 0-10  Pain Level: 6  Pain Type: Chronic pain  Pain Location: Shoulder, Neck  Pain Frequency: Continuous  Clinical Progression: Gradually worsening    Objective      Cognition  Overall Cognitive Status: WFL   Sensation  Overall Sensation Status: Impaired (Numbness in L fingertips PRN)   ADL  Feeding: Modified independent   Grooming: Modified independent   UE Bathing: Contact guard assistance  LE Bathing: Minimal assistance  UE Dressing: Contact guard assistance  LE Dressing: Minimal assistance  Toileting: Stand by assistance  Additional Comments: ADL scores based on skilled observation and clinical reasoning, unless otherwise noted.  Assistance due to limited ROM and pain in B shoulders. UE Function           LUE Strength  L Hand General: 4/5  LUE Strength Comment: Shoulder MMT NT d/t pain; elbow flex/ext 4/5     LUE Tone: Normotonic     LUE AROM (degrees)  LUE AROM : Exceptions  LUE General AROM: WFL except  L Shoulder Flexion 0-180: 0-100 (Limited d/t pain)     Left Hand AROM (degrees)  Left Hand AROM: WFL  RUE Strength  R Hand General: 4/5  RUE Strength Comment: Shoulder MMT NT d/t pain; elbow flex/ext 4/5      RUE Tone: Normotonic     RUE AROM (degrees)  RUE AROM : Exceptions  RUE General AROM: WFL except  R Shoulder Flexion 0-180: 0-100 (Limited d/t pain)     Right Hand AROM (degrees)  Right Hand AROM: WFL    Fine Motor Skills  Coordination  Movements Are Fluid And Coordinated: No  Coordination and Movement description: Gross motor impairments, Right UE, Left UE                           Mobility          Balance  Sitting Balance: Independent  Standing Balance: Stand by assistance  Standing Balance  Time: 3-4 minutes  Activity: functional transfers, functional mobility  Comment: with cane for unilateral support              Functional Activity Tolerance  Functional Activity Tolerance:  Tolerates 10 - 20 min exercise with multiple rests     Assessment  Assessment  Performance deficits / Impairments: Decreased functional mobility , Decreased ADL status, Decreased ROM, Decreased strength, Decreased endurance, Decreased high-level IADLs, Decreased coordination  Treatment Diagnosis: Impaired self-care status  Prognosis: Good  Decision Making: Medium Complexity  REQUIRES OT FOLLOW UP: Yes  Discharge Recommendations: Home with assist PRN  Activity Tolerance: Patient Tolerated treatment well  Activity Tolerance: No complaints of fatigue/lightheadedness/dizzines throughout session         Functional Outcome Measures  AM-PAC Daily Activity Inpatient   How much help for putting on and taking off regular lower body clothing?: A Little  How much help for Bathing?: A Little  How much help for Toileting?: A Little  How much help for putting on and taking off regular upper body clothing?: A Little  How much help for taking care of personal grooming?: None  How much help for eating meals?: None  AM-PAC Inpatient Daily Activity Raw Score: 20  AM-PAC Inpatient ADL T-Scale Score : 42.03  ADL Inpatient CMS 0-100% Score: 38.32  ADL Inpatient CMS G-Code Modifier : CJ       Goals  Patient Goals   Patient goals : To return to independence  Short term goals  Time Frame for Short term goals: By discharge  Short term goal 1: Patient will perform BADLs with mod I and good safety  Short term goal 2: Patient will perform transfers/functional mobility mod I  Short term goal 3: Patient will tolerate standing 8+ minutes, with no LOB and least restrictive device, while engaged in self care/therapeutic activity  Short term goal 4: Patient will actively participate in 15+ minutes of therapeutic exercise/functional activity to promote independence with self care and mobility    Plan  Safety Devices  Safety Devices in place: Yes  Type of devices:  All fall risk precautions in place, Call light within reach, Gait belt, Patient at risk for falls, Left in chair, Nurse notified     Plan  Times per week: 4-5  Current Treatment Recommendations: Strengthening, ROM, Balance Training, Functional Mobility Training, Endurance Training, Pain Management, Safety Education & Training, Patient/Caregiver Education & Training, Equipment Evaluation, Education, & procurement, Self-Care / ADL  Plan Comment: Pt may benefit from compensatory strategies to address limited shoulder ROM (ie use of reacher for overhead items, pain management strategies)       Equipment Recommendations  Equipment Needed: No  OT Individual Minutes  Time In: 3246  Time Out: 3143  Minutes: 36    Electronically signed by John Waters OT on 11/18/21 at 1:43 PM EST         11/18/21 1343   OT Individual Minutes   Time In [address] [place] [address]    Time Out 0904   Minutes 36 Yes

## 2022-01-18 NOTE — ED PROVIDER NOTE - NSDECISIONTRANSTIME_ED_A_ED_DT
Not checking her sugars.  Should start.  Will check A1c today.   COLORLESS (*)     Mucus, UA RARE (*)     All other components within normal limits     Radiographs:  Ct Abdomen Pelvis Wo Contrast    Result Date: 3/26/2020  EXAMINATION: CT OF THE ABDOMEN AND PELVIS WITHOUT CONTRAST 3/26/2020 4:16 pm TECHNIQUE: CT of the abdomen and pelvis was performed without the administration of intravenous contrast. Multiplanar reformatted images are provided for review. Dose modulation, iterative reconstruction, and/or weight based adjustment of the mA/kV was utilized to reduce the radiation dose to as low as reasonably achievable. COMPARISON: 11/15/2028 HISTORY: ORDERING SYSTEM PROVIDED HISTORY: Abd pain TECHNOLOGIST PROVIDED HISTORY: Reason for exam:->Abd pain Is the patient pregnant?->No Reason for Exam: Abd pain Acuity: Acute Type of Exam: Initial FINDINGS: Lower Chest: There is an unchanged small pericardial effusion. Heart size is normal.  There is mild bibasilar dependent atelectasis. Organs: The gallbladder is surgically absent. Bile ducts are not dilated. The liver, spleen, pancreas, adrenal glands and kidneys are grossly normal with the limitations of a noncontrast study. GI/Bowel: The appendix is surgically absent. Unopacified bowel loops are unremarkable. No bowel obstruction. Pelvis: Bilateral tubal ligation clips. Uterus is unremarkable. Urinary bladder is grossly normal. Peritoneum/Retroperitoneum: There is no adenopathy, free air or free fluid. Bones/Soft Tissues: No acute bone or soft tissue abnormality. No acute finding in the abdomen or pelvis. The gallbladder and appendix are surgically absent. Xr Lumbar Spine (2-3 Views)    Result Date: 4/1/2020  EXAMINATION: THREE XRAY VIEWS OF THE LUMBAR SPINE; ONE XRAY VIEW OF THE PELVIS AND TWO XRAY VIEWS LEFT HIP 4/1/2020 12:24 pm COMPARISON: CT abdomen/pelvis 03/29/2020, lumbar plain films dated 02/11/2018, left hip plain films dated 02/11/2018.  HISTORY: ORDERING SYSTEM PROVIDED HISTORY: Acute pain of left hip TECHNOLOGIST PROVIDED HISTORY: Reason for exam:->acute exacerbation of chronic pain Acuity: Acute Type of Exam: Initial Additional signs and symptoms: Pain, strain. Left side hip and groin pain. Pain radiates down left leg posterior side. FINDINGS: Three views of the lumbar spine were performed. There is no acute fracture. There has been interval progression of a 5 mm anterolisthesis of L5 on S1, likely secondary to degenerative facet hypertrophy, progressed from the study of 02/11/2018. No additional abnormal listhesis is identified. There is anterior endplate spondylosis at all levels of the lumbar spine, most notable at L4 and L5. There is stable mild disc space loss and endplate sclerosis at Q4-B4 and L4-L5. The remaining disc spaces are preserved. No destructive osseous lesion is seen. The sacrum and SI joints are stable and unremarkable. Cholecystectomy clips are evident. A single frontal view of the pelvis and frog-leg lateral view of the left hip were performed. There is no acute fracture or dislocation. There is stable mild symmetric bilateral hip osteoarthritis, unchanged. The pelvic ring is intact. There is stable mild degenerative change of the SI joints, unchanged. No destructive osseous lesion is seen. There are stable enthesopathic changes evident. Multiple clips overlie the pelvis. Phleboliths are also noted. 1. No acute abnormality of the lumbar spine. 2. Interval progression of a mild degenerative anterolisthesis of L5 on S1, likely secondary to degenerative facet arthrosis. 3. Stable multilevel lumbar degenerative disc disease at the L3-L4 and L4-L5 levels. 4. No acute abnormality of the left hip or osseous pelvis. There is stable mild symmetric bilateral hip and SI joint osteoarthritis.      Ct Abdomen Pelvis W Iv Contrast    Result Date: 3/29/2020  EXAMINATION: CT OF THE ABDOMEN AND PELVIS WITH CONTRAST 3/29/2020 4:58 pm TECHNIQUE: CT of the abdomen and pelvis was performed with the administration of intravenous contrast. Multiplanar reformatted images are provided for review. Dose modulation, iterative reconstruction, and/or weight based adjustment of the mA/kV was utilized to reduce the radiation dose to as low as reasonably achievable. COMPARISON: CT abdomen pelvis March 26, 2020 HISTORY: ORDERING SYSTEM PROVIDED HISTORY: left flank pain radiating into left groin TECHNOLOGIST PROVIDED HISTORY: IV contrast only. Thank you. Reason for exam:->left flank pain radiating into left groin Reason for exam:->IV contrast only. Thank you. Reason for Exam: left flank pain radiating into left groin Acuity: Acute Type of Exam: Initial FINDINGS: Lower Chest: Mild atelectasis at the right lung base and minimal atelectasis at the left lung base. Lung bases otherwise appear clear. Organs: Liver appears unremarkable. The gallbladder is surgically absent. No biliary dilatation. The spleen, pancreas, and bilateral adrenal glands demonstrate no acute abnormality. The kidneys enhance symmetrically with no hydronephrosis identified. No obstructive uropathy evident. GI/Bowel: No bowel obstruction identified. The appendix is surgically absent. No abnormal bowel wall thickening evident. Small bowel is normal in caliber. Pelvis: Bladder and solid pelvic organs demonstrate no acute abnormality. Postoperative changes of tubal ligation. Peritoneum/Retroperitoneum: The abdominal aorta is normal in caliberwith no significant atherosclerotic calcifications. No retroperitoneal or mesenteric lymphadenopathy is identified. No free air or fluid is seen in the abdomen. Bones/Soft Tissues: No acute osseous or soft tissue abnormality. Metallic jewelry noted at the level of the umbilicus. 1. No acute intra-process identified. 2. No obstructive uropathy. 3. No evidence for bowel obstruction. Vl Dup Lower Extremity Arteries Left    1.  Three-vessel runoff is identified, however, the proximal anterior tibial 24-Sep-2019 17:33

## 2022-08-27 NOTE — PATIENT PROFILE ADULT - NSPROPTRIGHTNOTIFY_GEN_A_NUR
declines
You were seen in the emergency department for: dizziness  Your results were normal - please see attached results report.   We recommend you follow up with: your primary care doctor.    Please return to the Emergency Department if you experience any of the following symptoms:   - Shortness of breath or trouble breathing  - Pressure, pain or tightness in the chest  - Face drooping, arm weakness or speech difficulty  - Persistence of severe vomiting  - Head injury or loss of consciousness  - Nonstop bleeding or an open wound    (1) Follow up with your primary care physician within the next 24-48 hours as discussed. In addition, we did not find evidence of a life threatening illness on your testing here today, but listed below are the specialists that will be necessary to see as an outpatient to continue the workup.  Please call the numbers listed below or [phone] to set up the necessary appointments.  (2) Take Tylenol (up to 1000mg or 1 g)  and/or Motrin (up to 600mg) up to every 6 hours as needed for pain.   (3) If you had an IV (intravenous) line placed, it was removed. Sometimes, after IV removal, that area can be tender for a few days; if it develops redness and swelling, those could be signs of infection; in which case, return to the Emergency Department for assessment.  (4) Please continue taking all of your home medications as directed.

## 2023-09-20 NOTE — ED PROVIDER NOTE - ATTENDING CONTRIBUTION TO CARE
Attending MD Umana: I personally have seen and examined this patient.  Resident note reviewed and agree on plan of care and except where noted.  See below for details.     Seen in [place] [facility]    63F with PMH/PSH including MS on implanted Baclofen pump in abdomen for pain, anemia (not Fe def), recent intertrochanteric fracture s/p intramedullary nailing of femur (Dr. Carpenter 9/7/19) presents to the ED with "my staples are infected".  Reports had a fall few days prior to presenting to ED on 9/6/19 with R intertrochanteric fracture for which she had an intramedullary nailing on 9/7/19.  Review of EMR reveals patient was in [facility] from 9/6/19-9/12/19.  Reports then went to rehab, while there reports "my staples got infected".  Reports first noted two days ago redness and "hot".  Reports her daughter told her that her incision was "oozing blood and pus".  Reports can move both LEs, reports L without issue and R has baseline mobility issues secondary to MS but has had pain since surgery.  Denies fevers, chills.  Denies chest pain, shortness of breath, palpitations. Denies abdominal pain, nausea, vomiting, diarrhea, blood in stools. Denies dysuria, hematuria, change in urinary habits including frequency, urgency.  ON exam, NAD,     TO BE COMPLETED No Attending MD Umana: I personally have seen and examined this patient.  Resident note reviewed and agree on plan of care and except where noted.  See below for details.     Seen in [place] [facility]    63F with PMH/PSH including MS on implanted Baclofen pump in abdomen for pain, anemia (not Fe def), recent intertrochanteric fracture s/p intramedullary nailing of femur (Dr. Carpenter 9/7/19) presents to the ED with "my staples are infected".  Reports had a fall few days prior to presenting to ED on 9/6/19 with R intertrochanteric fracture for which she had an intramedullary nailing on 9/7/19.  Review of EMR reveals patient was in [facility] from 9/6/19-9/12/19.  Reports then went to rehab, while there reports "my staples got infected".  Reports first noted two days ago redness and "hot".  Reports her daughter told her that her incision was "oozing blood and pus".  Reports can move both LEs, reports L without issue and R has baseline mobility issues secondary to MS but has had pain since surgery.  Denies fevers, chills.  Denies chest pain, shortness of breath, palpitations. Denies abdominal pain, nausea, vomiting, diarrhea, blood in stools. Denies dysuria, hematuria, change in urinary habits including frequency, urgency.  On exam, NAD, head NCAT, PERRL, FROM at neck, lungs CTAB with good inspiratory effort, +S1S2, no m/r/g, abdomen soft with +BS, NT, ND, no CVAT, +R with purulent discharge and fluctuance at proximal incision, +surrounding blanching erythema at proximal incision, sensory grossly intact, cap refill <2s, +2 DPs, FROM at L hip; 63F with R hip wound infection, will obtain labs, EKG, CXR, ortho consult

## 2023-11-24 NOTE — ED ADULT NURSE NOTE - RESPIRATORY RATE (BREATHS/MIN)
Spoke with Gabriele Cobian @ [facility]--they have insurance authorization for patient to return there. Patient to return to her former room [address]  nurse to call report to  and request 2nd floor nurses station. [facility] arranged for [address]  [facility] form completed).   Message left for brother Leroy Irwin (phone ) updating him of insurance authorization and return to [address] [facility] [address]

## 2024-02-19 NOTE — ED PROVIDER NOTE - OTHER FINDINGS
QTc is 448
How Severe Is Your Cyst?: moderate
Is This A New Presentation, Or A Follow-Up?: Follow Up Cyst

## 2024-02-21 NOTE — PHYSICAL THERAPY INITIAL EVALUATION ADULT - PATIENT/FAMILY/SIGNIFICANT OTHER GOALS STATEMENT, PT EVAL
BACILLUS COAGULANS-INULIN (PROBIOTIC) 1-250 BILLION-MG CAPS    Take 1 capsule by mouth daily    BACLOFEN (LIORESAL) 10 MG TABLET    Take 1 tablet by mouth 2 times daily    BENZONATATE (TESSALON) 100 MG CAPSULE    Take 1 capsule by mouth 2 times daily as needed for Cough    CHOLECALCIFEROL (VITAMIN D) 50 MCG (2000 UT) CAPS CAPSULE    Take by mouth    CLOPIDOGREL (PLAVIX) 75 MG TABLET    Take 1 tablet by mouth daily    CYANOCOBALAMIN (VITAMIN B 12 PO)    Take by mouth    DEXTROMETHORPHAN-GUAIFENESIN (MUCINEX DM)  MG PER EXTENDED RELEASE TABLET    Take 1 tablet by mouth every 12 hours as needed    DOXYCYCLINE HYCLATE (VIBRA-TABS) 100 MG TABLET    Take 1 tablet by mouth 2 times daily for 7 days    FLAXSEED, LINSEED, (FLAXSEED OIL) 1200 MG CAPS    Take 1 capsule by mouth daily    FLUTICASONE (FLONASE) 50 MCG/ACT NASAL SPRAY    1 spray by Each Nostril route daily    HYDROCHLOROTHIAZIDE (HYDRODIURIL) 25 MG TABLET    Take by mouth    LOSARTAN (COZAAR) 50 MG TABLET    Take 2 tablets by mouth daily    OMEGA-3 FATTY ACIDS (SALMON OIL-1000) 200 MG CAPS    Take 1 capsule by mouth daily Hold for now while on plavix    PREDNISONE (DELTASONE) 20 MG TABLET    Take 1 tablet by mouth daily for 5 days    PREGABALIN (LYRICA) 50 MG CAPSULE    Take 1 capsule by mouth 2 times daily for 30 days. Max Daily Amount: 100 mg    VITAMIN C (ASCORBIC ACID) 500 MG TABLET    Take 1 tablet by mouth daily    ZINC ACETATE (GALZIN) 50 MG CAPSULE    Take 1 capsule by mouth       ALLERGIES     Broccoli [brassica oleracea], Famotidine, and Propoxyphene    FAMILY HISTORY     History reviewed. No pertinent family history.       SOCIAL HISTORY       Social History     Socioeconomic History    Marital status:      Spouse name: None    Number of children: None    Years of education: None    Highest education level: None   Tobacco Use    Smoking status: Never    Smokeless tobacco: Never   Vaping Use    Vaping Use: Never used   Substance and Sexual  to get stronger and go home

## 2025-02-01 NOTE — ED ADULT NURSE NOTE - NS ED NURSE RECORD ANOTHER VITAL SIGN
Yes Acute Bronchitis, Adult  A comparison between normal and swollen airways, or bronchi, in the lungs.  Acute bronchitis is sudden inflammation of the main airways (bronchi) that come off the windpipe (trachea) in the lungs. The swelling causes the airways to get smaller and make more mucus than normal. This can make it hard to breathe and can cause coughing or noisy breathing (wheezing).    Acute bronchitis may last several weeks. The cough may last longer. Allergies, asthma, and exposure to smoke may make the condition worse.    What are the causes?  This condition can be caused by germs and by substances that irritate the lungs, including:  Cold and flu viruses. The most common cause of this condition is the virus that causes the common cold.  Bacteria. This is less common.  Breathing in substances that irritate the lungs, including:  Smoke from cigarettes and other forms of tobacco.  Dust and pollen.  Fumes from household cleaning products, gases, or burned fuel.  Indoor or outdoor air pollution.  What increases the risk?  The following factors may make you more likely to develop this condition:  A weak body's defense system, also called the immune system.  A condition that affects your lungs and breathing, such as asthma.  What are the signs or symptoms?  Common symptoms of this condition include:  Coughing. This may bring up clear, yellow, or green mucus from your lungs (sputum).  Wheezing.  Runny or stuffy nose.  Having too much mucus in your lungs (chest congestion).  Shortness of breath.  Aches and pains, including sore throat or chest.  How is this diagnosed?  This condition is usually diagnosed based on:  Your symptoms and medical history.  A physical exam.  You may also have other tests, including tests to rule out other conditions, such as pneumonia. These tests include:  A test of lung function.  Test of a mucus sample to look for the presence of bacteria.  Tests to check the oxygen level in your blood.  Blood tests.  Chest X-ray.  How is this treated?  Most cases of acute bronchitis clear up over time without treatment. Your health care provider may recommend:  Drinking more fluids to help thin your mucus so it is easier to cough up.  Taking inhaled medicine (inhaler) to improve air flow in and out of your lungs.  Using a vaporizer or a humidifier. These are machines that add water to the air to help you breathe better.  Taking a medicine that thins mucus and clears congestion (expectorant).  Taking a medicine that prevents or stops coughing (cough suppressant).  It is not common to take an antibiotic medicine for this condition.    Follow these instructions at home:  A do not smoke cigarettes sign.  Take over-the-counter and prescription medicines only as told by your health care provider.  Use an inhaler, vaporizer, or humidifier as told by your health care provider.  Take two teaspoons (10 mL) of honey at bedtime to lessen coughing at night.  Drink enough fluid to keep your urine pale yellow.  Do not use any products that contain nicotine or tobacco. These products include cigarettes, chewing tobacco, and vaping devices, such as e-cigarettes. If you need help quitting, ask your health care provider.  Get plenty of rest.  Return to your normal activities as told by your health care provider. Ask your health care provider what activities are safe for you.  Keep all follow-up visits. This is important.  How is this prevented?  Washing hands with soap and water.  To lower your risk of getting this condition again:  Wash your hands often with soap and water for at least 20 seconds. If soap and water are not available, use hand .  Avoid contact with people who have cold symptoms.  Try not to touch your mouth, nose, or eyes with your hands.  Avoid breathing in smoke or chemical fumes. Breathing smoke or chemical fumes will make your condition worse.  Get the flu shot every year.  Contact a health care provider if:  Your symptoms do not improve after 2 weeks.  You have trouble coughing up the mucus.  Your cough keeps you awake at night.  You have a fever.  Get help right away if you:  Cough up blood.  Feel pain in your chest.  Have severe shortness of breath.  Faint or keep feeling like you are going to faint.  Have a severe headache.  Have a fever or chills that get worse.  These symptoms may represent a serious problem that is an emergency. Do not wait to see if the symptoms will go away. Get medical help right away. Call your local emergency services (911 in the [place] Do not drive yourself to the hospital.    Summary  Acute bronchitis is inflammation of the main airways (bronchi) that come off the windpipe (trachea) in the lungs. The swelling causes the airways to get smaller and make more mucus than normal.  Drinking more fluids can help thin your mucus so it is easier to cough up.  Take over-the-counter and prescription medicines only as told by your health care provider.  Do not use any products that contain nicotine or tobacco. These products include cigarettes, chewing tobacco, and vaping devices, such as e-cigarettes. If you need help quitting, ask your health care provider.  Contact a health care provider if your symptoms do not improve after 2 weeks.  This information is not intended to replace advice given to you by your health care provider. Make sure you discuss any questions you have with your health care provider.

## 2025-05-22 NOTE — ED ADULT NURSE REASSESSMENT NOTE - EENT W[facility]
Eyes with no visual disturbances.  Ears clean and dry and no hearing difficulties. Nose with pink mucosa and no drainage.  Mouth mucous membranes moist and pink.  No tenderness or swelling to throat or neck. 0 = understands/communicates without difficulty